# Patient Record
Sex: FEMALE | Employment: FULL TIME | URBAN - METROPOLITAN AREA
[De-identification: names, ages, dates, MRNs, and addresses within clinical notes are randomized per-mention and may not be internally consistent; named-entity substitution may affect disease eponyms.]

---

## 2022-10-20 ENCOUNTER — OFFICE VISIT (OUTPATIENT)
Dept: INTERNAL MEDICINE CLINIC | Age: 72
End: 2022-10-20
Payer: MEDICARE

## 2022-10-20 VITALS
DIASTOLIC BLOOD PRESSURE: 75 MMHG | BODY MASS INDEX: 37.39 KG/M2 | RESPIRATION RATE: 16 BRPM | OXYGEN SATURATION: 99 % | SYSTOLIC BLOOD PRESSURE: 121 MMHG | WEIGHT: 203.2 LBS | TEMPERATURE: 97.3 F | HEART RATE: 57 BPM | HEIGHT: 62 IN

## 2022-10-20 DIAGNOSIS — Z23 NEEDS FLU SHOT: ICD-10-CM

## 2022-10-20 DIAGNOSIS — R73.9 BORDERLINE HYPERGLYCEMIA: ICD-10-CM

## 2022-10-20 DIAGNOSIS — Z78.0 POSTMENOPAUSAL: ICD-10-CM

## 2022-10-20 DIAGNOSIS — Z11.59 ENCOUNTER FOR HEPATITIS C SCREENING TEST FOR LOW RISK PATIENT: ICD-10-CM

## 2022-10-20 DIAGNOSIS — R60.0 BILATERAL LOWER EXTREMITY EDEMA: ICD-10-CM

## 2022-10-20 DIAGNOSIS — Z12.31 ENCOUNTER FOR SCREENING MAMMOGRAM FOR MALIGNANT NEOPLASM OF BREAST: ICD-10-CM

## 2022-10-20 DIAGNOSIS — I48.91 ATRIAL FIBRILLATION, UNSPECIFIED TYPE (HCC): Primary | ICD-10-CM

## 2022-10-20 DIAGNOSIS — Z12.11 SCREEN FOR COLON CANCER: ICD-10-CM

## 2022-10-20 DIAGNOSIS — I87.2 STASIS DERMATITIS OF BOTH LEGS: ICD-10-CM

## 2022-10-20 PROCEDURE — 99204 OFFICE O/P NEW MOD 45 MIN: CPT | Performed by: INTERNAL MEDICINE

## 2022-10-20 PROCEDURE — 90694 VACC AIIV4 NO PRSRV 0.5ML IM: CPT | Performed by: INTERNAL MEDICINE

## 2022-10-20 PROCEDURE — G0008 ADMIN INFLUENZA VIRUS VAC: HCPCS | Performed by: INTERNAL MEDICINE

## 2022-10-20 RX ORDER — SOTALOL HYDROCHLORIDE 80 MG/1
80 TABLET ORAL 2 TIMES DAILY
COMMUNITY
Start: 2022-08-12

## 2022-10-20 RX ORDER — RIVAROXABAN 20 MG/1
TABLET, FILM COATED ORAL
COMMUNITY
Start: 2022-08-25

## 2022-10-20 RX ORDER — ATORVASTATIN CALCIUM 40 MG/1
40 TABLET, FILM COATED ORAL DAILY
COMMUNITY

## 2022-10-20 RX ORDER — BUMETANIDE 1 MG/1
1 TABLET ORAL DAILY
COMMUNITY
Start: 2022-08-24

## 2022-10-20 NOTE — PROGRESS NOTES
PROGRESS NOTE  Name: Samy Reed   : 1950       ASSESSMENT/ PLAN:     Diagnoses and all orders for this visit:    Atrial fibrillation, unspecified type ([facility] [address]  -     LIPID PANEL; Future  -     METABOLIC PANEL, COMPREHENSIVE; Future  -     CBC WITH AUTOMATED DIFF; Future  -     CBC WITH AUTOMATED DIFF  -     METABOLIC PANEL, COMPREHENSIVE  -     LIPID PANEL    Needs flu shot  -     INFLUENZA, FLUAD, (AGE 72 Y+), IM, PF, 0.5 ML    Bilateral lower extremity edema  -     REFERRAL TO LYMPHEDEMA CLINIC    Stasis dermatitis of both legs    Encounter for screening mammogram for malignant neoplasm of breast  -     MERLY MAMMO BI SCREENING INCL CAD; Future    Postmenopausal  -     DEXA BONE DENSITY STUDY AXIAL; Future    Screen for colon cancer  -     COLOGUARD TEST (FECAL DNA COLORECTAL CANCER SCREENING)    Borderline hyperglycemia  -     HEMOGLOBIN A1C WITH EAG; Future  -     HEMOGLOBIN A1C WITH EAG    Encounter for hepatitis C screening test for low risk patient  -     HEPATITIS C AB; Future  -     HEPATITIS C AB    Follow-up and Dispositions    Return in about 1 year (around 10/20/2023) for leg edema, etc.       I have reviewed the patient's medications and risks/side effects/benefits were discussed. Diagnosis(-es) explained to patient and questions answered. Literature provided where appropriate. SUBJECTIVE:   Ms. Samy Reed is a 71 y.o. female who is here for follow up of routine medical issues. Chief Complaint   Patient presents with    Establish Care       She has never had a PCP since childhood. She has atrial fibrillation, and is on sotalol and xarelto. She has a history of ablation in the past, Dr. Sebastián Mercado. She developed hives after spray for pesticide. At this time, she is otherwise doing well and has brought no other complaints to my attention today. For a list of the medical issues addressed today, see the assessment and plan below.     PMH:   Past Medical History:   Diagnosis Date    Arrhythmia     ATRIAL FIB    Hypertension        Past Surgical History:   Procedure Laterality Date    HX APPENDECTOMY      HX CATARACT REMOVAL Bilateral     HX GASTRIC BYPASS  01/01/1994    HX LUMBAR FUSION  2016    L4-5    HX OTHER SURGICAL Right 2018    Fell and hit coffee table; bleeding--vascular proceedure R thigh. HX TONSILLECTOMY         All: She has No Known Allergies. Current Outpatient Medications   Medication Sig    bumetanide (BUMEX) 1 mg tablet Take 1 mg by mouth daily. sotaloL (BETAPACE) 80 mg tablet Take 80 mg by mouth two (2) times a day. Xarelto 20 mg tab tablet TAKE 1 TABLET BY MOUTH ONCE DAILY WITH EVENING MEAL. atorvastatin (LIPITOR) 40 mg tablet Take 40 mg by mouth daily. lisinopril-hydrochlorothiazide (PRINZIDE, ZESTORETIC) 10-12.5 mg per tablet Take  by mouth daily. ferrous sulfate 325 mg (65 mg iron) tablet Take  by mouth Daily (before breakfast). thiamine HCL (B-1) 100 mg tablet Take  by mouth daily. No current facility-administered medications for this visit. FH: Her family history includes Heart Failure in her mother. 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 She reports that she has never smoked. She has never used smokeless tobacco. She reports that she does not drink alcohol and does not use drugs. ROS: See above; Complete ROS otherwise negative. OBJECTIVE:   Vitals: Visit Vitals  /75 (BP 1 Location: Left upper arm, BP Patient Position: Sitting, BP Cuff Size: Adult)   Pulse (!) 57   Temp 97.3 °F (36.3 °C) (Temporal)   Resp 16   Ht 5' 2\" (1.575 m)   Wt 203 lb 3.2 oz (92.2 kg)   SpO2 99%   BMI 37.17 kg/m²      Gen: Pleasant 71 y.o.  female in NAD. HEENT: PERRLA. EOMI. OP moist and pink. Neck: Supple. No LAD. HEART: RRR, No M/G/R.    LUNGS: CTAB No W/R. ABDOMEN: S, NT, ND, BS+. EXTREMITIES: Warm. 2-3+ edema with stasis dermatitis and weeping. MUSCULOSKELETAL: Normal ROM, muscle strength 5/5 all groups.   NEURO: Alert and oriented x 3. Cranial nerves grossly intact. No focal sensory or motor deficits noted. SKIN: Warm. Dry. Lab Results   Component Value Date/Time    Sodium 141 11/17/2011 04:00 AM    Potassium 3.2 (L) 11/17/2011 04:00 AM    Chloride 108 11/17/2011 04:00 AM    CO2 26 11/17/2011 04:00 AM    Anion gap 7 11/17/2011 04:00 AM    Glucose 100 11/17/2011 04:00 AM    BUN 5 (L) 11/17/2011 04:00 AM    Creatinine 0.4 (L) 11/17/2011 04:00 AM    BUN/Creatinine ratio 13 11/17/2011 04:00 AM    GFR est AA >60 11/17/2011 04:00 AM    GFR est non-AA >60 11/17/2011 04:00 AM    Calcium 8.3 (L) 11/17/2011 04:00 AM    Bilirubin, total 0.8 11/09/2011 02:16 PM    ALT (SGPT) 22 11/09/2011 02:16 PM    Alk.  phosphatase 73 11/09/2011 02:16 PM    Protein, total 6.5 11/09/2011 02:16 PM    Albumin 3.7 11/09/2011 02:16 PM    Globulin 2.8 11/09/2011 02:16 PM    A-G Ratio 1.3 11/09/2011 02:16 PM     Lab Results   Component Value Date/Time    WBC 8.6 11/09/2011 02:16 PM    HGB 10.3 (L) 11/17/2011 04:00 AM    HCT 30.9 (L) 11/17/2011 04:00 AM    PLATELET 893 40/47/4904 02:16 PM    MCV 91.0 11/09/2011 02:16 PM

## 2022-10-20 NOTE — PROGRESS NOTES
1. \"Have you been to the ER, urgent care clinic since your last visit? Hospitalized since your last visit? \" No    2. \"Have you seen or consulted any other health care providers outside of the [address] [place] [address] since your last visit? \" No     3. For patients aged 39-70: Has the patient had a colonoscopy / FIT/ Cologuard? No      If the patient is female:    4. For patients aged 41-77: Has the patient had a mammogram within the past 2 years? No      5. For patients aged 21-65: Has the patient had a pap smear?  No

## 2022-10-21 LAB
ALBUMIN SERPL-MCNC: 3.9 G/DL (ref 3.5–5)
ALBUMIN/GLOB SERPL: 1.3 {RATIO} (ref 1.1–2.2)
ALP SERPL-CCNC: 129 U/L (ref 45–117)
ALT SERPL-CCNC: 20 U/L (ref 12–78)
ANION GAP SERPL CALC-SCNC: 6 MMOL/L (ref 5–15)
AST SERPL-CCNC: 16 U/L (ref 15–37)
BASOPHILS # BLD: 0.1 K/UL (ref 0–0.1)
BASOPHILS NFR BLD: 1 % (ref 0–1)
BILIRUB SERPL-MCNC: 0.8 MG/DL (ref 0.2–1)
BUN SERPL-MCNC: 33 MG/DL (ref 6–20)
BUN/CREAT SERPL: 32 (ref 12–20)
CALCIUM SERPL-MCNC: 9.2 MG/DL (ref 8.5–10.1)
CHLORIDE SERPL-SCNC: 104 MMOL/L (ref 97–108)
CHOLEST SERPL-MCNC: 154 MG/DL
CO2 SERPL-SCNC: 29 MMOL/L (ref 21–32)
CREAT SERPL-MCNC: 1.03 MG/DL (ref 0.55–1.02)
DIFFERENTIAL METHOD BLD: ABNORMAL
EOSINOPHIL # BLD: 1.3 K/UL (ref 0–0.4)
EOSINOPHIL NFR BLD: 13 % (ref 0–7)
ERYTHROCYTE [DISTWIDTH] IN BLOOD BY AUTOMATED COUNT: 14.1 % (ref 11.5–14.5)
EST. AVERAGE GLUCOSE BLD GHB EST-MCNC: 105 MG/DL
GLOBULIN SER CALC-MCNC: 3.1 G/DL (ref 2–4)
GLUCOSE SERPL-MCNC: 99 MG/DL (ref 65–100)
HBA1C MFR BLD: 5.3 % (ref 4–5.6)
HCT VFR BLD AUTO: 39.2 % (ref 35–47)
HCV AB SERPL QL IA: NONREACTIVE
HDLC SERPL-MCNC: 58 MG/DL
HDLC SERPL: 2.7 {RATIO} (ref 0–5)
HGB BLD-MCNC: 12.4 G/DL (ref 11.5–16)
IMM GRANULOCYTES # BLD AUTO: 0 K/UL (ref 0–0.04)
IMM GRANULOCYTES NFR BLD AUTO: 0 % (ref 0–0.5)
LDLC SERPL CALC-MCNC: 78 MG/DL (ref 0–100)
LYMPHOCYTES # BLD: 1.5 K/UL (ref 0.8–3.5)
LYMPHOCYTES NFR BLD: 15 % (ref 12–49)
MCH RBC QN AUTO: 31.8 PG (ref 26–34)
MCHC RBC AUTO-ENTMCNC: 31.6 G/DL (ref 30–36.5)
MCV RBC AUTO: 100.5 FL (ref 80–99)
MONOCYTES # BLD: 0.7 K/UL (ref 0–1)
MONOCYTES NFR BLD: 7 % (ref 5–13)
NEUTS SEG # BLD: 6.1 K/UL (ref 1.8–8)
NEUTS SEG NFR BLD: 64 % (ref 32–75)
NRBC # BLD: 0 K/UL (ref 0–0.01)
NRBC BLD-RTO: 0 PER 100 WBC
PLATELET # BLD AUTO: 259 K/UL (ref 150–400)
PMV BLD AUTO: 11.9 FL (ref 8.9–12.9)
POTASSIUM SERPL-SCNC: 4.4 MMOL/L (ref 3.5–5.1)
PROT SERPL-MCNC: 7 G/DL (ref 6.4–8.2)
RBC # BLD AUTO: 3.9 M/UL (ref 3.8–5.2)
RBC MORPH BLD: ABNORMAL
SODIUM SERPL-SCNC: 139 MMOL/L (ref 136–145)
TRIGL SERPL-MCNC: 90 MG/DL (ref ?–150)
VLDLC SERPL CALC-MCNC: 18 MG/DL
WBC # BLD AUTO: 9.7 K/UL (ref 3.6–11)

## 2022-12-21 ENCOUNTER — HOSPITAL ENCOUNTER (OUTPATIENT)
Dept: PHYSICAL THERAPY | Age: 72
Discharge: HOME | End: 2022-12-21
Payer: MEDICARE

## 2022-12-21 VITALS — BODY MASS INDEX: 38.05 KG/M2 | WEIGHT: 206.8 LBS | HEIGHT: 62 IN

## 2022-12-21 PROCEDURE — 97110 THERAPEUTIC EXERCISES: CPT

## 2022-12-21 PROCEDURE — 97140 MANUAL THERAPY 1/> REGIONS: CPT

## 2022-12-21 PROCEDURE — 97161 PT EVAL LOW COMPLEX 20 MIN: CPT

## 2022-12-21 NOTE — THERAPY EVALUATION
PT/OT LYMPHEDEMA INITIAL EVALUATION NOTE - [facility] 2-15    Patient Name: Davie Smith  Date:2022  : 1950  [x]  Patient  Verified  Payor: Maribeth Harvey / Plan: [facility] MEDICARE COMPLETE / Product Type: Managed Care Medicare /    In time: 8:00 am  Out time: 9:10 am  Total Treatment Time (min): 70  Total Timed Codes (min): 45  1:1 Treatment Time ( only): 45   Visit #: 1     Treatment Area: Lymphedema, not elsewhere classified [I89.0] B LE     SUBJECTIVE  Pain Level (0-10 scale): Patient reports discomfort in the knees from arthritis. Any medication changes, allergies to medications, adverse drug reactions, diagnosis change, or new procedure performed?: [] No    [x] Yes (see summary sheet for update)  Subjective:    Patient reports that swelling in the legs began several years ago. She attempted to wear knee high stockings but they never fit well. Function: Patient is independent with self care and household chores; uses a narrow based quad cane in the community and no assistive device in the house. She has difficulty with stairs. Mechanism of Injury: Patient reports that swelling in the legs began 4 years ago soon after falling into the coffee table and suffering an injury to the R thigh. She required surgery which left a large scar. The swelling is worse in the R leg and seems to be progressing over time. No history of infection. Previous Treatment/Compliance: None  PMHx/Surgical Hx:   Past Surgical History:   Procedure Laterality Date    HX APPENDECTOMY      HX CATARACT REMOVAL Bilateral     HX GASTRIC BYPASS  1994    HX LUMBAR FUSION  2016    L4-5    HX OTHER SURGICAL Right 2018    Fell and hit coffee table; bleeding--vascular proceedure R thigh. HX TONSILLECTOMY       Past Medical History:   Diagnosis Date    Arrhythmia     ATRIAL FIB    Hypertension      Work Hx: Retired nurse. Living Situation: Lives alone in an apartment with 5 steps and rail to enter.    Pt Goals: reduce swelling in the legs  Barriers: no insurance coverage of compression products  Motivation: good/fair  Cognition: A & O x 3      Sleeping Arrangement:  in bed at night  Compression/Lymphedema Equipment: None    LLIS Score: 21 with 31% impairment    OBJECTIVE/EXAMINATION    Skin and Tissue Assessment:  Dermal Status: Intact R LE; L LE intact except for superficial wound and two to three fluid filled blisters on the L lower leg anteriorly. Texture/Consistency: Brawny and Fibrotic lower legs; boggy upper legs and feet/ankles    Pigmentation/Color Change: Hyperpigmented lower legs    Anomalies: Lymphorrhea (lymph drainage)/blisters L lower leg anteriorly and papillomas on the legs bilaterally. Circulatory:  No history of DVT    Nails: Fungus and in need of trimming. Stemmers Sign: Positive    Height:  Height: 5' 2\" (157.5 cm)  Weight:  Weight: 93.8 kg (206 lb 12.8 oz)   BMI:  BMI (calculated): 37.8  (36 or greater: adversely affecting lymphedema)  Volumetric Measurements:   Right:  15,461. 32 mL Left:  13,270.66 mL   % Difference: 16.51% R LE > L LE  Dominance: Right hand dominant     15 min Therapeutic Exercise:  [] Rose Robledo Exercises [x] Remedial Lymphedema Exercise Program - demonstrated the LE active ROM routine and deep abdominal breathing [x] Discussed participating in a daily walking program with cane up to 25 minutes per day as tolerated    [x] Encouraged patient to avoid sitting for prolonged periods at one time. Rationale: Activate muscle pump to improve lymphatic fluid movement and decrease swelling to improve the patients ability to perform ADL and IADL skills and prevent worsening of swelling over time. 35 min Manual Therapy: Patient instructed in skin care principles and anatomy of the lymphatic system. Discussed performing daily skin care with a low pH lotion using manual lymphatic drainage principles. Provided patient with a written list of recommended soaps and lotions. Performed the following skin care today: washed the superficial wound on the L lower leg and applied A and D ointment and covered with 2 X 2 and wrapped with kerlix. Discussed keeping wounds covered, the increased risk of infection with lymphedema, and signs and symptoms of infection. Discussed standard lymphedema precautions to include avoiding blood pressure readings, injections and IVs or other procedures/acts that could lead to broken skin on affected area, and avoiding excessive heat, resistive activity or altitude without compression garment. Discussed the role and benefit of multi-layer compression bandaging for management of swelling but patient is reluctant to pursue bandaging at this time due to the time commitment. Provided patient with samples of compression products, including flat-knit, velcro, and foam, products and discussed that patient's insurance may not cover the cost of products. Patient may be interested in pursing compression products in a future visit. Discussed the role and benefit of the vaso-pneumatic device as another tool to have at home for optimum management of swelling but patient is not interested in pursuing a pump at this time. Rationale: increase tissue extensibility, decrease edema , and improve skin integrity  to improve the patients ability to reduce the risk of infection and progression of swelling over time. With   [x] TE   [] TA   [x] MT   [] SC   [] other: Patient Education: [x] Review HEP    [x] MLD Patient Education Continued education in self MLD technique with bathing and skin care. [] Progressed/Changed HEP based on:   [x] positioning   [] Kinesiotape   [x] Skin care   [] wound care   [] other:   [x]  Role and benefit of MLB, compression product options  Patient / caregiver re-demonstrated bandaging. [] Yes  [] No  Compression bandaging/garment precautions reviewed: [x] Yes  [] No          Pain Level (0-10 scale) post treatment: No pain reported. ASSESSMENT/Changes in Function:     [x]  See Plan of [address] Pacheco, PT, CLT 12/21/2022

## 2022-12-21 NOTE — PROGRESS NOTES
Outpatient Lymphedema Clinic  a part of [facility]  [address] [place], [address] [facility], [place], [address] [place] [address]  Phone: [phone]  Fax: [phone]    Plan of Care/Statement of Necessity for Physical Therapy/Occupational Therapy Services  2-15    Patient name: Rashid Watts  : 1950  Provider#: [id number]  Referral source: Danita Dudley MD      Medical/Treatment Diagnosis: Lymphedema, not elsewhere classified [I89.0]     Prior Hospitalization: see medical history     Comorbidities: HTN, MI, atrial fib with history of ablation, B LE lymphedema  Prior Level of Function: Patient is independent with self care and household chores; uses a narrow based quad cane in the community and no assistive device in the house. She has difficulty with stairs. Medications: Verified on Patient Summary List  Start of Care: 2022      Onset Date:    The Plan of Care and following information is based on the information from the initial evaluation. Assessment/ medina information: Rashid Watts is a 71 y.o. female who presents with secondary B LE lymphedema complicated by skin integrity issues, history of afib/MI, and obesity. Full LE volumes taken today reveal a percentage difference of 16.52% R LE > L LE. Patient has attempted to wear compression stockings in the past but they never fit well or were comfortable. Patient is at increased risk of progression of swelling, infection, and wounds over time. Patient lives alone and prefers to remain independent. She does have a history of falls and uses a quad cane for support. Patient will benefit from complete decongestive therapy (CDT) including manual lymphatic drainage (MLD) technique, short-stretch textile bandages/compression system to decongest limb, and kinesiotaping as appropriate.   Patient will receive instruction in proper skin care to recognize signs/symptoms of and prevent infection, therapeutic exercise, and self-MLD for independent home program and restorative lymphatic performance. This care is medically necessary due to the infection risk with lymphedema and to improve functional activities. CDT is necessary to resolve swelling to allow patient to return to wearing normal clothes/footwear and prevent worsening of symptoms, such as venous stasis ulcerations, infections, or hospitalizations. Patient will be independent with home program strategies to allow improved ADL ability and mobility and to allow patient to return to greatest functional independence. Evaluation Complexity History MEDIUM  Complexity : 1-2 comorbidities / personal factors will impact the outcome/ POC ; Examination LOW Complexity : 1-2 Standardized tests and measures addressing body structure, function, activity limitation and / or participation in recreation  ;Presentation MEDIUM Complexity : Evolving with changing characteristics  ; Clinical Decision Making Other outcome measures LLIS  MEDIUM  Overall Complexity Rating: LOW     Problem List: edema and skin integrity issues. Treatment Plan may include any combination of the following: skin care, therapeutic exercise, manual lymphatic drainage, multi-layer compression bandaging, measuring and fitting of compression products, and patient education as appropriate. Patient / Family readiness to learn indicated by: asking questions, trying to perform skills, and interest  Persons(s) to be included in education: patient   Barriers/Limitations: no insurance coverage of compression products  Patient Goal (s): reduce swelling in the legs  Rehabilitation Potential: good/fair    Short Term Goals: To be met by 2/1/2023  1. Patient/Caregiver will demonstrate knowledge of signs/symptoms of infections/cellulitis and be independent in skin care to prevent cellulitis.   2.  Patient/Caregiver will demonstrate independence in lymphedema home program of therapeutic exercises to improve circulation and decongest limb to improve ADLs.  3.  Patient/Caregiver will tolerate multi-layer bandages (MLB) and show measureable decrease in limb volume and/or participate in the selection process to allow ordering of home compression system (daytime, nighttime garments and pump as needed). Long Term Goals: To be met by 3/17/2023  1. Patient/Caregiver will show improvement in the Lymphedema Life Impact Scale by decreasing the score to 18 and thus allow improvement in patient's quality of life. 2.  Patient/Caregiver will be independent with don/doff of compression system and use in order to prevent reaccumulation of fluid at discharge. 3. Patient/Caregiver will be independent in home lymphedema management and demonstrate stable or reduced limb volumes to indicate appropriateness for restorative phase of care. Frequency / Duration: Patient to be seen 3 to 7 visits over the next 12 weeks. Patient/ Caregiver education and instruction: exercises, compression product options, skin/wound care. [x]  Plan of care has been reviewed with PTA    Certification Period: 12/21/2022-3/17/2023    Jaqueline Castro PT, CLT 12/21/2022   _______________________________________________________________________    I certify that the above Therapy Services are being furnished while the patient is under my care. I agree with the treatment plan and certify that this therapy is necessary.     [facility] Signature:____________________  Date:____________Time: _________         Cyril Tinsley MD

## 2022-12-29 ENCOUNTER — TELEPHONE (OUTPATIENT)
Dept: INTERNAL MEDICINE CLINIC | Age: 72
End: 2022-12-29

## 2022-12-29 DIAGNOSIS — R19.5 POSITIVE COLORECTAL CANCER SCREENING USING COLOGUARD TEST: Primary | ICD-10-CM

## 2022-12-29 NOTE — TELEPHONE ENCOUNTER
Referral printed off and mailed to the patient.    [facility] GI referral.   Signed By: Chaim Solis LPN     December 29, 2022

## 2022-12-29 NOTE — TELEPHONE ENCOUNTER
Two pt identifiers confirmed. I spoke to the patient's daughter Chloe Ramirez, I let her know about the positive Cologuard results. A referral was ordered to see  . Pt's daughter verbalized understanding of information discussed w/ no further questions at this time.   Signed By: Cecilia Hare LPN     December 29, 2022

## 2023-01-03 ENCOUNTER — HOSPITAL ENCOUNTER (OUTPATIENT)
Dept: PHYSICAL THERAPY | Age: 73
Discharge: HOME | End: 2023-01-03
Payer: MEDICARE

## 2023-01-03 PROCEDURE — 97140 MANUAL THERAPY 1/> REGIONS: CPT

## 2023-01-03 NOTE — PROGRESS NOTES
LYMPHEDEMA PT DAILY TREATMENT NOTE - [facility] 2-15    Patient Name: Mahnaz Moreau  Date:1/3/2023  : 1950  [x]  Patient  Verified  Payor: Jaiden Ayala / Plan: [facility] MEDICARE COMPLETE / Product Type: Managed Care Medicare /    In time: 9:10 am  Out time: 10:30 am  Total Treatment Time (min): 80  Total Timed Codes (min): 80  1:1 Treatment Time ( [address] only): 80   Visit #:  2    Treatment Area: Lymphedema, not elsewhere classified [I89.0]    SUBJECTIVE  Pain Level (0-10 scale): No pain reported  Any medication changes, allergies to medications, adverse drug reactions, diagnosis change, or new procedure performed?: [] No    [x] Yes (see summary sheet for update) Patient reports having cortisone shots in her knees at [facility] yesterday. Subjective functional status/changes:   [x] No changes reported  Patient is making an effort to elevate her legs at home more often. She has ordered a recliner which should be delivered soon. OBJECTIVE    5 min Therapeutic Exercise:  [] Crissie Romberg Exercises [x] Remedial Lymphedema Exercise Program - ankle pumps, ankle circles, heel slides, toe crunches [] Axillary Web Exercise Program      [] Stick Routine    Rationale: Activate muscle pump to improve lymphatic fluid movement and decrease swelling to improve the patients ability to perform ADL and IADL skills and prevent worsening of swelling over time. 75 min Manual Therapy    Kinesiotaping: deferred     Manual Lymphatic Drainage (MLD):  Area to decongest: B LE's   Sequence used and effectiveness: Secondary sequence for lower extremities without trunk involvement. Modifications were made to manual lymph drainage sequence to exclude cervical techniques secondary to patient's age. Patient educated in self MLD packet during MLD portion of the visit. Skin/wound care/debridement: Reviewed skin care principles:   Patient's extremity bathed with soap and water.    Performed skin care with low pH lotion following manual lymph principles. Prevention of cellulitis  Wound care  Patient with two small superficial wounds (approximately 1 cm in diameter) on the L lower leg anteriorly. She is performing skin care at home and keeping covered with bandage. Applied multi-layer compression bandaging to: Patient prefers to defer bandaging at this time. Upper/Lower Extremity Compression: Measured for the following compression products: B LE       Style: Velcro    Brand: 1. Two Solaris Ready Wrap calf units in size XX large regular length and  2. One pair of Circaid compressive under socks 15-25 mmHg size large    Type: Non-Custom: Size: XX large in regular length    Vendor: [employer]    Education: Daily wear schedule. Daily laundering. Garment lifespan. Return and reordering process (by bringing prior garments into the clinic). Educated patient to monitor for redness or pressure points on the skin. If new pain occurs they should contact their therapist.  Eunice Miranda and liberty. Rationale: increase tissue extensibility and decrease edema  to improve the patients ability to reduce the risk of infection, wounds, and progression of swelling over time. 0   Vasopneumatic Device:   Discussed the role and benefit of a home vaso-pneumatic device for optimum management of swelling but patient is not interested in obtaining a pump at this time. May be interested in exploring this option in the future. Rationale: To improve lymphatic fluid movement and decrease swelling to improve the patients ability to perform ADL and IADL skills and prevent worsening of swelling over time. With   [] TE   [] TA   [x] MT   [] SC   [] other: Patient Education: [x] Review HEP    [] MLD Patient Education Reviewed with patient, as well as demonstration and instruction during MLD portion of the session. Continued education in self MLD technique with bathing and skin care.    Provided patient with the written instructions to follow. [x] Progressed/Changed HEP based on:   [x] positioning   [] Kinesiotape   [x] Skin care   [x] wound care   [x] other: compression product options  []   Patient / caregiver re-demonstrated bandaging. [] Yes  [] No  Compression bandaging/garment precautions reviewed: [x] Yes  [] No     Other Objective/Functional Measures:    Height:   deferred  Weight:   deferred   BMI:   deferred  (36 or greater: adversely affecting lymphedema)    Pain Level (0-10 scale) post treatment: No pain reported. ASSESSMENT/Changes in Function:   Patient is returning today for the first visit since evaluation. She presents with secondary B LE lymphedema with skin integrity issues. She has been educated in skin care and is keeping the superficial wounds covered. She is making an effort to perform exercises and elevate her legs as instructed on evaluation. She was educated in the self MLD packet and was measured for compression products today. She will return to the clinic in two to three weeks for fitting of products. Patient is making progress toward all short term goals and will continue to benefit from skilled PT services to  address swelling, analyze and address soft tissue restrictions, analyze compression product fit and use, instruct in home lymphedema management program, and modify and progress therapeutic interventions to attain remaining goals. []  See Plan of Care  []  See progress note/recertification  []  See Discharge Summary         Progress towards goals / Updated goals:  Short Term Goals: To be met by 2/1/2023  1. Patient/Caregiver will demonstrate knowledge of signs/symptoms of infections/cellulitis and be independent in skin care to prevent cellulitis. Goal in progress. 2.  Patient/Caregiver will demonstrate independence in lymphedema home program of therapeutic exercises to improve circulation and decongest limb to improve ADLs. Goal in progress.    3.  Patient/Caregiver will tolerate multi-layer bandages (MLB) and show measureable decrease in limb volume and/or participate in the selection process to allow ordering of home compression system (daytime, nighttime garments and pump as needed). Compression products ordered today. Will continue to assess for additional needs. Goal in progress. Long Term Goals: To be met by 3/17/2023  1. Patient/Caregiver will show improvement in the Lymphedema Life Impact Scale by decreasing the score to 18 and thus allow improvement in patient's quality of life. 2.  Patient/Caregiver will be independent with don/doff of compression system and use in order to prevent reaccumulation of fluid at discharge. 3. Patient/Caregiver will be independent in home lymphedema management and demonstrate stable or reduced limb volumes to indicate appropriateness for restorative phase of care.        PLAN  []  Upgrade activities as tolerated     [x]  Continue plan of care  []  Update interventions per flow sheet       []  Discharge due to:_  []  Other:_      Kriss Edgar, PT, CLT 1/3/2023

## 2023-01-20 ENCOUNTER — APPOINTMENT (OUTPATIENT)
Dept: PHYSICAL THERAPY | Age: 73
End: 2023-01-20
Payer: MEDICARE

## 2023-01-25 ENCOUNTER — APPOINTMENT (OUTPATIENT)
Dept: PHYSICAL THERAPY | Age: 73
End: 2023-01-25
Payer: MEDICARE

## 2023-01-27 ENCOUNTER — HOSPITAL ENCOUNTER (OUTPATIENT)
Dept: PHYSICAL THERAPY | Age: 73
End: 2023-01-27
Payer: MEDICARE

## 2023-01-27 NOTE — PROGRESS NOTES
[facility]  Lymphedema Clinic  [address] [facility] [address], [address], [address] [place] [address] Drive THERAPY      1/27/2023:  Rad Barrett was not seen on this date for physical therapy for the following reason(s):    Patient called to cancel the visit for the following reasons: Illness; Patient called to cancel the visit she had scheduled on 1/25/22 due to illness. Her daughter contacted the clinic yesterday and reported that patient was not ill but anxious regarding attending visits in our clinic. She requested patient be seen as soon as possible since she received the compression products that were ordered. Patient was scheduled for an appointment today which she again cancelled due to illness. Contacted the patient this morning and discussed the 30 day exchange policy of the compression products that were received and that it would be beneficial for her to attend an appointment for the fit of the products to be assessed and for education in use of the products. Patient prefers to work with the products herself and call the clinic if she wishes to return for treatment.      Gina Jensen, PT, CLT

## 2023-02-17 ENCOUNTER — HOSPITAL ENCOUNTER (OUTPATIENT)
Dept: PHYSICAL THERAPY | Age: 73
Discharge: HOME | End: 2023-02-17
Payer: MEDICARE

## 2023-02-17 VITALS — HEIGHT: 62 IN | BODY MASS INDEX: 37.73 KG/M2 | WEIGHT: 205 LBS

## 2023-02-17 PROCEDURE — 97140 MANUAL THERAPY 1/> REGIONS: CPT

## 2023-02-17 NOTE — PROGRESS NOTES
LYMPHEDEMA PT DAILY TREATMENT NOTE - [facility] 2-15    Patient Name: Umang Mittal  Date:2023  : 1950  [x]  Patient  Verified  Payor: Rafy Bowles / [facility] / Product Type: Managed Care Medicare /    In time: 12:30 pm  Out time: 1:45 pm  Total Treatment Time (min): 75  Total Timed Codes (min): 75  1:1 Treatment Time ([facility] only): 75  Visit #:  3    Treatment Area: Lymphedema, not elsewhere classified [I89.0] B LE     SUBJECTIVE  Pain Level (0-10 scale): Patient reports no pain at rest and B knee pain 2 to 3/10 with activity. Any medication changes, allergies to medications, adverse drug reactions, diagnosis change, or new procedure performed?: [] No    [x] Yes (see summary sheet for update) Patient reports being seen in the Emergency Department at [facility] for an episode of Atrial Fib soon after the last visit in our clinic. Patient reports having a Cardioversion and then a follow up with Dr. Ana George, her Cardiologist. She is scheduled to have CT of the heart in March. Dr. Ana George was contacted by our clinic and approval for patient to wear compression products was obtained 23. Patient reports taking an additional medication:Bumex. Subjective functional status/changes:   [x] No changes reported  Patient arrives to the visit accompanied by her daughter. Patient reports receiving the compression products ordered last visit and making an effort to wear the products most days. Patient's daughter reports that she has never seen the velcro products on her mother's legs. OBJECTIVE    0 min Therapeutic Exercise:  [] Hurtis Cordial Exercises [x] Remedial Lymphedema Exercise Program - provided patient with the written instructions to follow; will continue education in a future visit. [] Axillary Web Exercise Program      [] Stick Routine    Rationale:  Activate muscle pump to improve lymphatic fluid movement and decrease swelling to improve the patients ability to perform ADL and IADL skills and prevent worsening of swelling over time. 75 min Manual Therapy    Kinesiotaping: deferred     Manual Lymphatic Drainage (MLD):  Area to decongest: B LE's   Sequence used and effectiveness: Deferred. Patient was educated in self MLD packet during a previous visit. Skin/wound care/debridement: Reviewed skin care principles:   Patient's extremity bathed with soap and water. Performed skin care with low pH lotion following manual lymph principles. Prevention of cellulitis  Wound care  Patient with one small wound on the L anterior lower leg. She is keeping the wound covered and is aware of signs and symptoms of infection. Applied multi-layer compression bandaging to: Discussed the role and benefit of MLB with patient and daughter again today and that it remains an option in the future. Patient is not interested in participating in [facility] at this time. Upper/Lower Extremity Compression: Patient brought compression products to the clinic for fitting today. Patient reports making an effort to wear the velcro products/liner socks most days although the products do appear to have been worn. The initial fit of the garments is good and comfortable to the patient. Patient and daughter were educated in product use and patient encouraged to slowly integrate products into her daily routine: wear velcro product and liner socks for a few hours each day and then daily as tolerated or on the R LE and then the L LE, alternating days, until comfortably worn together. Patient able to demonstrate the ability to don/doff products with verbal cues today. B LE       Style: Velcro    Brand: 1. Two Solaris Ready Wrap calf units in size XX large regular length and  2. One pair of Circaid compressive under socks 25-35 mmHg size large - patient provided with information to purchase additional liner socks when ready.     Type: Non-Custom: Size: XX large in regular length    Vendor: [employer]    Education: Daily wear schedule. Daily laundering. Garment lifespan. Return and reordering process (by bringing prior garments into the clinic). Educated patient to monitor for redness or pressure points on the skin. If new pain occurs they should contact their therapist.  Tapan Harding and liberty. Remove with any shortness of breath or heart palpitations   Rationale: increase tissue extensibility and decrease edema  to improve the patients ability to reduce the risk of infection, wounds, and progression of swelling over time. 0   Vasopneumatic Device:   Patient is not interested in obtaining a pump at this time and would need to be cleared by Cardiology prior to obtaining a pump in the future. Rationale: To improve lymphatic fluid movement and decrease swelling to improve the patients ability to perform ADL and IADL skills and prevent worsening of swelling over time. With   [] TE   [] TA   [x] MT   [] SC   [] other: Patient Education: [x] Review HEP    [x] MLD Patient Education with skin care  [x] Progressed/Changed HEP based on:   [x] positioning   [] Kinesiotape   [x] Skin care   [x] wound care   [x] other: management of compression products, precautions, wear schedule  []   Patient / caregiver re-demonstrated bandaging. [] Yes  [] No  Compression bandaging/garment precautions reviewed: [x] Yes  [] No     Other Objective/Functional Measures:    Height:  Height: 5' 2\" (157.5 cm)  Weight:  Weight: 93 kg (205 lb)   BMI:  BMI (calculated): 37.5  (36 or greater: adversely affecting lymphedema)    Full LE volumes taken today:  R lower extremity 14,289.69 ml today compared to 15,461.32 ml on evaluation 12/21/23. L lower extremity 12,386.32 ml today compared to 13,270.66 ml on evaluation 12/21/23. Percentage difference 15.37% today compared to 16.51% R LE > L LE on evaluation    Pain Level (0-10 scale) post treatment: No pain reported.      ASSESSMENT/Changes in Function: Patient is returning to the clinic today for the third visit. She has not been seen since 1/3/23 due to three cancelled visits. She was treated in the Emergency Department for Atrial Fib and had a follow up with Cardiology since last seen in our clinic. Patient has been cleared to wear compression products which will be slowly integrated into her daily routine. She was fitted with the velcro products and liner socks today and educated in management of the products. Patient's daughter was present for education and will encourage patient to wear compression products, elevate legs throughout the day, and perform the remedial lymphedema exercises. Patient admits to being sedentary and sitting with legs in a dependent position. Patient will work on her home program to the best of her ability and return in mid March after cardiac testing is completed. She will continue to benefit from skilled PT services to  address swelling, analyze and address soft tissue restrictions, analyze compression product fit and use, instruct in home lymphedema management program, and modify and progress therapeutic interventions to attain remaining goals. []  See Plan of Care  [x]  See progress note/recertification  []  See Discharge Summary         Progress towards goals / Updated goals:  Short Term Goals: To be met by 2/1/2023. Will extend short term goal 1 and 2 to be met by 3/17/23. 1.  Patient/Caregiver will demonstrate knowledge of signs/symptoms of infections/cellulitis and be independent in skin care to prevent cellulitis. Goal in progress. 2.  Patient/Caregiver will demonstrate independence in lymphedema home program of therapeutic exercises to improve circulation and decongest limb to improve ADLs. Goal in progress.    3.  Patient/Caregiver will tolerate multi-layer bandages (MLB) and show measureable decrease in limb volume and/or participate in the selection process to allow ordering of home compression system (daytime, nighttime garments and pump as needed). Goal met 2/17/23. Long Term Goals: To be met by 3/17/2023  1. Patient/Caregiver will show improvement in the Lymphedema Life Impact Scale by decreasing the score to 18 and thus allow improvement in patient's quality of life. Patient scored a 3 on the LLIS today. Goal met 2/17/23. 2.  Patient/Caregiver will be independent with don/doff of compression system and use in order to prevent reaccumulation of fluid at discharge. In progress. 3. Patient/Caregiver will be independent in home lymphedema management and demonstrate stable or reduced limb volumes to indicate appropriateness for restorative phase of care. In progress.      PLAN  []  Upgrade activities as tolerated     [x]  Continue plan of care  []  Update interventions per flow sheet       []  Discharge due to:_  []  Other:_      Violette Mendiola PT, CLT 2/17/2023

## 2023-02-17 NOTE — PROGRESS NOTES
[facility]ymphedema [facility]  [address]  [address], [address] [facility], [address]  Phone: [phone]  Fax: [phone]        Progress Note    Name: Sheila Castañeda   : 1950   MD: Shilpa Ford MD       Treatment Diagnosis: Lymphedema, not elsewhere classified [I89.0], B LE   Start of Care: 22    Visits from Start of Care: 3  Missed Visits: 3    Summary of Care:  exercise, manual lymphatic drainage, measuring and fitting of compression products, patient and family education    ASSESSMENT/Changes in Function:   Patient is returning to the clinic today for the third visit. She has not been seen since 1/3/23 due to three cancelled visits. She was treated in the Emergency Department for Atrial Fib and had a follow up with Cardiology since last seen in our clinic. Patient has been cleared to wear compression products which will be slowly integrated into her daily routine. She was fitted with the velcro products and liner socks today and educated in management of the products. Patient's daughter was present for education and will encourage patient to wear compression products, elevate legs throughout the day, and perform the remedial lymphedema exercises. Patient admits to being sedentary and sitting with legs in a dependent position. Patient will work on her home program to the best of her ability and return in mid March after cardiac testing is completed. She will continue to benefit from skilled PT services to  address swelling, analyze and address soft tissue restrictions, analyze compression product fit and use, instruct in home lymphedema management program, and modify and progress therapeutic interventions to attain remaining goals. Progress towards goals / Updated goals:  Short Term Goals: To be met by 2023. Will extend short term goal 1 and 2 to be met by 3/17/23.     1.  Patient/Caregiver will demonstrate knowledge of signs/symptoms of infections/cellulitis and be independent in skin care to prevent cellulitis. Goal in progress. 2.  Patient/Caregiver will demonstrate independence in lymphedema home program of therapeutic exercises to improve circulation and decongest limb to improve ADLs. Goal in progress. 3.  Patient/Caregiver will tolerate multi-layer bandages (MLB) and show measureable decrease in limb volume and/or participate in the selection process to allow ordering of home compression system (daytime, nighttime garments and pump as needed). Goal met 2/17/23. Long Term Goals: To be met by 3/17/2023  1. Patient/Caregiver will show improvement in the Lymphedema Life Impact Scale by decreasing the score to 18 and thus allow improvement in patient's quality of life. Patient scored a 3 on the LLIS today. Goal met 2/17/23. 2.  Patient/Caregiver will be independent with don/doff of compression system and use in order to prevent reaccumulation of fluid at discharge. In progress. 3. Patient/Caregiver will be independent in home lymphedema management and demonstrate stable or reduced limb volumes to indicate appropriateness for restorative phase of care. In progress.      Other: continue per plan of care    Alexis Shipman, PT, CLT 2/17/2023

## 2023-03-16 ENCOUNTER — HOSPITAL ENCOUNTER (OUTPATIENT)
Age: 73
Setting detail: OUTPATIENT SURGERY
Discharge: HOME | End: 2023-03-16
Attending: INTERNAL MEDICINE | Admitting: INTERNAL MEDICINE
Payer: MEDICARE

## 2023-03-16 ENCOUNTER — ANESTHESIA (OUTPATIENT)
Dept: ENDOSCOPY | Age: 73
End: 2023-03-16
Payer: MEDICARE

## 2023-03-16 ENCOUNTER — ANESTHESIA EVENT (OUTPATIENT)
Dept: ENDOSCOPY | Age: 73
End: 2023-03-16
Payer: MEDICARE

## 2023-03-16 VITALS
WEIGHT: 202.9 LBS | TEMPERATURE: 97.6 F | HEART RATE: 61 BPM | RESPIRATION RATE: 16 BRPM | HEIGHT: 62 IN | DIASTOLIC BLOOD PRESSURE: 45 MMHG | SYSTOLIC BLOOD PRESSURE: 142 MMHG | BODY MASS INDEX: 37.34 KG/M2 | OXYGEN SATURATION: 97 %

## 2023-03-16 PROCEDURE — C1751 CATH, INF, PER/CENT/MIDLINE: HCPCS | Performed by: NURSE ANESTHETIST, CERTIFIED REGISTERED

## 2023-03-16 PROCEDURE — 74011000250 HC RX REV CODE- 250: Performed by: NURSE ANESTHETIST, CERTIFIED REGISTERED

## 2023-03-16 PROCEDURE — 77030013992 HC SNR POLYP ENDOSC BSC -B: Performed by: INTERNAL MEDICINE

## 2023-03-16 PROCEDURE — 76040000007: Performed by: INTERNAL MEDICINE

## 2023-03-16 PROCEDURE — 76060000032 HC ANESTHESIA 0.5 TO 1 HR: Performed by: INTERNAL MEDICINE

## 2023-03-16 PROCEDURE — 2709999900 HC NON-CHARGEABLE SUPPLY: Performed by: INTERNAL MEDICINE

## 2023-03-16 PROCEDURE — 88305 TISSUE EXAM BY PATHOLOGIST: CPT

## 2023-03-16 PROCEDURE — 74011250636 HC RX REV CODE- 250/636: Performed by: INTERNAL MEDICINE

## 2023-03-16 PROCEDURE — 74011250636 HC RX REV CODE- 250/636: Performed by: NURSE ANESTHETIST, CERTIFIED REGISTERED

## 2023-03-16 RX ORDER — FENTANYL CITRATE 50 UG/ML
25 INJECTION, SOLUTION INTRAMUSCULAR; INTRAVENOUS
Status: DISCONTINUED | OUTPATIENT
Start: 2023-03-16 | End: 2023-03-16 | Stop reason: HOSPADM

## 2023-03-16 RX ORDER — NALOXONE HYDROCHLORIDE 0.4 MG/ML
0.4 INJECTION, SOLUTION INTRAMUSCULAR; INTRAVENOUS; SUBCUTANEOUS
Status: DISCONTINUED | OUTPATIENT
Start: 2023-03-16 | End: 2023-03-16 | Stop reason: HOSPADM

## 2023-03-16 RX ORDER — DEXTROMETHORPHAN/PSEUDOEPHED 2.5-7.5/.8
1.2 DROPS ORAL
Status: DISCONTINUED | OUTPATIENT
Start: 2023-03-16 | End: 2023-03-16 | Stop reason: HOSPADM

## 2023-03-16 RX ORDER — SODIUM CHLORIDE 0.9 % (FLUSH) 0.9 %
5-40 SYRINGE (ML) INJECTION EVERY 8 HOURS
Status: DISCONTINUED | OUTPATIENT
Start: 2023-03-16 | End: 2023-03-16 | Stop reason: HOSPADM

## 2023-03-16 RX ORDER — ATROPINE SULFATE 0.1 MG/ML
0.5 INJECTION INTRAVENOUS
Status: DISCONTINUED | OUTPATIENT
Start: 2023-03-16 | End: 2023-03-16 | Stop reason: HOSPADM

## 2023-03-16 RX ORDER — SODIUM CHLORIDE 0.9 % (FLUSH) 0.9 %
5-40 SYRINGE (ML) INJECTION AS NEEDED
Status: DISCONTINUED | OUTPATIENT
Start: 2023-03-16 | End: 2023-03-16 | Stop reason: HOSPADM

## 2023-03-16 RX ORDER — FLUMAZENIL 0.1 MG/ML
0.2 INJECTION INTRAVENOUS
Status: DISCONTINUED | OUTPATIENT
Start: 2023-03-16 | End: 2023-03-16 | Stop reason: HOSPADM

## 2023-03-16 RX ORDER — SODIUM CHLORIDE 9 MG/ML
75 INJECTION, SOLUTION INTRAVENOUS CONTINUOUS
Status: DISCONTINUED | OUTPATIENT
Start: 2023-03-16 | End: 2023-03-16 | Stop reason: HOSPADM

## 2023-03-16 RX ORDER — EPINEPHRINE 0.1 MG/ML
1 INJECTION INTRACARDIAC; INTRAVENOUS
Status: DISCONTINUED | OUTPATIENT
Start: 2023-03-16 | End: 2023-03-16 | Stop reason: HOSPADM

## 2023-03-16 RX ORDER — MIDAZOLAM HYDROCHLORIDE 1 MG/ML
.25-5 INJECTION, SOLUTION INTRAMUSCULAR; INTRAVENOUS
Status: DISCONTINUED | OUTPATIENT
Start: 2023-03-16 | End: 2023-03-16 | Stop reason: HOSPADM

## 2023-03-16 RX ORDER — PROPOFOL 10 MG/ML
INJECTION, EMULSION INTRAVENOUS AS NEEDED
Status: DISCONTINUED | OUTPATIENT
Start: 2023-03-16 | End: 2023-03-16 | Stop reason: HOSPADM

## 2023-03-16 RX ORDER — LIDOCAINE HYDROCHLORIDE 20 MG/ML
INJECTION, SOLUTION EPIDURAL; INFILTRATION; INTRACAUDAL; PERINEURAL AS NEEDED
Status: DISCONTINUED | OUTPATIENT
Start: 2023-03-16 | End: 2023-03-16 | Stop reason: HOSPADM

## 2023-03-16 RX ADMIN — PROPOFOL 70 MG: 10 INJECTION, EMULSION INTRAVENOUS at 11:22

## 2023-03-16 RX ADMIN — LIDOCAINE HYDROCHLORIDE 20 MG: 20 INJECTION, SOLUTION EPIDURAL; INFILTRATION; INTRACAUDAL; PERINEURAL at 11:20

## 2023-03-16 RX ADMIN — LIDOCAINE HYDROCHLORIDE 60 MG: 20 INJECTION, SOLUTION EPIDURAL; INFILTRATION; INTRACAUDAL; PERINEURAL at 11:09

## 2023-03-16 RX ADMIN — SODIUM CHLORIDE: 0.9 INJECTION, SOLUTION INTRAVENOUS at 11:02

## 2023-03-16 RX ADMIN — LIDOCAINE HYDROCHLORIDE 20 MG: 20 INJECTION, SOLUTION EPIDURAL; INFILTRATION; INTRACAUDAL; PERINEURAL at 11:24

## 2023-03-16 RX ADMIN — PROPOFOL 60 MG: 10 INJECTION, EMULSION INTRAVENOUS at 11:14

## 2023-03-16 RX ADMIN — LIDOCAINE HYDROCHLORIDE 30 MG: 20 INJECTION, SOLUTION EPIDURAL; INFILTRATION; INTRACAUDAL; PERINEURAL at 11:16

## 2023-03-16 NOTE — PROGRESS NOTES
Endoscopy Case End Note:     Procedure scope was pre-cleaned, per protocol, at bedside by WALTER Martinez. Report received from anesthesia. See anesthesia flowsheet for intra-procedure vital signs and events. Belongings including cane remain under stretcher with patient.

## 2023-03-16 NOTE — ANESTHESIA POSTPROCEDURE EVALUATION
Procedure(s):  COLONOSCOPY  ENDOSCOPIC POLYPECTOMY. total IV anesthesia    Anesthesia Post Evaluation        Patient location during evaluation: PACU  Note status: Adequate. Level of consciousness: responsive to verbal stimuli and sleepy but conscious  Pain management: satisfactory to patient  Airway patency: patent  Anesthetic complications: no  Cardiovascular status: acceptable  Respiratory status: acceptable  Hydration status: acceptable  Comments: +Post-Anesthesia Evaluation and Assessment    Patient: Moira Wagner MRN: [id number]  SSN: [id number]   YOB: 1950  Age: 71 y.o. Sex: female      Cardiovascular Function/Vital Signs    BP (!) 153/43   Pulse (!) 58   Temp 36.3 °C (97.4 °F)   Resp 20   Ht 5' 2\" (1.575 m)   Wt 92 kg (202 lb 14.4 oz)   SpO2 100%   Breastfeeding No   BMI 37.11 kg/m²     Patient is status post Procedure(s):  COLONOSCOPY  ENDOSCOPIC POLYPECTOMY. Nausea/Vomiting: Controlled. Postoperative hydration reviewed and adequate. Pain:  Pain Scale 1: Numeric (0 - 10) (03/16/23 1101)  Pain Intensity 1: 0 (03/16/23 1101)   Managed. Neurological Status: At baseline. Mental Status and Level of Consciousness: Arousable. Pulmonary Status:   O2 Device: None (Room air) (03/16/23 1130)   Adequate oxygenation and airway patent. Complications related to anesthesia: None    Post-anesthesia assessment completed. No concerns.     Signed By: Stefan Ludwig MD    3/16/2023  Post anesthesia nausea and vomiting:  controlled      INITIAL Post-op Vital signs:   Vitals Value Taken Time   /43 03/16/23 1130   Temp     Pulse 58 03/16/23 1130   Resp 20 03/16/23 1130   SpO2 100 % 03/16/23 1130

## 2023-03-16 NOTE — PROCEDURES
NAME:  Franky Pressley   :   1950   MRN:   [id number]     Date/Time:  3/16/2023 11:33 AM    Colonoscopy Operative Report    Procedure Type:   Colonoscopy with polypectomy (cold snare)     Indications:    Positive Cologuard stool test R19.5  Pre-operative Diagnosis: see indication above  Post-operative Diagnosis:  See findings below  :  Russ Nur MD  Referring Provider: Ramiro Rosenberg MD    Exam:  Airway: clear, no airway problems anticipated  Heart: RRR, without gallops or rubs  Lungs: clear bilaterally without wheezes, crackles, or rhonchi  Abdomen: soft, nontender, nondistended, bowel sounds present  Mental Status: awake, alert and oriented to person, place and time    Sedation:  MAC anesthesia Propofol 130mg IV  Procedure Details:  After informed consent was obtained with all risks and benefits of procedure explained and preoperative exam completed, the patient was taken to the endoscopy suite and placed in the left lateral decubitus position. Upon sequential sedation as per above, a digital rectal exam was performed demonstrating no hemorrhoids. The Olympus videocolonoscope  was inserted in the rectum and carefully advanced to the cecum, which was identified by the ileocecal valve and appendiceal orifice. The distal terminal ileum was evaluated. The quality of preparation was adequate. The colonoscope was slowly withdrawn with careful evaluation between folds. Retroflexion in the rectum was completed demonstrating no hemorrhoids. Findings:     -Normal terminal ileum  -Single diminutive 3mm sessile polyp in the descending colon at 65cm; removed with cold snare    Specimen Removed:  #1 desc colon polyp  Complications: None. EBL:  None. Impression:    -Normal terminal ileum  -Single diminutive 3mm sessile polyp in the descending colon at 65cm; removed with cold snare    Recommendations: --Await pathology. , -If adenoma is present, repeat colonoscopy in 5 years; however, if polyp is only hyperplastic, repeat colonoscopy indicated in 10yrs. High fiber diet. Resume normal medication(s). Resume Xaralto tomorrow AM. Ashish Fernandez will receive a letter about the biopsy results in about 10 days. You may be asked to call your doctor's office for the results. Discharge Disposition:  Home in the company of a  when able to ambulate.     Vikki Yap MD

## 2023-03-16 NOTE — ROUTINE PROCESS
[facility]  1950  [id number]    Situation:  Verbal report received from: TAMMIE Mancuso RN  Procedure: Procedure(s):  COLONOSCOPY  ENDOSCOPIC POLYPECTOMY    Background:    Preoperative diagnosis: Positive colorectal cancer screening using Cologuard test [R19.5]  Anticoagulant long-term use [Z79.01]  Postoperative diagnosis: COLON: Polyp    :  Dr. Jalen Morrison  Assistant(s): Endoscopy Technician-1: Benson Banda  Endoscopy RN-1: Papo Hawkins RN    Specimens:   ID Type Source Tests Collected by Time Destination   1 : Descending Colon Polyps Preservative Colon, Descending  Christiano Escalante MD 3/16/2023 1121 Pathology     H. Pylori  no    Assessment:  Intra-procedure medications     Anesthesia gave intra-procedure sedation and medications, see anesthesia flow sheet yes    Intravenous fluids: NS@ KVO     Vital signs stable    yes    Abdominal assessment: round and soft   yes    Recommendation:  Discharge patient per MD order   yes.     Family or [place] [facility], daughter  Permission to share finding with family or friend yes

## 2023-03-16 NOTE — DISCHARGE INSTRUCTIONS
Mellissa Rousseau  [id number]  1950    COLON DISCHARGE INSTRUCTIONS  Discomfort:  Redness at IV site- apply warm compress to area; if redness or soreness persist- contact your physician  There may be a slight amount of blood passed from the rectum  Gaseous discomfort- walking, belching will help relieve any discomfort  You may not operate a vehicle for 12 hours  You may not engage in an occupation involving machinery or appliances for rest of today  You may not drink alcoholic beverages for at least 12 hours  Avoid making any critical decisions for at least 24 hour  DIET:   High fiber diet. - however -  remember your colon is empty and a heavy meal will produce gas. Avoid these foods:  vegetables, fried / greasy foods, carbonated drinks for today  MEDICATION:         ACTIVITY:  You may not resume your normal daily activities until tomorrow AM; it is recommended that you spend the remainder of the day resting -  avoid any strenuous activity. CALL M.D. ANY SIGN OF:   Increasing pain, nausea, vomiting  Abdominal distension (swelling)  New increased bleeding (oral or rectal)  Fever (chills)  Pain in chest area  Bloody discharge from nose or mouth  Shortness of breath    IMPRESSION:  -Normal terminal ileum  -Single diminutive 3mm sessile polyp in the descending colon at 65cm; removed with cold snare    Follow-up Instructions:  -Call Dr. Jose Bates for the results of procedure / biopsy in 7-10 days  -Telephone # [phone]  -Await pathology. ,   -If adenoma is present, repeat colonoscopy in 5 years; however, if polyp is only hyperplastic, repeat colonoscopy indicated in 10yrs.    -Resume Xarelto tomorrow AM    Verito Coon MD       Patient Education on Sedation / Analgesia Administered for Procedure      For 24 hours after general anesthesia or intravenous analgesia / sedation:  Have someone responsible help you with your care  Limit your activities  Do not drive and operate hazardous machinery  Do not make important personal, legal or business decisions  Do not drink alcoholic beverages  If you have not urinated within 8 hours after discharge, please contact your physician  Resume your medications unless otherwise instructed    For 24 hours after general anesthesia or intravenous analgesia / sedation  you may experience:  Drowsiness, dizziness, sleepiness, or confusion  Difficulty remembering or delayed reaction times  Difficulty with your balance, especially while walking, move slowly and carefully, do not make sudden position changes  Difficulty focusing or blurred vision    You may not be aware of slight changes in your behavior and/or your reaction time because of the medication used during and after your procedure.     Report the following to your physician:  Excessive pain, swelling, redness or odor of or around the surgical area  Temperature over 100.5  Nausea and vomiting lasting longer than 4 hours or if unable to take medications  Any signs of decreased circulation or nerve impairment to extremity: change in color, persistent numbness, tingling, coldness or increase pain  Any questions or concerns    IF YOU REPORT TO AN EMERGENCY ROOM, DOCTOR'S OFFICE OR HOSPITAL WITHIN 24 HOURS AFTER YOUR PROCEDURE, BRING THIS SHEET AND YOUR AFTER VISIT SUMMARY WITH YOU AND GIVE IT TO THE PHYSICIAN OR NURSE ATTENDING YOU.

## 2023-03-16 NOTE — ANESTHESIA PREPROCEDURE EVALUATION
Relevant Problems   No relevant active problems       Anesthetic History   No history of anesthetic complications            Review of Systems / Medical History  Patient summary reviewed, nursing notes reviewed and pertinent labs reviewed    Pulmonary  Within defined limits                 Neuro/Psych             Comments: Lumbar spinal stenosis Cardiovascular    Hypertension        Dysrhythmias : atrial fibrillation      Exercise tolerance: <4 METS  Comments: HX AFIB ABLATION      GI/Hepatic/Renal  Within defined limits              Endo/Other        Morbid obesity     Other Findings   Comments: HX GASTRIC BYPASS            Physical Exam    Airway  Mallampati: I    Neck ROM: normal range of motion   Mouth opening: Normal     Cardiovascular  Regular rate and rhythm,  S1 and S2 normal,  no murmur, click, rub, or gallop             Dental      Comments: Several missing teeth, denies any loose teeth   Pulmonary      Decreased breath sounds: bibasilar           Abdominal  GI exam deferred       Other Findings            Anesthetic Plan    ASA: 3  Anesthesia type: total IV anesthesia          Induction: Intravenous  Anesthetic plan and risks discussed with: Patient

## 2023-03-17 ENCOUNTER — HOSPITAL ENCOUNTER (OUTPATIENT)
Dept: PHYSICAL THERAPY | Age: 73
Discharge: HOME | End: 2023-03-17
Payer: MEDICARE

## 2023-03-17 VITALS — BODY MASS INDEX: 37.69 KG/M2 | WEIGHT: 204.8 LBS | HEIGHT: 62 IN

## 2023-03-17 PROCEDURE — 97140 MANUAL THERAPY 1/> REGIONS: CPT

## 2023-03-17 PROCEDURE — 97110 THERAPEUTIC EXERCISES: CPT

## 2023-03-17 NOTE — PROGRESS NOTES
LYMPHEDEMA PT DAILY TREATMENT NOTE - [employer] 2-15    Patient Name: Mayra Ritter  Date:3/17/2023  : 1950  [x]  Patient  Verified  Payor: Karla Pike / Plan: [employer] A[employer] MEDICARE COMPLETE / Product Type: Managed Care Medicare /    In time: 1:20 pm  Out time: 2:20 pm  Total Treatment Time (min): 60  Total Timed Codes (min): 60  1:1 Treatment Time ( only): 60  Visit #:  4    Treatment Area: Lymphedema, not elsewhere classified [I89.0] B JERE     SUBJECTIVE  Pain Level (0-10 scale): no reports of pain. Any medication changes, allergies to medications, adverse drug reactions, diagnosis change, or new procedure performed?: [] No    [x] Yes (see summary sheet for update) Patient reports having the following testing completed since last seen in our clinic: Lipid Scan which was negative, CT Angiogram which she reports was good, and a Colonoscopy with polypectomy yesterday. She reports being scheduled to have a ELIZABETH with Ablation next month. She reports that Dr. Jasvir Alford prescribed a new medication: Bumex but she has not taken it yet. Subjective functional status/changes:   [] No changes reported  Patient arrives to the visit without compression products in place. She reports having a few tests completed since last seen in our clinic. She reports making an effort to wear the lower leg velcro products on the legs several hours each day. OBJECTIVE    10 min Therapeutic Exercise:  [] Euceda Kehr Exercises [x] Remedial Lymphedema Exercise Program - continued education in routine today with patient able to complete 5 reps of each exercise [] Axillary Web Exercise Program      [] Stick Routine    Rationale: Activate muscle pump to improve lymphatic fluid movement and decrease swelling to improve the patients ability to perform ADL and IADL skills and prevent worsening of swelling over time.     50 min Manual Therapy    Kinesiotaping: deferred     Manual Lymphatic Drainage (MLD):  Area to decongest: B JERE's Sequence used and effectiveness: Deferred. Patient was educated in self MLD packet during a previous visit. Skin/wound care/debridement: Patient arrived to the clinic with wounds on the lower legs covered with large band aids. The wounds appear clean with drainage present. Patient's extremity bathed with soap and water. Performed skin care: applied A and D ointment to open areas and Remedy/Aquaphor lotion to surrounding areas. Covered with ABD pad and wrapped in Kerlix. Patient to inspect wound daily and keep covered. Prevention of cellulitis - reviewed signs and symptoms today and patient to call the physician with any concerns. Applied multi-layer compression bandaging to: Discussed the role and benefit of MLB/compression products with patient again today. Discussed that bandaging would assist with reduction of volume in the legs and promote wound healing. Patient is unsure of her willingness to pursue bandaging. Upper/Lower Extremity Compression: Patient has the following compression products and reports wearing them several hours each day. She arrived to the clinic without compression products in place. B LE       Style: Velcro    Brand: 1. Two [employer] Ready Wrap calf units in size XX large regular length and  2. One pair of Circaid compressive under socks 25-35 mmHg size large - patient provided with information to purchase additional liner socks when ready. Type: Non-Custom: Size: XX large in regular length    Vendor: [employer]    Education: Daily wear schedule. Daily laundering. Garment lifespan. Return and reordering process (by bringing prior garments into the clinic). Educated patient to monitor for redness or pressure points on the skin. If new pain occurs they should contact their therapist.  Tashia Milder and doffing.    Remove with any shortness of breath or heart palpitations   Rationale: increase tissue extensibility and decrease edema  to improve the patients ability to reduce the risk of infection, wounds, and progression of swelling over time. 0   Vasopneumatic Device:   Patient is not interested in obtaining a pump at this time and would need to be cleared by Cardiology prior to obtaining a pump in the future. Rationale: To improve lymphatic fluid movement and decrease swelling to improve the patients ability to perform ADL and IADL skills and prevent worsening of swelling over time. With   [x] TE   [] TA   [x] MT   [] SC   [] other: Patient Education: [x] Review HEP    [x] MLD Patient Education with skin care  [x] Progressed/Changed HEP based on:   [x] positioning   [] Kinesiotape   [x] Skin care   [x] wound care   [x] other: management of compression products, precautions, wear schedule  []   Patient / caregiver re-demonstrated bandaging. [] Yes  [] No  Compression bandaging/garment precautions reviewed: [x] Yes  [] No     Other Objective/Functional Measures:    Height:  Height: 5' 2\" (157.5 cm)  Weight:  Weight: 92.9 kg (204 lb 12.8 oz)   BMI:  BMI (calculated): 37.4  (36 or greater: adversely affecting lymphedema)    Full LE volumes taken today:  R lower extremity 16,884.61 ml today compared to 14,289.69 ml 2/17/23 compared to 15,461.32 ml on evaluation 12/21/23. L lower extremity 14,737.15 ml today compared to 12,386.32 ml 2/17/23 compared to 13,270.66 ml on evaluation 12/21/23. Percentage difference 14.57% today compared to 16.51% R LE > L LE on evaluation    Pain Level (0-10 scale) post treatment: No pain reported. ASSESSMENT/Changes in Function:   Patient was last seen in the clinic one month ago. She was to complete cardiac testing and work with her new compression products and return today for assessment of swelling. Full LE volumes taken today reveal an increase of more than 1400 ml in each leg since evaluation. Wounds on the legs are worsening and patient is at increased risk of infection.  Patient reports wearing compression products on a daily basis, however, she arrives to the visit today without compression products in place. She would benefit from daily use of velcro products or light multi-layer compression bandaging to reduce swelling, improve the shape of the lower legs, and promote wound healing. Discussed that swelling and wounds will most likely worsen over time but she has to be ready to fully participate in complete decongestive therapy. She will work on her home program and wear compression products daily as tolerated and return to the clinic in 10 days. Patient will continue to benefit from skilled PT services to  address swelling, analyze and address soft tissue restrictions, analyze compression product fit and use, instruct in home lymphedema management program, and modify and progress therapeutic interventions to attain remaining goals. []  See Plan of Care  [x]  See progress note/recertification  []  See Discharge Summary         Progress towards goals / Updated goals:  Short Term Goals: To be met by 2/1/2023. Will extend short term goal 1 and 2 to be met by 3/17/23. 1.  Patient/Caregiver will demonstrate knowledge of signs/symptoms of infections/cellulitis and be independent in skin care to prevent cellulitis. Educated in skin and wound care and signs and symptoms of infection. Goal met 3/17/23  2. Patient/Caregiver will demonstrate independence in lymphedema home program of therapeutic exercises to improve circulation and decongest limb to improve ADLs. Educated in the remedial lymphedema exercises and patient reports participating in a daily walking program. Goal met 3/17/23. 3.  Patient/Caregiver will tolerate multi-layer bandages (MLB) and show measureable decrease in limb volume and/or participate in the selection process to allow ordering of home compression system (daytime, nighttime garments and pump as needed). Goal met 2/17/23. Long Term Goals:  To be met by 3/17/2023, extend long term goals to be met by 6/12/23  1. Patient/Caregiver will show improvement in the Lymphedema Life Impact Scale by decreasing the score to 18 and thus allow improvement in patient's quality of life. Patient scored a 3 on the LLIS today. Goal met 2/17/23. 2.  Patient/Caregiver will be independent with don/doff of compression system and use in order to prevent reaccumulation of fluid at discharge. In progress. 3. Patient/Caregiver will be independent in home lymphedema management and demonstrate stable or reduced limb volumes to indicate appropriateness for restorative phase of care. In progress. New long term goal:  4. Wounds on the lower legs will be stable and/or healed prior to discharge to the restorative phase of care.      PLAN  []  Upgrade activities as tolerated     [x]  Extend plan of care  []  Update interventions per flow sheet       []  Discharge due to:_  []  Other:_      Verena Crane, PT, CLT 3/17/2023

## 2023-03-17 NOTE — THERAPY RECERTIFICATION
[address] [place] [address] [facility]  [address], [address], [place]  [address].    Continued Plan of Care/ Re-certification for Physical or Occupational Jose Guadalupe MD Quintin   Provider # [id number]                    Diagnosis: Lymphedema, not elsewhere classified [I89.0]  Onset Date: 2018  New procedures:  Patient reports having the following testing completed since last seen in our clinic: Lipid Scan which was negative, CT Angiogram which she reports was good, and a Colonoscopy with polypectomy yesterday. She reports being scheduled to have a ELIZABETH with Ablation next month. She reports that Dr. Katiana Tracy prescribed a new medication: Bumex but she has not taken it yet. Visits from Start of Care: 4     Missed Visits: 3  Start of Care: 12/21/22  Prior Level of Function: Patient is modified independent with self care and household chores; uses a narrow based quad cane in the community and no assistive device in the house. She has difficulty with stairs. The Plan of Care and following information is based on the patient's current status:  Progress towards goals / Updated goals:  Short Term Goals: To be met by 2/1/2023. Will extend short term goal 1 and 2 to be met by 3/17/23. 1.  Patient/Caregiver will demonstrate knowledge of signs/symptoms of infections/cellulitis and be independent in skin care to prevent cellulitis. Educated in skin and wound care and signs and symptoms of infection. Goal met 3/17/23  2. Patient/Caregiver will demonstrate independence in lymphedema home program of therapeutic exercises to improve circulation and decongest limb to improve ADLs. Educated in the remedial lymphedema exercises and patient reports participating in a daily walking program. Goal met 3/17/23.    3.  Patient/Caregiver will tolerate multi-layer bandages (MLB) and show measureable decrease in limb volume and/or participate in the selection process to allow ordering of home compression system (daytime, nighttime garments and pump as needed). Goal met 2/17/23. Long Term Goals: To be met by 3/17/2023, extend long term goals to be met by 6/12/23  1. Patient/Caregiver will show improvement in the Lymphedema Life Impact Scale by decreasing the score to 18 and thus allow improvement in patient's quality of life. Patient scored a 3 on the LLIS today. Goal met 2/17/23. 2.  Patient/Caregiver will be independent with don/doff of compression system and use in order to prevent reaccumulation of fluid at discharge. In progress. 3. Patient/Caregiver will be independent in home lymphedema management and demonstrate stable or reduced limb volumes to indicate appropriateness for restorative phase of care. In progress. New long term goal:  4. Wounds on the lower legs will be stable and/or healed prior to discharge to the restorative phase of care. Problems/ barriers to goal attainment: multiple co-morbidities, poor compliance with compression products/home program, skin integrity issues     Problem List: LE swelling and wounds, increased risk of infection. Treatment Plan: wound care, multi-layer compression bandaging, manual lymphatic drainage, skin care, patient education     Goals for this certification period to be accomplished in 12 weeks. Frequency / Duration: Patient to be seen 3 to 8 visits over the next 12 weeks. Assessment / Recommendations:Patient was last seen in the clinic one month ago. She was to complete cardiac testing and work with her new compression products and return today for assessment of swelling. Full LE volumes taken today reveal an increase of more than 1400 ml in each leg since evaluation. Wounds on the legs are worsening and patient is at increased risk of infection. Patient reports wearing compression products on a daily basis, however, she arrives to the visit today without compression products in place.  She would benefit from daily use of velcro products or light multi-layer compression bandaging to reduce swelling, improve the shape of the lower legs, and promote wound healing. Discussed that swelling and wounds will most likely worsen over time but she has to be ready to fully participate in complete decongestive therapy. She will work on her home program and wear compression products daily as tolerated and return to the clinic in 10 days. Patient will continue to benefit from skilled PT services to  address swelling, analyze and address soft tissue restrictions, analyze compression product fit and use, instruct in home lymphedema management program, and modify and progress therapeutic interventions to attain remaining goals    Certification Period: 3/17/2023-6/12/2023    Soledad Jacob, PT, CLT 3/17/2023    ________________________________________________________________________  I certify that the above Therapy Services are being furnished while the patient is under my care. I agree with the treatment plan and certify that this therapy is necessary. Y or N I have read the above and request that my patient continue as recommended.   Y or N I have read the above report and request that my patient continue therapy with the following changes/special instructions  Y or N I have read the above report and request that my patient be discharged from therapy    Physician's Signature:_________________ Date:___________Time:__________           Che Deleon MD

## 2023-03-29 ENCOUNTER — HOSPITAL ENCOUNTER (OUTPATIENT)
Dept: PHYSICAL THERAPY | Age: 73
Discharge: HOME | End: 2023-03-29
Payer: MEDICARE

## 2023-03-29 VITALS — BODY MASS INDEX: 37.94 KG/M2 | HEIGHT: 62 IN | WEIGHT: 206.2 LBS

## 2023-03-29 PROCEDURE — 97110 THERAPEUTIC EXERCISES: CPT

## 2023-03-29 PROCEDURE — 97140 MANUAL THERAPY 1/> REGIONS: CPT

## 2023-03-29 NOTE — PROGRESS NOTES
[facility]  a part of [facility]  [address] [facility], [address] [facility], [address]  Phone: [phone]  Fax: [phone]        Progress Note    Name: Murtaza Harry   : 1950   MD: Akash Mcmillan MD       Treatment Diagnosis: Lymphedema, not elsewhere classified [I89.0]  Start of Care: 2023    Visits from Start of Care: 5  Missed Visits: 3    Summary of Care:  measuring and fitting of compression products, exercise, manual lymphatic drainage, wound care, patient education    Assessment / Recommendations: Patient is returning to the clinic today with continual B LE swelling and skin integrity issues. Patient admits to being a noncompliant patient and is struggling with her home program and use of compression products. Swelling in the legs and the wounds on the L lower leg have worsened since evaluation in December. Continued education in tips (exercise, elevation, daily use of compression products) for better management of swelling. Patient would benefit from having a home vaso-pneumatic device if cleared by Cardiology as another tool to manage swelling during the restorative phase of care. She has a scheduled cardiac procedure next week and then will return to our clinic in 2 to 3 weeks for follow up. Patient will continue to benefit from skilled PT services to  address swelling, analyze and address soft tissue restrictions, analyze compression product fit and use, instruct in home lymphedema management program, and modify and progress therapeutic interventions to attain remaining goals. Progress towards goals / Updated goals:  Short Term Goals: To be met by 2023. Will extend short term goal 1 and 2 to be met by 3/17/23. 1.  Patient/Caregiver will demonstrate knowledge of signs/symptoms of infections/cellulitis and be independent in skin care to prevent cellulitis. Educated in skin and wound care and signs and symptoms of infection.  Goal met 3/17/23  2. Patient/Caregiver will demonstrate independence in lymphedema home program of therapeutic exercises to improve circulation and decongest limb to improve ADLs. Educated in the remedial lymphedema exercises and patient reports participating in a daily walking program. Goal met 3/17/23. 3.  Patient/Caregiver will tolerate multi-layer bandages (MLB) and show measureable decrease in limb volume and/or participate in the selection process to allow ordering of home compression system (daytime, nighttime garments and pump as needed). Goal met 2/17/23. Long Term Goals: To be met by 3/17/2023, extend long term goals to be met by 6/12/23  1. Patient/Caregiver will show improvement in the Lymphedema Life Impact Scale by decreasing the score to 18 and thus allow improvement in patient's quality of life. Patient scored a 3 on the LLIS today. Goal met 2/17/23. 2.  Patient/Caregiver will be independent with don/doff of compression system and use in order to prevent reaccumulation of fluid at discharge. In progress. 3. Patient/Caregiver will be independent in home lymphedema management and demonstrate stable or reduced limb volumes to indicate appropriateness for restorative phase of care. In progress. New long term goal:  4. Wounds on the lower legs will be stable and/or healed prior to discharge to the restorative phase of care. In progress. Other: continue therapy after cardiac procedure is completed.       Catrina Wharton, PT, CLT  3/29/2023

## 2023-03-29 NOTE — PROGRESS NOTES
LYMPHEDEMA PT DAILY TREATMENT NOTE - [facility] 2-15    Patient Name: Misti Kearney  Date:3/29/2023  : 1950  [x]  Patient  Verified  Payor: Jose Rafael Araujo / Plan: [facility] MEDICARE COMPLETE / Product Type: Managed Care Medicare /    In time: 2:30 pm  Out time: 3:30 pm  Total Treatment Time (min): 60  Total Timed Codes (min): 60  1:1 Treatment Time ( only): 60  Visit #:  5    Treatment Area: Lymphedema, not elsewhere classified [I89.0] B LE     SUBJECTIVE  Pain Level (0-10 scale): No pain reported. Any medication changes, allergies to medications, adverse drug reactions, diagnosis change, or new procedure performed?: [] No    [x] Yes (see summary sheet for update) Patient reports being scheduled to have a ELIZABETH with Ablation . She reports that Dr. India Bello prescribed a new medication: Bumex but she has not taken it yet. Subjective functional status/changes:   [] No changes reported  Patient reports making an effort to wear compression products on the R lower leg but not the L lower leg due to draining wounds. The wounds on the L leg seem worse. OBJECTIVE    10 min Therapeutic Exercise:  [x] Alin Zamora Exercises - sitting routine today [x] Remedial Lymphedema Exercise Program [] Axillary Web Exercise Program      [] Stick Routine    Rationale: Activate muscle pump to improve lymphatic fluid movement and decrease swelling to improve the patients ability to perform ADL and IADL skills and prevent worsening of swelling over time. 50 min Manual Therapy    Manual Lymphatic Drainage (MLD):  Area to decongest: B JERE's   Sequence used and effectiveness: Deferred. Patient was educated in self MLD packet during a previous visit. Skin/wound care/debridement: Patient arrived to the clinic with wounds on the L lower leg covered with band aids. The wounds appear clean with clear drainage present. 3/17/23    3/17/23      3/29/23      Patient's extremities bathed with soap and water.  Applied Eucerin lotion to the R LE using manual lymphatic drainage principles. L LE skin care included:  applied A and D ointment to open areas, Remedy Calazine lotion to tenuous skin, and Eucerin lotion to intact skin. Covered open wounds with ABD pad and applied the Solaris Ready Wrap/liner sock to the L LE. Recommended patient avoid band aids on tenuous skin. 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 Discussed the benefit of wearing compression products to assist with wound healing. Prevention of cellulitis - reviewed signs and symptoms today and patient to call the physician with any concerns. Applied multi-layer compression bandaging to: Discussed the role and benefit of MLB with patient again today. Patient is not willing to pursue bandaging due to the time commitment and care giving responsibilities. Upper/Lower Extremity Compression: Patient arrived to the clinic wearing a Solaris Ready Wrap calf unit on the R LE and no compression product on the L LE. Continued education in importance of wearing both compression products on a daily basis for management of swelling and to promote wound healing. B LE       Style: Velcro    Brand: 1. Two Solaris Ready Wrap calf units in size XX large regular length and  2. One pair of Circaid compressive under socks 25-35 mmHg size large - patient provided with information to purchase additional liner socks when ready. Type: Non-Custom: Size: XX large in regular length    Vendor: [employer]    Education: Daily wear schedule. Daily laundering. Garment lifespan. Return and reordering process (by bringing prior garments into the clinic). Educated patient to monitor for redness or pressure points on the skin. If new pain occurs they should contact their therapist.  Aleah Heck and liberty.    Remove with any shortness of breath or heart palpitations   Rationale: increase tissue extensibility and decrease edema  to improve the patients ability to reduce the risk of infection, wounds, and progression of swelling over time. 0   Vasopneumatic Device:   Patient may be interested in having a pump trial in a future visit. Patient will need to be cleared by Cardiology for pump use. Provided patient with a written brochure to take to her next appointment. Rationale: To improve lymphatic fluid movement and decrease swelling to improve the patients ability to perform ADL and IADL skills and prevent worsening of swelling over time. With   [x] TE   [] TA   [x] MT   [] SC   [] other: Patient Education: [x] Review HEP    [x] MLD Patient Education with skin care  [x] Progressed/Changed HEP based on:   [x] positioning   [] Kinesiotape   [x] Skin care   [x] wound care   [x] other: management of compression products, precautions, wear schedule  []   Patient / caregiver re-demonstrated bandaging. [] Yes  [] No  Compression bandaging/garment precautions reviewed: [x] Yes  [] No     Other Objective/Functional Measures:    Height:  Height: 5' 2\" (157.5 cm)  Weight:  Weight: 93.5 kg (206 lb 3.2 oz)   BMI:  BMI (calculated): 37.7  (36 or greater: adversely affecting lymphedema)    Full LE volumes taken today:  R lower extremity 16,747.27 ml compared to 15,461.32 ml on evaluation 12/21/23. L lower extremity 14,578.36 ml today compared to 13,270.66 ml on evaluation 12/21/23. Percentage difference 14.88% today compared to 16.51% R LE > L LE on evaluation    Pain Level (0-10 scale) post treatment: No pain reported. ASSESSMENT/Changes in Function:   Patient is returning to the clinic today with continual B LE swelling and skin integrity issues. Patient admits to being a noncompliant patient and is struggling with her home program and use of compression products. Swelling in the legs and the wounds on the L lower leg have worsened since evaluation in December.  Continued education in tips (exercise, elevation, daily use of compression products) for better management of swelling. Patient would benefit from having a home vaso-pneumatic device if cleared by Cardiology as another tool to manage swelling during the restorative phase of care. She has a scheduled cardiac procedure next week and then will return to our clinic in 2 to 3 weeks for follow up. Patient will continue to benefit from skilled PT services to  address swelling, analyze and address soft tissue restrictions, analyze compression product fit and use, instruct in home lymphedema management program, and modify and progress therapeutic interventions to attain remaining goals. []  See Plan of Care  [x]  See progress note/recertification  []  See Discharge Summary         Progress towards goals / Updated goals:  Short Term Goals: To be met by 2/1/2023. Will extend short term goal 1 and 2 to be met by 3/17/23. 1.  Patient/Caregiver will demonstrate knowledge of signs/symptoms of infections/cellulitis and be independent in skin care to prevent cellulitis. Educated in skin and wound care and signs and symptoms of infection. Goal met 3/17/23  2. Patient/Caregiver will demonstrate independence in lymphedema home program of therapeutic exercises to improve circulation and decongest limb to improve ADLs. Educated in the remedial lymphedema exercises and patient reports participating in a daily walking program. Goal met 3/17/23. 3.  Patient/Caregiver will tolerate multi-layer bandages (MLB) and show measureable decrease in limb volume and/or participate in the selection process to allow ordering of home compression system (daytime, nighttime garments and pump as needed). Goal met 2/17/23. Long Term Goals: To be met by 3/17/2023, extend long term goals to be met by 6/12/23  1. Patient/Caregiver will show improvement in the Lymphedema Life Impact Scale by decreasing the score to 18 and thus allow improvement in patient's quality of life.  Patient scored a 3 on the LLIS today. Goal met 2/17/23. 2.  Patient/Caregiver will be independent with don/doff of compression system and use in order to prevent reaccumulation of fluid at discharge. In progress. 3. Patient/Caregiver will be independent in home lymphedema management and demonstrate stable or reduced limb volumes to indicate appropriateness for restorative phase of care. In progress. New long term goal:  4. Wounds on the lower legs will be stable and/or healed prior to discharge to the restorative phase of care. In progress.      PLAN  []  Upgrade activities as tolerated     [x]  Continue plan of care  []  Update interventions per flow sheet       []  Discharge due to:_  []  Other:_      Peter Suarez PT, CLT 3/29/2023

## 2023-04-14 ENCOUNTER — HOSPITAL ENCOUNTER (OUTPATIENT)
Dept: MAMMOGRAPHY | Age: 73
Discharge: HOME | End: 2023-04-14
Attending: INTERNAL MEDICINE
Payer: MEDICARE

## 2023-04-14 DIAGNOSIS — Z12.31 ENCOUNTER FOR SCREENING MAMMOGRAM FOR MALIGNANT NEOPLASM OF BREAST: ICD-10-CM

## 2023-04-14 PROCEDURE — 77067 SCR MAMMO BI INCL CAD: CPT

## 2023-05-16 RX ORDER — LANOLIN ALCOHOL/MO/W.PET/CERES
CREAM (GRAM) TOPICAL DAILY
Status: ON HOLD | COMMUNITY
End: 2023-06-22 | Stop reason: HOSPADM

## 2023-05-16 RX ORDER — LISINOPRIL AND HYDROCHLOROTHIAZIDE 12.5; 1 MG/1; MG/1
TABLET ORAL DAILY
Status: ON HOLD | COMMUNITY
End: 2023-06-22 | Stop reason: HOSPADM

## 2023-05-16 RX ORDER — FERROUS SULFATE 325(65) MG
TABLET ORAL
Status: ON HOLD | COMMUNITY
End: 2023-06-22 | Stop reason: HOSPADM

## 2023-05-16 RX ORDER — SOTALOL HYDROCHLORIDE 80 MG/1
80 TABLET ORAL 2 TIMES DAILY
Status: ON HOLD | COMMUNITY
Start: 2022-08-12 | End: 2023-06-22 | Stop reason: HOSPADM

## 2023-05-16 RX ORDER — ATORVASTATIN CALCIUM 40 MG/1
40 TABLET, FILM COATED ORAL DAILY
Status: ON HOLD | COMMUNITY

## 2023-05-16 RX ORDER — BUMETANIDE 1 MG/1
1 TABLET ORAL DAILY
Status: ON HOLD | COMMUNITY
Start: 2022-08-24 | End: 2023-06-22 | Stop reason: HOSPADM

## 2023-06-14 ENCOUNTER — APPOINTMENT (OUTPATIENT)
Facility: HOSPITAL | Age: 73
DRG: 871 | End: 2023-06-14
Payer: MEDICARE

## 2023-06-14 ENCOUNTER — HOSPITAL ENCOUNTER (INPATIENT)
Facility: HOSPITAL | Age: 73
LOS: 8 days | Discharge: NURSING, REHAB OR LONG-TERM CARE | DRG: 871 | End: 2023-06-22
Attending: EMERGENCY MEDICINE | Admitting: INTERNAL MEDICINE
Payer: MEDICARE

## 2023-06-14 DIAGNOSIS — E86.0 DEHYDRATION: ICD-10-CM

## 2023-06-14 DIAGNOSIS — N17.9 ACUTE RENAL FAILURE, UNSPECIFIED ACUTE RENAL FAILURE TYPE (HCC): Primary | ICD-10-CM

## 2023-06-14 DIAGNOSIS — L03.116 CELLULITIS OF LEFT LOWER EXTREMITY: ICD-10-CM

## 2023-06-14 DIAGNOSIS — G93.41 ACUTE METABOLIC ENCEPHALOPATHY: ICD-10-CM

## 2023-06-14 DIAGNOSIS — I89.0 LYMPHEDEMA: ICD-10-CM

## 2023-06-14 DIAGNOSIS — E87.20 METABOLIC ACIDOSIS: ICD-10-CM

## 2023-06-14 PROBLEM — A41.9 SEPSIS (HCC): Status: ACTIVE | Noted: 2023-06-14

## 2023-06-14 LAB
ALBUMIN SERPL-MCNC: 3.3 G/DL (ref 3.5–5)
ALBUMIN/GLOB SERPL: 0.9 (ref 1.1–2.2)
ALP SERPL-CCNC: 100 U/L (ref 45–117)
ALT SERPL-CCNC: 15 U/L (ref 12–78)
ANION GAP BLD CALC-SCNC: 9 (ref 10–20)
ANION GAP SERPL CALC-SCNC: 27 MMOL/L (ref 5–15)
APPEARANCE UR: ABNORMAL
AST SERPL-CCNC: 20 U/L (ref 15–37)
BACTERIA URNS QL MICRO: ABNORMAL /HPF
BASE DEFICIT BLD-SCNC: 12.1 MMOL/L
BASOPHILS # BLD: 0 K/UL (ref 0–0.1)
BASOPHILS NFR BLD: 0 % (ref 0–1)
BILIRUB SERPL-MCNC: 0.6 MG/DL (ref 0.2–1)
BILIRUB UR QL CFM: NEGATIVE
BUN SERPL-MCNC: 129 MG/DL (ref 6–20)
BUN/CREAT SERPL: 24 (ref 12–20)
CA-I BLD-MCNC: 1 MMOL/L (ref 1.12–1.32)
CALCIUM SERPL-MCNC: 8.2 MG/DL (ref 8.5–10.1)
CHLORIDE BLD-SCNC: 98 MMOL/L (ref 100–108)
CHLORIDE SERPL-SCNC: 87 MMOL/L (ref 97–108)
CO2 BLD-SCNC: 14 MMOL/L (ref 19–24)
CO2 SERPL-SCNC: 12 MMOL/L (ref 21–32)
COLOR UR: ABNORMAL
COMMENT:: NORMAL
COMMENT:: NORMAL
CREAT SERPL-MCNC: 5.46 MG/DL (ref 0.55–1.02)
CREAT UR-MCNC: 5.9 MG/DL (ref 0.6–1.3)
CRP SERPL-MCNC: 2.24 MG/DL (ref 0–0.6)
DIFFERENTIAL METHOD BLD: ABNORMAL
EOSINOPHIL # BLD: 0 K/UL (ref 0–0.4)
EOSINOPHIL NFR BLD: 0 % (ref 0–7)
EPITH CASTS URNS QL MICRO: ABNORMAL /LPF
ERYTHROCYTE [DISTWIDTH] IN BLOOD BY AUTOMATED COUNT: 13.4 % (ref 11.5–14.5)
ERYTHROCYTE [SEDIMENTATION RATE] IN BLOOD: 52 MM/HR (ref 0–30)
GLOBULIN SER CALC-MCNC: 3.5 G/DL (ref 2–4)
GLUCOSE BLD STRIP.AUTO-MCNC: 94 MG/DL (ref 74–106)
GLUCOSE SERPL-MCNC: 93 MG/DL (ref 65–100)
GLUCOSE UR STRIP.AUTO-MCNC: NEGATIVE MG/DL
HCO3 BLDA-SCNC: 14 MMOL/L
HCT VFR BLD AUTO: 34.1 % (ref 35–47)
HGB BLD-MCNC: 11.7 G/DL (ref 11.5–16)
HGB UR QL STRIP: ABNORMAL
IMM GRANULOCYTES # BLD AUTO: 0 K/UL (ref 0–0.04)
IMM GRANULOCYTES NFR BLD AUTO: 0 % (ref 0–0.5)
KETONES UR QL STRIP.AUTO: ABNORMAL MG/DL
LACTATE BLD-SCNC: 1.22 MMOL/L (ref 0.4–2)
LEUKOCYTE ESTERASE UR QL STRIP.AUTO: ABNORMAL
LYMPHOCYTES # BLD: 2.1 K/UL (ref 0.8–3.5)
LYMPHOCYTES NFR BLD: 8 % (ref 12–49)
MAGNESIUM SERPL-MCNC: 2.8 MG/DL (ref 1.6–2.4)
MCH RBC QN AUTO: 31.4 PG (ref 26–34)
MCHC RBC AUTO-ENTMCNC: 34.3 G/DL (ref 30–36.5)
MCV RBC AUTO: 91.4 FL (ref 80–99)
MONOCYTES # BLD: 0.5 K/UL (ref 0–1)
MONOCYTES NFR BLD: 2 % (ref 5–13)
NEUTS BAND NFR BLD MANUAL: 1 %
NEUTS SEG # BLD: 24.2 K/UL (ref 1.8–8)
NEUTS SEG NFR BLD: 89 % (ref 32–75)
NITRITE UR QL STRIP.AUTO: POSITIVE
NRBC # BLD: 0 K/UL (ref 0–0.01)
NRBC BLD-RTO: 0 PER 100 WBC
NT PRO BNP: 2413 PG/ML
PCO2 BLDV: 31.1 MMHG (ref 41–51)
PH BLDV: 7.25 (ref 7.32–7.42)
PH UR STRIP: 5 (ref 5–8)
PHOSPHATE SERPL-MCNC: 9.4 MG/DL (ref 2.6–4.7)
PLATELET # BLD AUTO: 431 K/UL (ref 150–400)
PMV BLD AUTO: 10.1 FL (ref 8.9–12.9)
PO2 BLDV: 27 MMHG (ref 25–40)
POTASSIUM BLD-SCNC: 3.5 MMOL/L (ref 3.5–5.5)
POTASSIUM SERPL-SCNC: 3.5 MMOL/L (ref 3.5–5.1)
PROT SERPL-MCNC: 6.8 G/DL (ref 6.4–8.2)
PROT UR STRIP-MCNC: 300 MG/DL
RBC # BLD AUTO: 3.73 M/UL (ref 3.8–5.2)
RBC #/AREA URNS HPF: ABNORMAL /HPF (ref 0–5)
RBC MORPH BLD: ABNORMAL
SAO2 % BLD: 41 %
SERVICE CMNT-IMP: ABNORMAL
SODIUM BLD-SCNC: 121 MMOL/L (ref 136–145)
SODIUM SERPL-SCNC: 126 MMOL/L (ref 136–145)
SP GR UR REFRACTOMETRY: 1.02
SPECIMEN HOLD: NORMAL
SPECIMEN HOLD: NORMAL
SPECIMEN SITE: ABNORMAL
TROPONIN I SERPL HS-MCNC: 11 NG/L (ref 0–51)
URINE CULTURE IF INDICATED: ABNORMAL
UROBILINOGEN UR QL STRIP.AUTO: 1 EU/DL (ref 0.2–1)
WBC # BLD AUTO: 26.8 K/UL (ref 3.6–11)
WBC URNS QL MICRO: >100 /HPF (ref 0–4)

## 2023-06-14 PROCEDURE — 99285 EMERGENCY DEPT VISIT HI MDM: CPT

## 2023-06-14 PROCEDURE — 71045 X-RAY EXAM CHEST 1 VIEW: CPT

## 2023-06-14 PROCEDURE — P9045 ALBUMIN (HUMAN), 5%, 250 ML: HCPCS | Performed by: NURSE PRACTITIONER

## 2023-06-14 PROCEDURE — 85652 RBC SED RATE AUTOMATED: CPT

## 2023-06-14 PROCEDURE — 96361 HYDRATE IV INFUSION ADD-ON: CPT

## 2023-06-14 PROCEDURE — 36415 COLL VENOUS BLD VENIPUNCTURE: CPT

## 2023-06-14 PROCEDURE — 96367 TX/PROPH/DG ADDL SEQ IV INF: CPT

## 2023-06-14 PROCEDURE — 87040 BLOOD CULTURE FOR BACTERIA: CPT

## 2023-06-14 PROCEDURE — 6360000002 HC RX W HCPCS: Performed by: EMERGENCY MEDICINE

## 2023-06-14 PROCEDURE — 84295 ASSAY OF SERUM SODIUM: CPT

## 2023-06-14 PROCEDURE — 82947 ASSAY GLUCOSE BLOOD QUANT: CPT

## 2023-06-14 PROCEDURE — C9113 INJ PANTOPRAZOLE SODIUM, VIA: HCPCS | Performed by: NURSE PRACTITIONER

## 2023-06-14 PROCEDURE — 81001 URINALYSIS AUTO W/SCOPE: CPT

## 2023-06-14 PROCEDURE — 87077 CULTURE AEROBIC IDENTIFY: CPT

## 2023-06-14 PROCEDURE — 87086 URINE CULTURE/COLONY COUNT: CPT

## 2023-06-14 PROCEDURE — 85025 COMPLETE CBC W/AUTO DIFF WBC: CPT

## 2023-06-14 PROCEDURE — 2580000003 HC RX 258: Performed by: NURSE PRACTITIONER

## 2023-06-14 PROCEDURE — 6360000002 HC RX W HCPCS: Performed by: NURSE PRACTITIONER

## 2023-06-14 PROCEDURE — 84132 ASSAY OF SERUM POTASSIUM: CPT

## 2023-06-14 PROCEDURE — P9047 ALBUMIN (HUMAN), 25%, 50ML: HCPCS | Performed by: NURSE PRACTITIONER

## 2023-06-14 PROCEDURE — 02HV33Z INSERTION OF INFUSION DEVICE INTO SUPERIOR VENA CAVA, PERCUTANEOUS APPROACH: ICD-10-PCS | Performed by: INTERNAL MEDICINE

## 2023-06-14 PROCEDURE — 6370000000 HC RX 637 (ALT 250 FOR IP): Performed by: INTERNAL MEDICINE

## 2023-06-14 PROCEDURE — 2580000003 HC RX 258: Performed by: EMERGENCY MEDICINE

## 2023-06-14 PROCEDURE — 84484 ASSAY OF TROPONIN QUANT: CPT

## 2023-06-14 PROCEDURE — 84100 ASSAY OF PHOSPHORUS: CPT

## 2023-06-14 PROCEDURE — 83735 ASSAY OF MAGNESIUM: CPT

## 2023-06-14 PROCEDURE — 82803 BLOOD GASES ANY COMBINATION: CPT

## 2023-06-14 PROCEDURE — 2500000003 HC RX 250 WO HCPCS: Performed by: NURSE PRACTITIONER

## 2023-06-14 PROCEDURE — 80053 COMPREHEN METABOLIC PANEL: CPT

## 2023-06-14 PROCEDURE — 96365 THER/PROPH/DIAG IV INF INIT: CPT

## 2023-06-14 PROCEDURE — 93005 ELECTROCARDIOGRAM TRACING: CPT | Performed by: INTERNAL MEDICINE

## 2023-06-14 PROCEDURE — 83880 ASSAY OF NATRIURETIC PEPTIDE: CPT

## 2023-06-14 PROCEDURE — 86140 C-REACTIVE PROTEIN: CPT

## 2023-06-14 PROCEDURE — 87186 SC STD MICRODIL/AGAR DIL: CPT

## 2023-06-14 PROCEDURE — 82330 ASSAY OF CALCIUM: CPT

## 2023-06-14 PROCEDURE — 2000000000 HC ICU R&B

## 2023-06-14 PROCEDURE — A4216 STERILE WATER/SALINE, 10 ML: HCPCS | Performed by: NURSE PRACTITIONER

## 2023-06-14 RX ORDER — NOREPINEPHRINE BITARTRATE 0.06 MG/ML
1-100 INJECTION, SOLUTION INTRAVENOUS CONTINUOUS
Status: DISCONTINUED | OUTPATIENT
Start: 2023-06-14 | End: 2023-06-18

## 2023-06-14 RX ORDER — ONDANSETRON 2 MG/ML
4 INJECTION INTRAMUSCULAR; INTRAVENOUS EVERY 6 HOURS PRN
Status: DISCONTINUED | OUTPATIENT
Start: 2023-06-14 | End: 2023-06-22 | Stop reason: HOSPADM

## 2023-06-14 RX ORDER — SODIUM CHLORIDE 0.9 % (FLUSH) 0.9 %
5-40 SYRINGE (ML) INJECTION EVERY 12 HOURS SCHEDULED
Status: DISCONTINUED | OUTPATIENT
Start: 2023-06-14 | End: 2023-06-22 | Stop reason: HOSPADM

## 2023-06-14 RX ORDER — ALBUMIN (HUMAN) 12.5 G/50ML
25 SOLUTION INTRAVENOUS EVERY 6 HOURS
Status: DISCONTINUED | OUTPATIENT
Start: 2023-06-15 | End: 2023-06-15

## 2023-06-14 RX ORDER — MAGNESIUM SULFATE IN WATER 40 MG/ML
2000 INJECTION, SOLUTION INTRAVENOUS PRN
Status: DISCONTINUED | OUTPATIENT
Start: 2023-06-14 | End: 2023-06-15

## 2023-06-14 RX ORDER — ONDANSETRON 4 MG/1
4 TABLET, ORALLY DISINTEGRATING ORAL EVERY 8 HOURS PRN
Status: DISCONTINUED | OUTPATIENT
Start: 2023-06-14 | End: 2023-06-22 | Stop reason: HOSPADM

## 2023-06-14 RX ORDER — SODIUM CHLORIDE 0.9 % (FLUSH) 0.9 %
5-40 SYRINGE (ML) INJECTION PRN
Status: DISCONTINUED | OUTPATIENT
Start: 2023-06-14 | End: 2023-06-22 | Stop reason: HOSPADM

## 2023-06-14 RX ORDER — SODIUM CHLORIDE 9 MG/ML
INJECTION, SOLUTION INTRAVENOUS PRN
Status: DISCONTINUED | OUTPATIENT
Start: 2023-06-14 | End: 2023-06-22 | Stop reason: HOSPADM

## 2023-06-14 RX ORDER — POTASSIUM CHLORIDE 7.45 MG/ML
10 INJECTION INTRAVENOUS PRN
Status: DISCONTINUED | OUTPATIENT
Start: 2023-06-14 | End: 2023-06-15

## 2023-06-14 RX ORDER — ALBUMIN, HUMAN INJ 5% 5 %
12.5 SOLUTION INTRAVENOUS ONCE
Status: COMPLETED | OUTPATIENT
Start: 2023-06-14 | End: 2023-06-14

## 2023-06-14 RX ORDER — ALBUMIN, HUMAN INJ 5% 5 %
25 SOLUTION INTRAVENOUS ONCE
Status: COMPLETED | OUTPATIENT
Start: 2023-06-14 | End: 2023-06-14

## 2023-06-14 RX ORDER — 0.9 % SODIUM CHLORIDE 0.9 %
1000 INTRAVENOUS SOLUTION INTRAVENOUS ONCE
Status: COMPLETED | OUTPATIENT
Start: 2023-06-14 | End: 2023-06-14

## 2023-06-14 RX ORDER — IPRATROPIUM BROMIDE AND ALBUTEROL SULFATE 2.5; .5 MG/3ML; MG/3ML
1 SOLUTION RESPIRATORY (INHALATION) EVERY 4 HOURS PRN
Status: DISCONTINUED | OUTPATIENT
Start: 2023-06-14 | End: 2023-06-22 | Stop reason: HOSPADM

## 2023-06-14 RX ORDER — POTASSIUM CHLORIDE 29.8 MG/ML
20 INJECTION INTRAVENOUS PRN
Status: DISCONTINUED | OUTPATIENT
Start: 2023-06-14 | End: 2023-06-15

## 2023-06-14 RX ORDER — 0.9 % SODIUM CHLORIDE 0.9 %
500 INTRAVENOUS SOLUTION INTRAVENOUS ONCE
Status: COMPLETED | OUTPATIENT
Start: 2023-06-14 | End: 2023-06-14

## 2023-06-14 RX ORDER — HEPARIN SODIUM 5000 [USP'U]/ML
5000 INJECTION, SOLUTION INTRAVENOUS; SUBCUTANEOUS EVERY 8 HOURS SCHEDULED
Status: DISCONTINUED | OUTPATIENT
Start: 2023-06-14 | End: 2023-06-15 | Stop reason: ALTCHOICE

## 2023-06-14 RX ORDER — POLYETHYLENE GLYCOL 3350 17 G/17G
17 POWDER, FOR SOLUTION ORAL DAILY PRN
Status: DISCONTINUED | OUTPATIENT
Start: 2023-06-14 | End: 2023-06-22 | Stop reason: HOSPADM

## 2023-06-14 RX ORDER — ACETAMINOPHEN 325 MG/1
650 TABLET ORAL EVERY 6 HOURS PRN
Status: DISCONTINUED | OUTPATIENT
Start: 2023-06-14 | End: 2023-06-22 | Stop reason: HOSPADM

## 2023-06-14 RX ORDER — ACETAMINOPHEN 650 MG/1
650 SUPPOSITORY RECTAL EVERY 6 HOURS PRN
Status: DISCONTINUED | OUTPATIENT
Start: 2023-06-14 | End: 2023-06-22 | Stop reason: HOSPADM

## 2023-06-14 RX ORDER — 0.9 % SODIUM CHLORIDE 0.9 %
1000 INTRAVENOUS SOLUTION INTRAVENOUS ONCE
Status: DISCONTINUED | OUTPATIENT
Start: 2023-06-14 | End: 2023-06-15

## 2023-06-14 RX ORDER — CASTOR OIL AND BALSAM, PERU 788; 87 MG/G; MG/G
OINTMENT TOPICAL 2 TIMES DAILY
Status: DISCONTINUED | OUTPATIENT
Start: 2023-06-14 | End: 2023-06-22 | Stop reason: HOSPADM

## 2023-06-14 RX ADMIN — CEFEPIME 2000 MG: 2 INJECTION, POWDER, FOR SOLUTION INTRAVENOUS at 16:41

## 2023-06-14 RX ADMIN — SODIUM BICARBONATE 50 MEQ: 84 INJECTION, SOLUTION INTRAVENOUS at 20:55

## 2023-06-14 RX ADMIN — ALBUMIN (HUMAN) 12.5 G: 12.5 INJECTION, SOLUTION INTRAVENOUS at 22:36

## 2023-06-14 RX ADMIN — ALBUMIN (HUMAN) 25 G: 12.5 INJECTION, SOLUTION INTRAVENOUS at 20:55

## 2023-06-14 RX ADMIN — SODIUM CHLORIDE 500 ML: 9 INJECTION, SOLUTION INTRAVENOUS at 18:13

## 2023-06-14 RX ADMIN — ALBUMIN (HUMAN) 25 G: 0.25 INJECTION, SOLUTION INTRAVENOUS at 23:30

## 2023-06-14 RX ADMIN — Medication: at 22:49

## 2023-06-14 RX ADMIN — VANCOMYCIN HYDROCHLORIDE 1500 MG: 10 INJECTION, POWDER, LYOPHILIZED, FOR SOLUTION INTRAVENOUS at 18:13

## 2023-06-14 RX ADMIN — SODIUM CHLORIDE, PRESERVATIVE FREE 10 ML: 5 INJECTION INTRAVENOUS at 21:46

## 2023-06-14 RX ADMIN — SODIUM CHLORIDE 500 ML: 9 INJECTION, SOLUTION INTRAVENOUS at 16:40

## 2023-06-14 RX ADMIN — SODIUM CHLORIDE, PRESERVATIVE FREE 40 MG: 5 INJECTION INTRAVENOUS at 20:55

## 2023-06-14 RX ADMIN — SODIUM CHLORIDE 1000 ML: 9 INJECTION, SOLUTION INTRAVENOUS at 18:38

## 2023-06-14 RX ADMIN — SODIUM BICARBONATE: 84 INJECTION, SOLUTION INTRAVENOUS at 21:45

## 2023-06-14 RX ADMIN — Medication 5 MCG/MIN: at 22:51

## 2023-06-14 RX ADMIN — HEPARIN SODIUM 5000 UNITS: 5000 INJECTION INTRAVENOUS; SUBCUTANEOUS at 22:43

## 2023-06-14 RX ADMIN — PHENYLEPHRINE HYDROCHLORIDE 30 MCG/MIN: 10 INJECTION INTRAVENOUS at 19:37

## 2023-06-14 ASSESSMENT — LIFESTYLE VARIABLES
HOW MANY STANDARD DRINKS CONTAINING ALCOHOL DO YOU HAVE ON A TYPICAL DAY: PATIENT DOES NOT DRINK
HOW OFTEN DO YOU HAVE A DRINK CONTAINING ALCOHOL: NEVER

## 2023-06-14 ASSESSMENT — ENCOUNTER SYMPTOMS
ABDOMINAL PAIN: 0
NAUSEA: 1
DIARRHEA: 0
SHORTNESS OF BREATH: 0
VOMITING: 1

## 2023-06-15 ENCOUNTER — APPOINTMENT (OUTPATIENT)
Facility: HOSPITAL | Age: 73
DRG: 871 | End: 2023-06-15
Payer: MEDICARE

## 2023-06-15 PROBLEM — R79.89 INCREASE IN CREATININE: Status: ACTIVE | Noted: 2023-06-15

## 2023-06-15 PROBLEM — Z51.5 PALLIATIVE CARE ENCOUNTER: Status: ACTIVE | Noted: 2023-06-15

## 2023-06-15 PROBLEM — I95.9 ARTERIAL HYPOTENSION: Status: ACTIVE | Noted: 2023-06-15

## 2023-06-15 LAB
ANION GAP SERPL CALC-SCNC: 21 MMOL/L (ref 5–15)
ANION GAP SERPL CALC-SCNC: 22 MMOL/L (ref 5–15)
ANION GAP SERPL CALC-SCNC: 24 MMOL/L (ref 5–15)
ANION GAP SERPL CALC-SCNC: 25 MMOL/L (ref 5–15)
ANION GAP SERPL CALC-SCNC: 25 MMOL/L (ref 5–15)
APTT PPP: 122 SEC (ref 22.1–31)
APTT PPP: 33.5 SEC (ref 22.1–31)
APTT PPP: 34.3 SEC (ref 22.1–31)
BASOPHILS # BLD: 0 K/UL (ref 0–0.1)
BASOPHILS NFR BLD: 0 % (ref 0–1)
BUN SERPL-MCNC: 115 MG/DL (ref 6–20)
BUN SERPL-MCNC: 116 MG/DL (ref 6–20)
BUN SERPL-MCNC: 118 MG/DL (ref 6–20)
BUN SERPL-MCNC: 118 MG/DL (ref 6–20)
BUN SERPL-MCNC: 121 MG/DL (ref 6–20)
BUN/CREAT SERPL: 29 (ref 12–20)
BUN/CREAT SERPL: 32 (ref 12–20)
BUN/CREAT SERPL: 32 (ref 12–20)
BUN/CREAT SERPL: 34 (ref 12–20)
BUN/CREAT SERPL: 35 (ref 12–20)
CALCIUM SERPL-MCNC: 7.5 MG/DL (ref 8.5–10.1)
CALCIUM SERPL-MCNC: 7.5 MG/DL (ref 8.5–10.1)
CALCIUM SERPL-MCNC: 7.7 MG/DL (ref 8.5–10.1)
CALCIUM SERPL-MCNC: 8 MG/DL (ref 8.5–10.1)
CALCIUM SERPL-MCNC: 8.2 MG/DL (ref 8.5–10.1)
CHLORIDE SERPL-SCNC: 96 MMOL/L (ref 97–108)
CHLORIDE SERPL-SCNC: 97 MMOL/L (ref 97–108)
CHLORIDE SERPL-SCNC: 98 MMOL/L (ref 97–108)
CO2 SERPL-SCNC: 12 MMOL/L (ref 21–32)
CO2 SERPL-SCNC: 14 MMOL/L (ref 21–32)
CO2 SERPL-SCNC: 15 MMOL/L (ref 21–32)
CO2 SERPL-SCNC: 16 MMOL/L (ref 21–32)
CO2 SERPL-SCNC: 18 MMOL/L (ref 21–32)
COMMENT:: NORMAL
CREAT SERPL-MCNC: 3.32 MG/DL (ref 0.55–1.02)
CREAT SERPL-MCNC: 3.39 MG/DL (ref 0.55–1.02)
CREAT SERPL-MCNC: 3.64 MG/DL (ref 0.55–1.02)
CREAT SERPL-MCNC: 3.73 MG/DL (ref 0.55–1.02)
CREAT SERPL-MCNC: 4.15 MG/DL (ref 0.55–1.02)
DIFFERENTIAL METHOD BLD: ABNORMAL
EKG ATRIAL RATE: 62 BPM
EKG DIAGNOSIS: NORMAL
EKG Q-T INTERVAL: 414 MS
EKG QRS DURATION: 84 MS
EKG QTC CALCULATION (BAZETT): 406 MS
EKG R AXIS: -8 DEGREES
EKG T AXIS: -66 DEGREES
EKG VENTRICULAR RATE: 58 BPM
EOSINOPHIL # BLD: 0 K/UL (ref 0–0.4)
EOSINOPHIL NFR BLD: 0 % (ref 0–7)
ERYTHROCYTE [DISTWIDTH] IN BLOOD BY AUTOMATED COUNT: 13.5 % (ref 11.5–14.5)
GLUCOSE SERPL-MCNC: 106 MG/DL (ref 65–100)
GLUCOSE SERPL-MCNC: 113 MG/DL (ref 65–100)
GLUCOSE SERPL-MCNC: 115 MG/DL (ref 65–100)
GLUCOSE SERPL-MCNC: 95 MG/DL (ref 65–100)
GLUCOSE SERPL-MCNC: 99 MG/DL (ref 65–100)
HCT VFR BLD AUTO: 28.5 % (ref 35–47)
HGB BLD-MCNC: 9.9 G/DL (ref 11.5–16)
IMM GRANULOCYTES # BLD AUTO: 0 K/UL (ref 0–0.04)
IMM GRANULOCYTES NFR BLD AUTO: 0 % (ref 0–0.5)
INR PPP: 1 (ref 0.9–1.1)
INR PPP: 1.1 (ref 0.9–1.1)
LACTATE SERPL-SCNC: 0.8 MMOL/L (ref 0.4–2)
LYMPHOCYTES # BLD: 1.6 K/UL (ref 0.8–3.5)
LYMPHOCYTES NFR BLD: 6 % (ref 12–49)
MAGNESIUM SERPL-MCNC: 2.5 MG/DL (ref 1.6–2.4)
MAGNESIUM SERPL-MCNC: 2.6 MG/DL (ref 1.6–2.4)
MCH RBC QN AUTO: 31.6 PG (ref 26–34)
MCHC RBC AUTO-ENTMCNC: 34.7 G/DL (ref 30–36.5)
MCV RBC AUTO: 91.1 FL (ref 80–99)
MONOCYTES # BLD: 1.1 K/UL (ref 0–1)
MONOCYTES NFR BLD: 4 % (ref 5–13)
NEUTS BAND NFR BLD MANUAL: 1 %
NEUTS SEG # BLD: 24.4 K/UL (ref 1.8–8)
NEUTS SEG NFR BLD: 89 % (ref 32–75)
NRBC # BLD: 0 K/UL (ref 0–0.01)
NRBC BLD-RTO: 0 PER 100 WBC
PHOSPHATE SERPL-MCNC: 7.9 MG/DL (ref 2.6–4.7)
PHOSPHATE SERPL-MCNC: 8.6 MG/DL (ref 2.6–4.7)
PLATELET # BLD AUTO: 364 K/UL (ref 150–400)
PMV BLD AUTO: 10 FL (ref 8.9–12.9)
POTASSIUM SERPL-SCNC: 2.8 MMOL/L (ref 3.5–5.1)
POTASSIUM SERPL-SCNC: 3.2 MMOL/L (ref 3.5–5.1)
POTASSIUM SERPL-SCNC: 3.4 MMOL/L (ref 3.5–5.1)
POTASSIUM SERPL-SCNC: 3.9 MMOL/L (ref 3.5–5.1)
POTASSIUM SERPL-SCNC: 4.1 MMOL/L (ref 3.5–5.1)
PROCALCITONIN SERPL-MCNC: 0.05 NG/ML
PROTHROMBIN TIME: 10.9 SEC (ref 9–11.1)
PROTHROMBIN TIME: 11.1 SEC (ref 9–11.1)
RBC # BLD AUTO: 3.13 M/UL (ref 3.8–5.2)
RBC MORPH BLD: ABNORMAL
SODIUM SERPL-SCNC: 132 MMOL/L (ref 136–145)
SODIUM SERPL-SCNC: 135 MMOL/L (ref 136–145)
SODIUM SERPL-SCNC: 135 MMOL/L (ref 136–145)
SODIUM SERPL-SCNC: 136 MMOL/L (ref 136–145)
SODIUM SERPL-SCNC: 137 MMOL/L (ref 136–145)
SPECIMEN HOLD: NORMAL
THERAPEUTIC RANGE: ABNORMAL SECS (ref 58–77)
UFH PPP CHRO-ACNC: 0.2 IU/ML
UFH PPP CHRO-ACNC: 0.87 IU/ML
WBC # BLD AUTO: 27.1 K/UL (ref 3.6–11)

## 2023-06-15 PROCEDURE — 84100 ASSAY OF PHOSPHORUS: CPT

## 2023-06-15 PROCEDURE — 99223 1ST HOSP IP/OBS HIGH 75: CPT | Performed by: INTERNAL MEDICINE

## 2023-06-15 PROCEDURE — 85730 THROMBOPLASTIN TIME PARTIAL: CPT

## 2023-06-15 PROCEDURE — 85610 PROTHROMBIN TIME: CPT

## 2023-06-15 PROCEDURE — 6370000000 HC RX 637 (ALT 250 FOR IP): Performed by: HOSPITALIST

## 2023-06-15 PROCEDURE — 6360000002 HC RX W HCPCS: Performed by: INTERNAL MEDICINE

## 2023-06-15 PROCEDURE — 6370000000 HC RX 637 (ALT 250 FOR IP): Performed by: INTERNAL MEDICINE

## 2023-06-15 PROCEDURE — 36415 COLL VENOUS BLD VENIPUNCTURE: CPT

## 2023-06-15 PROCEDURE — 83605 ASSAY OF LACTIC ACID: CPT

## 2023-06-15 PROCEDURE — 85025 COMPLETE CBC W/AUTO DIFF WBC: CPT

## 2023-06-15 PROCEDURE — 2580000003 HC RX 258: Performed by: INTERNAL MEDICINE

## 2023-06-15 PROCEDURE — P9047 ALBUMIN (HUMAN), 25%, 50ML: HCPCS | Performed by: NURSE PRACTITIONER

## 2023-06-15 PROCEDURE — C9113 INJ PANTOPRAZOLE SODIUM, VIA: HCPCS | Performed by: NURSE PRACTITIONER

## 2023-06-15 PROCEDURE — 80048 BASIC METABOLIC PNL TOTAL CA: CPT

## 2023-06-15 PROCEDURE — 2500000003 HC RX 250 WO HCPCS: Performed by: NURSE PRACTITIONER

## 2023-06-15 PROCEDURE — 6360000002 HC RX W HCPCS: Performed by: NURSE PRACTITIONER

## 2023-06-15 PROCEDURE — 2580000003 HC RX 258: Performed by: NURSE PRACTITIONER

## 2023-06-15 PROCEDURE — 2000000000 HC ICU R&B

## 2023-06-15 PROCEDURE — 83735 ASSAY OF MAGNESIUM: CPT

## 2023-06-15 PROCEDURE — 85520 HEPARIN ASSAY: CPT

## 2023-06-15 PROCEDURE — 84145 PROCALCITONIN (PCT): CPT

## 2023-06-15 RX ORDER — HEPARIN SODIUM 10000 [USP'U]/100ML
5-30 INJECTION, SOLUTION INTRAVENOUS CONTINUOUS
Status: DISCONTINUED | OUTPATIENT
Start: 2023-06-15 | End: 2023-06-18

## 2023-06-15 RX ORDER — SOTALOL HYDROCHLORIDE 80 MG/1
80 TABLET ORAL 2 TIMES DAILY
Status: CANCELLED | OUTPATIENT
Start: 2023-06-15

## 2023-06-15 RX ORDER — HEPARIN SODIUM 1000 [USP'U]/ML
4000 INJECTION, SOLUTION INTRAVENOUS; SUBCUTANEOUS PRN
Status: DISCONTINUED | OUTPATIENT
Start: 2023-06-15 | End: 2023-06-18

## 2023-06-15 RX ORDER — POTASSIUM CHLORIDE 29.8 MG/ML
20 INJECTION INTRAVENOUS
Status: COMPLETED | OUTPATIENT
Start: 2023-06-15 | End: 2023-06-15

## 2023-06-15 RX ORDER — HEPARIN SODIUM 1000 [USP'U]/ML
2000 INJECTION, SOLUTION INTRAVENOUS; SUBCUTANEOUS PRN
Status: DISCONTINUED | OUTPATIENT
Start: 2023-06-15 | End: 2023-06-18

## 2023-06-15 RX ORDER — POTASSIUM CHLORIDE 29.8 MG/ML
20 INJECTION INTRAVENOUS
Status: DISCONTINUED | OUTPATIENT
Start: 2023-06-15 | End: 2023-06-15

## 2023-06-15 RX ADMIN — POTASSIUM BICARBONATE 40 MEQ: 782 TABLET, EFFERVESCENT ORAL at 15:57

## 2023-06-15 RX ADMIN — Medication: at 15:56

## 2023-06-15 RX ADMIN — Medication 1 AMPULE: at 11:18

## 2023-06-15 RX ADMIN — Medication: at 20:44

## 2023-06-15 RX ADMIN — Medication: at 11:18

## 2023-06-15 RX ADMIN — SODIUM BICARBONATE: 84 INJECTION, SOLUTION INTRAVENOUS at 04:52

## 2023-06-15 RX ADMIN — AMIODARONE HYDROCHLORIDE 0.5 MG/MIN: 50 INJECTION, SOLUTION INTRAVENOUS at 16:24

## 2023-06-15 RX ADMIN — POTASSIUM CHLORIDE 20 MEQ: 29.8 INJECTION, SOLUTION INTRAVENOUS at 07:56

## 2023-06-15 RX ADMIN — POTASSIUM CHLORIDE 20 MEQ: 29.8 INJECTION, SOLUTION INTRAVENOUS at 05:22

## 2023-06-15 RX ADMIN — Medication: at 20:43

## 2023-06-15 RX ADMIN — POTASSIUM CHLORIDE 20 MEQ: 29.8 INJECTION, SOLUTION INTRAVENOUS at 06:34

## 2023-06-15 RX ADMIN — SODIUM CHLORIDE, PRESERVATIVE FREE 20 ML: 5 INJECTION INTRAVENOUS at 20:44

## 2023-06-15 RX ADMIN — SODIUM CHLORIDE, PRESERVATIVE FREE 40 MG: 5 INJECTION INTRAVENOUS at 07:49

## 2023-06-15 RX ADMIN — POTASSIUM BICARBONATE 40 MEQ: 782 TABLET, EFFERVESCENT ORAL at 19:37

## 2023-06-15 RX ADMIN — Medication 1 AMPULE: at 20:42

## 2023-06-15 RX ADMIN — HEPARIN SODIUM 5000 UNITS: 5000 INJECTION INTRAVENOUS; SUBCUTANEOUS at 05:27

## 2023-06-15 RX ADMIN — SODIUM BICARBONATE 50 MEQ: 84 INJECTION, SOLUTION INTRAVENOUS at 05:18

## 2023-06-15 RX ADMIN — POTASSIUM BICARBONATE 40 MEQ: 782 TABLET, EFFERVESCENT ORAL at 13:12

## 2023-06-15 RX ADMIN — CEFEPIME 1000 MG: 1 INJECTION, POWDER, FOR SOLUTION INTRAMUSCULAR; INTRAVENOUS at 15:48

## 2023-06-15 RX ADMIN — SODIUM CHLORIDE, PRESERVATIVE FREE 10 ML: 5 INJECTION INTRAVENOUS at 07:50

## 2023-06-15 RX ADMIN — HEPARIN SODIUM 11 UNITS/KG/HR: 10000 INJECTION, SOLUTION INTRAVENOUS at 11:10

## 2023-06-15 RX ADMIN — ALBUMIN (HUMAN) 25 G: 0.25 INJECTION, SOLUTION INTRAVENOUS at 05:26

## 2023-06-15 RX ADMIN — SODIUM BICARBONATE: 84 INJECTION, SOLUTION INTRAVENOUS at 18:36

## 2023-06-15 RX ADMIN — ALBUMIN (HUMAN) 25 G: 0.25 INJECTION, SOLUTION INTRAVENOUS at 11:19

## 2023-06-16 LAB
ANION GAP SERPL CALC-SCNC: 16 MMOL/L (ref 5–15)
ANION GAP SERPL CALC-SCNC: 20 MMOL/L (ref 5–15)
ANION GAP SERPL CALC-SCNC: 22 MMOL/L (ref 5–15)
APTT PPP: 84.9 SEC (ref 22.1–31)
BACTERIA SPEC CULT: NORMAL
BACTERIA SPEC CULT: NORMAL
BASOPHILS # BLD: 0.1 K/UL (ref 0–0.1)
BASOPHILS NFR BLD: 0 % (ref 0–1)
BUN SERPL-MCNC: 106 MG/DL (ref 6–20)
BUN SERPL-MCNC: 112 MG/DL (ref 6–20)
BUN SERPL-MCNC: 114 MG/DL (ref 6–20)
BUN/CREAT SERPL: 36 (ref 12–20)
BUN/CREAT SERPL: 36 (ref 12–20)
BUN/CREAT SERPL: 40 (ref 12–20)
CALCIUM SERPL-MCNC: 10.1 MG/DL (ref 8.5–10.1)
CALCIUM SERPL-MCNC: 8.4 MG/DL (ref 8.5–10.1)
CALCIUM SERPL-MCNC: 9 MG/DL (ref 8.5–10.1)
CHLORIDE SERPL-SCNC: 97 MMOL/L (ref 97–108)
CHLORIDE SERPL-SCNC: 98 MMOL/L (ref 97–108)
CHLORIDE SERPL-SCNC: 99 MMOL/L (ref 97–108)
CO2 SERPL-SCNC: 20 MMOL/L (ref 21–32)
CO2 SERPL-SCNC: 22 MMOL/L (ref 21–32)
CO2 SERPL-SCNC: 24 MMOL/L (ref 21–32)
CREAT SERPL-MCNC: 2.65 MG/DL (ref 0.55–1.02)
CREAT SERPL-MCNC: 3.14 MG/DL (ref 0.55–1.02)
CREAT SERPL-MCNC: 3.19 MG/DL (ref 0.55–1.02)
DIFFERENTIAL METHOD BLD: ABNORMAL
EOSINOPHIL # BLD: 0.2 K/UL (ref 0–0.4)
EOSINOPHIL NFR BLD: 1 % (ref 0–7)
ERYTHROCYTE [DISTWIDTH] IN BLOOD BY AUTOMATED COUNT: 13.7 % (ref 11.5–14.5)
ERYTHROCYTE [DISTWIDTH] IN BLOOD BY AUTOMATED COUNT: 14.2 % (ref 11.5–14.5)
GLUCOSE SERPL-MCNC: 119 MG/DL (ref 65–100)
GLUCOSE SERPL-MCNC: 129 MG/DL (ref 65–100)
GLUCOSE SERPL-MCNC: 132 MG/DL (ref 65–100)
HCT VFR BLD AUTO: 25.2 % (ref 35–47)
HCT VFR BLD AUTO: 25.9 % (ref 35–47)
HGB BLD-MCNC: 8.5 G/DL (ref 11.5–16)
HGB BLD-MCNC: 8.9 G/DL (ref 11.5–16)
IMM GRANULOCYTES # BLD AUTO: 0.3 K/UL (ref 0–0.04)
IMM GRANULOCYTES NFR BLD AUTO: 2 % (ref 0–0.5)
LACTATE SERPL-SCNC: 0.9 MMOL/L (ref 0.4–2)
LYMPHOCYTES # BLD: 1.8 K/UL (ref 0.8–3.5)
LYMPHOCYTES NFR BLD: 10 % (ref 12–49)
MAGNESIUM SERPL-MCNC: 2.4 MG/DL (ref 1.6–2.4)
MCH RBC QN AUTO: 30.7 PG (ref 26–34)
MCH RBC QN AUTO: 31.7 PG (ref 26–34)
MCHC RBC AUTO-ENTMCNC: 33.7 G/DL (ref 30–36.5)
MCHC RBC AUTO-ENTMCNC: 34.4 G/DL (ref 30–36.5)
MCV RBC AUTO: 91 FL (ref 80–99)
MCV RBC AUTO: 92.2 FL (ref 80–99)
MONOCYTES # BLD: 1.5 K/UL (ref 0–1)
MONOCYTES NFR BLD: 8 % (ref 5–13)
NEUTS SEG # BLD: 14.8 K/UL (ref 1.8–8)
NEUTS SEG NFR BLD: 79 % (ref 32–75)
NRBC # BLD: 0 K/UL (ref 0–0.01)
NRBC # BLD: 0 K/UL (ref 0–0.01)
NRBC BLD-RTO: 0 PER 100 WBC
NRBC BLD-RTO: 0 PER 100 WBC
PHOSPHATE SERPL-MCNC: 7.5 MG/DL (ref 2.6–4.7)
PLATELET # BLD AUTO: 255 K/UL (ref 150–400)
PLATELET # BLD AUTO: 283 K/UL (ref 150–400)
PMV BLD AUTO: 10.2 FL (ref 8.9–12.9)
PMV BLD AUTO: 9.8 FL (ref 8.9–12.9)
POTASSIUM SERPL-SCNC: 3.5 MMOL/L (ref 3.5–5.1)
POTASSIUM SERPL-SCNC: 3.9 MMOL/L (ref 3.5–5.1)
POTASSIUM SERPL-SCNC: 3.9 MMOL/L (ref 3.5–5.1)
RBC # BLD AUTO: 2.77 M/UL (ref 3.8–5.2)
RBC # BLD AUTO: 2.81 M/UL (ref 3.8–5.2)
SERVICE CMNT-IMP: NORMAL
SODIUM SERPL-SCNC: 138 MMOL/L (ref 136–145)
SODIUM SERPL-SCNC: 139 MMOL/L (ref 136–145)
SODIUM SERPL-SCNC: 141 MMOL/L (ref 136–145)
THERAPEUTIC RANGE: ABNORMAL SECS (ref 58–77)
VANCOMYCIN SERPL-MCNC: 15.1 UG/ML
WBC # BLD AUTO: 17.8 K/UL (ref 3.6–11)
WBC # BLD AUTO: 18.7 K/UL (ref 3.6–11)

## 2023-06-16 PROCEDURE — 6360000002 HC RX W HCPCS: Performed by: NURSE PRACTITIONER

## 2023-06-16 PROCEDURE — 83735 ASSAY OF MAGNESIUM: CPT

## 2023-06-16 PROCEDURE — 2500000003 HC RX 250 WO HCPCS: Performed by: NURSE PRACTITIONER

## 2023-06-16 PROCEDURE — 84100 ASSAY OF PHOSPHORUS: CPT

## 2023-06-16 PROCEDURE — 2580000003 HC RX 258: Performed by: NURSE PRACTITIONER

## 2023-06-16 PROCEDURE — 80202 ASSAY OF VANCOMYCIN: CPT

## 2023-06-16 PROCEDURE — 2000000000 HC ICU R&B

## 2023-06-16 PROCEDURE — 80048 BASIC METABOLIC PNL TOTAL CA: CPT

## 2023-06-16 PROCEDURE — 85027 COMPLETE CBC AUTOMATED: CPT

## 2023-06-16 PROCEDURE — 6370000000 HC RX 637 (ALT 250 FOR IP): Performed by: INTERNAL MEDICINE

## 2023-06-16 PROCEDURE — 6360000002 HC RX W HCPCS: Performed by: INTERNAL MEDICINE

## 2023-06-16 PROCEDURE — 85025 COMPLETE CBC W/AUTO DIFF WBC: CPT

## 2023-06-16 PROCEDURE — 85730 THROMBOPLASTIN TIME PARTIAL: CPT

## 2023-06-16 PROCEDURE — 2580000003 HC RX 258: Performed by: INTERNAL MEDICINE

## 2023-06-16 PROCEDURE — 83605 ASSAY OF LACTIC ACID: CPT

## 2023-06-16 PROCEDURE — 36415 COLL VENOUS BLD VENIPUNCTURE: CPT

## 2023-06-16 RX ADMIN — SODIUM CHLORIDE, PRESERVATIVE FREE 10 ML: 5 INJECTION INTRAVENOUS at 21:00

## 2023-06-16 RX ADMIN — Medication 2 MCG/MIN: at 08:09

## 2023-06-16 RX ADMIN — CEFEPIME 1000 MG: 1 INJECTION, POWDER, FOR SOLUTION INTRAMUSCULAR; INTRAVENOUS at 18:15

## 2023-06-16 RX ADMIN — Medication: at 18:15

## 2023-06-16 RX ADMIN — Medication 1 AMPULE: at 21:00

## 2023-06-16 RX ADMIN — Medication 1 AMPULE: at 08:16

## 2023-06-16 RX ADMIN — Medication: at 21:00

## 2023-06-16 RX ADMIN — AMIODARONE HYDROCHLORIDE 0.5 MG/MIN: 50 INJECTION, SOLUTION INTRAVENOUS at 08:01

## 2023-06-16 RX ADMIN — SODIUM CHLORIDE, PRESERVATIVE FREE 10 ML: 5 INJECTION INTRAVENOUS at 10:26

## 2023-06-16 RX ADMIN — Medication: at 08:16

## 2023-06-16 RX ADMIN — Medication: at 10:26

## 2023-06-16 RX ADMIN — VANCOMYCIN HYDROCHLORIDE 750 MG: 750 INJECTION, POWDER, LYOPHILIZED, FOR SOLUTION INTRAVENOUS at 10:18

## 2023-06-16 RX ADMIN — SODIUM BICARBONATE: 84 INJECTION, SOLUTION INTRAVENOUS at 04:58

## 2023-06-17 ENCOUNTER — APPOINTMENT (OUTPATIENT)
Facility: HOSPITAL | Age: 73
DRG: 871 | End: 2023-06-17
Payer: MEDICARE

## 2023-06-17 LAB
ANION GAP SERPL CALC-SCNC: 11 MMOL/L (ref 5–15)
ANION GAP SERPL CALC-SCNC: 15 MMOL/L (ref 5–15)
BACTERIA SPEC CULT: ABNORMAL
BASOPHILS # BLD: 0.1 K/UL (ref 0–0.1)
BASOPHILS NFR BLD: 0 % (ref 0–1)
BUN SERPL-MCNC: 83 MG/DL (ref 6–20)
BUN SERPL-MCNC: 95 MG/DL (ref 6–20)
BUN/CREAT SERPL: 45 (ref 12–20)
BUN/CREAT SERPL: 57 (ref 12–20)
CALCIUM SERPL-MCNC: 10.5 MG/DL (ref 8.5–10.1)
CALCIUM SERPL-MCNC: 10.5 MG/DL (ref 8.5–10.1)
CC UR VC: ABNORMAL
CHLORIDE SERPL-SCNC: 102 MMOL/L (ref 97–108)
CHLORIDE SERPL-SCNC: 107 MMOL/L (ref 97–108)
CO2 SERPL-SCNC: 28 MMOL/L (ref 21–32)
CO2 SERPL-SCNC: 29 MMOL/L (ref 21–32)
CREAT SERPL-MCNC: 1.45 MG/DL (ref 0.55–1.02)
CREAT SERPL-MCNC: 2.12 MG/DL (ref 0.55–1.02)
DIFFERENTIAL METHOD BLD: ABNORMAL
EOSINOPHIL # BLD: 0.1 K/UL (ref 0–0.4)
EOSINOPHIL NFR BLD: 1 % (ref 0–7)
ERYTHROCYTE [DISTWIDTH] IN BLOOD BY AUTOMATED COUNT: 14.4 % (ref 11.5–14.5)
GLUCOSE SERPL-MCNC: 128 MG/DL (ref 65–100)
GLUCOSE SERPL-MCNC: 143 MG/DL (ref 65–100)
HCT VFR BLD AUTO: 25.8 % (ref 35–47)
HGB BLD-MCNC: 8.6 G/DL (ref 11.5–16)
IMM GRANULOCYTES # BLD AUTO: 0.2 K/UL (ref 0–0.04)
IMM GRANULOCYTES NFR BLD AUTO: 1 % (ref 0–0.5)
LACTATE SERPL-SCNC: 1 MMOL/L (ref 0.4–2)
LYMPHOCYTES # BLD: 1.5 K/UL (ref 0.8–3.5)
LYMPHOCYTES NFR BLD: 9 % (ref 12–49)
MAGNESIUM SERPL-MCNC: 2.2 MG/DL (ref 1.6–2.4)
MCH RBC QN AUTO: 30.9 PG (ref 26–34)
MCHC RBC AUTO-ENTMCNC: 33.3 G/DL (ref 30–36.5)
MCV RBC AUTO: 92.8 FL (ref 80–99)
MONOCYTES # BLD: 1.6 K/UL (ref 0–1)
MONOCYTES NFR BLD: 9 % (ref 5–13)
NEUTS SEG # BLD: 13.4 K/UL (ref 1.8–8)
NEUTS SEG NFR BLD: 80 % (ref 32–75)
NRBC # BLD: 0 K/UL (ref 0–0.01)
NRBC BLD-RTO: 0 PER 100 WBC
PHOSPHATE SERPL-MCNC: 6.4 MG/DL (ref 2.6–4.7)
PLATELET # BLD AUTO: 234 K/UL (ref 150–400)
PMV BLD AUTO: 10 FL (ref 8.9–12.9)
POTASSIUM SERPL-SCNC: 2.8 MMOL/L (ref 3.5–5.1)
POTASSIUM SERPL-SCNC: 3 MMOL/L (ref 3.5–5.1)
RBC # BLD AUTO: 2.78 M/UL (ref 3.8–5.2)
SERVICE CMNT-IMP: ABNORMAL
SODIUM SERPL-SCNC: 145 MMOL/L (ref 136–145)
SODIUM SERPL-SCNC: 147 MMOL/L (ref 136–145)
UFH PPP CHRO-ACNC: <0.1 IU/ML
WBC # BLD AUTO: 16.8 K/UL (ref 3.6–11)

## 2023-06-17 PROCEDURE — 85520 HEPARIN ASSAY: CPT

## 2023-06-17 PROCEDURE — 2000000000 HC ICU R&B

## 2023-06-17 PROCEDURE — 6370000000 HC RX 637 (ALT 250 FOR IP): Performed by: INTERNAL MEDICINE

## 2023-06-17 PROCEDURE — 84100 ASSAY OF PHOSPHORUS: CPT

## 2023-06-17 PROCEDURE — 97530 THERAPEUTIC ACTIVITIES: CPT | Performed by: OCCUPATIONAL THERAPIST

## 2023-06-17 PROCEDURE — 93970 EXTREMITY STUDY: CPT

## 2023-06-17 PROCEDURE — 97163 PT EVAL HIGH COMPLEX 45 MIN: CPT

## 2023-06-17 PROCEDURE — 97166 OT EVAL MOD COMPLEX 45 MIN: CPT | Performed by: OCCUPATIONAL THERAPIST

## 2023-06-17 PROCEDURE — 6360000002 HC RX W HCPCS: Performed by: INTERNAL MEDICINE

## 2023-06-17 PROCEDURE — 83735 ASSAY OF MAGNESIUM: CPT

## 2023-06-17 PROCEDURE — 80048 BASIC METABOLIC PNL TOTAL CA: CPT

## 2023-06-17 PROCEDURE — 2580000003 HC RX 258: Performed by: NURSE PRACTITIONER

## 2023-06-17 PROCEDURE — 83605 ASSAY OF LACTIC ACID: CPT

## 2023-06-17 PROCEDURE — 36415 COLL VENOUS BLD VENIPUNCTURE: CPT

## 2023-06-17 PROCEDURE — 85025 COMPLETE CBC W/AUTO DIFF WBC: CPT

## 2023-06-17 PROCEDURE — 97530 THERAPEUTIC ACTIVITIES: CPT

## 2023-06-17 PROCEDURE — 2580000003 HC RX 258: Performed by: INTERNAL MEDICINE

## 2023-06-17 RX ORDER — POTASSIUM CHLORIDE 29.8 MG/ML
20 INJECTION INTRAVENOUS
Status: COMPLETED | OUTPATIENT
Start: 2023-06-17 | End: 2023-06-17

## 2023-06-17 RX ORDER — MIDODRINE HYDROCHLORIDE 5 MG/1
5 TABLET ORAL
Status: DISCONTINUED | OUTPATIENT
Start: 2023-06-17 | End: 2023-06-18

## 2023-06-17 RX ADMIN — MIDODRINE HYDROCHLORIDE 5 MG: 5 TABLET ORAL at 12:02

## 2023-06-17 RX ADMIN — SODIUM CHLORIDE, PRESERVATIVE FREE 10 ML: 5 INJECTION INTRAVENOUS at 09:07

## 2023-06-17 RX ADMIN — CEFEPIME 1000 MG: 1 INJECTION, POWDER, FOR SOLUTION INTRAMUSCULAR; INTRAVENOUS at 23:33

## 2023-06-17 RX ADMIN — POTASSIUM CHLORIDE 20 MEQ: 29.8 INJECTION, SOLUTION INTRAVENOUS at 19:31

## 2023-06-17 RX ADMIN — Medication 1 AMPULE: at 09:07

## 2023-06-17 RX ADMIN — Medication: at 09:07

## 2023-06-17 RX ADMIN — Medication: at 12:47

## 2023-06-17 RX ADMIN — POTASSIUM CHLORIDE 20 MEQ: 29.8 INJECTION, SOLUTION INTRAVENOUS at 10:17

## 2023-06-17 RX ADMIN — Medication: at 21:00

## 2023-06-17 RX ADMIN — POTASSIUM CHLORIDE 20 MEQ: 29.8 INJECTION, SOLUTION INTRAVENOUS at 18:20

## 2023-06-17 RX ADMIN — SODIUM CHLORIDE, PRESERVATIVE FREE 10 ML: 5 INJECTION INTRAVENOUS at 21:25

## 2023-06-17 RX ADMIN — POTASSIUM CHLORIDE 20 MEQ: 29.8 INJECTION, SOLUTION INTRAVENOUS at 11:28

## 2023-06-17 RX ADMIN — MIDODRINE HYDROCHLORIDE 5 MG: 5 TABLET ORAL at 17:42

## 2023-06-17 RX ADMIN — Medication 1 AMPULE: at 21:05

## 2023-06-17 RX ADMIN — CEFEPIME 1000 MG: 1 INJECTION, POWDER, FOR SOLUTION INTRAMUSCULAR; INTRAVENOUS at 12:40

## 2023-06-17 RX ADMIN — POTASSIUM CHLORIDE 20 MEQ: 29.8 INJECTION, SOLUTION INTRAVENOUS at 20:33

## 2023-06-17 RX ADMIN — POTASSIUM CHLORIDE 20 MEQ: 29.8 INJECTION, SOLUTION INTRAVENOUS at 09:08

## 2023-06-17 RX ADMIN — Medication: at 09:08

## 2023-06-17 ASSESSMENT — PAIN SCALES - GENERAL: PAINLEVEL_OUTOF10: 0

## 2023-06-18 LAB
ANION GAP SERPL CALC-SCNC: 8 MMOL/L (ref 5–15)
ANION GAP SERPL CALC-SCNC: 8 MMOL/L (ref 5–15)
BUN SERPL-MCNC: 61 MG/DL (ref 6–20)
BUN SERPL-MCNC: 70 MG/DL (ref 6–20)
BUN/CREAT SERPL: 45 (ref 12–20)
BUN/CREAT SERPL: 55 (ref 12–20)
CALCIUM SERPL-MCNC: 11.1 MG/DL (ref 8.5–10.1)
CALCIUM SERPL-MCNC: 11.2 MG/DL (ref 8.5–10.1)
CHLORIDE SERPL-SCNC: 109 MMOL/L (ref 97–108)
CHLORIDE SERPL-SCNC: 110 MMOL/L (ref 97–108)
CO2 SERPL-SCNC: 31 MMOL/L (ref 21–32)
CO2 SERPL-SCNC: 31 MMOL/L (ref 21–32)
CREAT SERPL-MCNC: 1.27 MG/DL (ref 0.55–1.02)
CREAT SERPL-MCNC: 1.37 MG/DL (ref 0.55–1.02)
GLUCOSE SERPL-MCNC: 137 MG/DL (ref 65–100)
GLUCOSE SERPL-MCNC: 191 MG/DL (ref 65–100)
POTASSIUM SERPL-SCNC: 3.3 MMOL/L (ref 3.5–5.1)
POTASSIUM SERPL-SCNC: 3.8 MMOL/L (ref 3.5–5.1)
SODIUM SERPL-SCNC: 148 MMOL/L (ref 136–145)
SODIUM SERPL-SCNC: 149 MMOL/L (ref 136–145)
UFH PPP CHRO-ACNC: <0.1 IU/ML

## 2023-06-18 PROCEDURE — 80048 BASIC METABOLIC PNL TOTAL CA: CPT

## 2023-06-18 PROCEDURE — 2580000003 HC RX 258: Performed by: STUDENT IN AN ORGANIZED HEALTH CARE EDUCATION/TRAINING PROGRAM

## 2023-06-18 PROCEDURE — 36415 COLL VENOUS BLD VENIPUNCTURE: CPT

## 2023-06-18 PROCEDURE — 6370000000 HC RX 637 (ALT 250 FOR IP): Performed by: INTERNAL MEDICINE

## 2023-06-18 PROCEDURE — 2580000003 HC RX 258: Performed by: NURSE PRACTITIONER

## 2023-06-18 PROCEDURE — 2060000000 HC ICU INTERMEDIATE R&B

## 2023-06-18 PROCEDURE — 6360000002 HC RX W HCPCS: Performed by: STUDENT IN AN ORGANIZED HEALTH CARE EDUCATION/TRAINING PROGRAM

## 2023-06-18 PROCEDURE — 85520 HEPARIN ASSAY: CPT

## 2023-06-18 PROCEDURE — 6370000000 HC RX 637 (ALT 250 FOR IP): Performed by: NURSE PRACTITIONER

## 2023-06-18 RX ORDER — MIDODRINE HYDROCHLORIDE 5 MG/1
5 TABLET ORAL 2 TIMES DAILY WITH MEALS
Status: DISCONTINUED | OUTPATIENT
Start: 2023-06-18 | End: 2023-06-22 | Stop reason: HOSPADM

## 2023-06-18 RX ORDER — DEXTROSE MONOHYDRATE 50 MG/ML
INJECTION, SOLUTION INTRAVENOUS CONTINUOUS
Status: DISCONTINUED | OUTPATIENT
Start: 2023-06-18 | End: 2023-06-19

## 2023-06-18 RX ADMIN — Medication: at 16:19

## 2023-06-18 RX ADMIN — DEXTROSE MONOHYDRATE: 50 INJECTION, SOLUTION INTRAVENOUS at 04:45

## 2023-06-18 RX ADMIN — Medication: at 09:45

## 2023-06-18 RX ADMIN — DEXTROSE MONOHYDRATE: 50 INJECTION, SOLUTION INTRAVENOUS at 11:38

## 2023-06-18 RX ADMIN — POTASSIUM BICARBONATE 40 MEQ: 782 TABLET, EFFERVESCENT ORAL at 04:43

## 2023-06-18 RX ADMIN — Medication: at 21:00

## 2023-06-18 RX ADMIN — Medication 1 AMPULE: at 09:45

## 2023-06-18 RX ADMIN — CEFEPIME 2000 MG: 2 INJECTION, POWDER, FOR SOLUTION INTRAVENOUS at 12:31

## 2023-06-18 RX ADMIN — CEFEPIME 2000 MG: 2 INJECTION, POWDER, FOR SOLUTION INTRAVENOUS at 23:53

## 2023-06-18 RX ADMIN — Medication 1 AMPULE: at 21:00

## 2023-06-18 RX ADMIN — SODIUM CHLORIDE, PRESERVATIVE FREE 10 ML: 5 INJECTION INTRAVENOUS at 09:46

## 2023-06-18 RX ADMIN — SODIUM CHLORIDE, PRESERVATIVE FREE 10 ML: 5 INJECTION INTRAVENOUS at 21:00

## 2023-06-18 RX ADMIN — RIVAROXABAN 20 MG: 20 TABLET, FILM COATED ORAL at 09:45

## 2023-06-18 ASSESSMENT — PAIN SCALES - GENERAL: PAINLEVEL_OUTOF10: 0

## 2023-06-19 LAB
ANION GAP SERPL CALC-SCNC: 5 MMOL/L (ref 5–15)
BASOPHILS # BLD: 0.1 K/UL (ref 0–0.1)
BASOPHILS NFR BLD: 1 % (ref 0–1)
BUN SERPL-MCNC: 46 MG/DL (ref 6–20)
BUN/CREAT SERPL: 52 (ref 12–20)
CALCIUM SERPL-MCNC: 10.4 MG/DL (ref 8.5–10.1)
CHLORIDE SERPL-SCNC: 104 MMOL/L (ref 97–108)
CO2 SERPL-SCNC: 36 MMOL/L (ref 21–32)
CREAT SERPL-MCNC: 0.89 MG/DL (ref 0.55–1.02)
DIFFERENTIAL METHOD BLD: ABNORMAL
EOSINOPHIL # BLD: 0.4 K/UL (ref 0–0.4)
EOSINOPHIL NFR BLD: 3 % (ref 0–7)
ERYTHROCYTE [DISTWIDTH] IN BLOOD BY AUTOMATED COUNT: 14.6 % (ref 11.5–14.5)
FERRITIN SERPL-MCNC: 412 NG/ML (ref 26–388)
FOLATE SERPL-MCNC: 11.9 NG/ML (ref 5–21)
GLUCOSE SERPL-MCNC: 116 MG/DL (ref 65–100)
HCT VFR BLD AUTO: 26.8 % (ref 35–47)
HGB BLD-MCNC: 8.5 G/DL (ref 11.5–16)
IMM GRANULOCYTES # BLD AUTO: 0.3 K/UL (ref 0–0.04)
IMM GRANULOCYTES NFR BLD AUTO: 2 % (ref 0–0.5)
IRON SATN MFR SERPL: 23 % (ref 20–50)
IRON SERPL-MCNC: 40 UG/DL (ref 35–150)
LYMPHOCYTES # BLD: 2 K/UL (ref 0.8–3.5)
LYMPHOCYTES NFR BLD: 15 % (ref 12–49)
MAGNESIUM SERPL-MCNC: 1.5 MG/DL (ref 1.6–2.4)
MCH RBC QN AUTO: 31.3 PG (ref 26–34)
MCHC RBC AUTO-ENTMCNC: 31.7 G/DL (ref 30–36.5)
MCV RBC AUTO: 98.5 FL (ref 80–99)
MONOCYTES # BLD: 1.4 K/UL (ref 0–1)
MONOCYTES NFR BLD: 11 % (ref 5–13)
NEUTS SEG # BLD: 9.2 K/UL (ref 1.8–8)
NEUTS SEG NFR BLD: 69 % (ref 32–75)
NRBC # BLD: 0.03 K/UL (ref 0–0.01)
NRBC BLD-RTO: 0.2 PER 100 WBC
PHOSPHATE SERPL-MCNC: 1.6 MG/DL (ref 2.6–4.7)
PLATELET # BLD AUTO: 187 K/UL (ref 150–400)
PMV BLD AUTO: 10.6 FL (ref 8.9–12.9)
POTASSIUM SERPL-SCNC: 2.7 MMOL/L (ref 3.5–5.1)
RBC # BLD AUTO: 2.72 M/UL (ref 3.8–5.2)
SODIUM SERPL-SCNC: 145 MMOL/L (ref 136–145)
TIBC SERPL-MCNC: 172 UG/DL (ref 250–450)
VIT B12 SERPL-MCNC: >2000 PG/ML (ref 193–986)
WBC # BLD AUTO: 13.3 K/UL (ref 3.6–11)

## 2023-06-19 PROCEDURE — 2580000003 HC RX 258: Performed by: NURSE PRACTITIONER

## 2023-06-19 PROCEDURE — 36415 COLL VENOUS BLD VENIPUNCTURE: CPT

## 2023-06-19 PROCEDURE — 6370000000 HC RX 637 (ALT 250 FOR IP): Performed by: INTERNAL MEDICINE

## 2023-06-19 PROCEDURE — 2060000000 HC ICU INTERMEDIATE R&B

## 2023-06-19 PROCEDURE — 6360000002 HC RX W HCPCS: Performed by: STUDENT IN AN ORGANIZED HEALTH CARE EDUCATION/TRAINING PROGRAM

## 2023-06-19 PROCEDURE — 82728 ASSAY OF FERRITIN: CPT

## 2023-06-19 PROCEDURE — 6370000000 HC RX 637 (ALT 250 FOR IP): Performed by: STUDENT IN AN ORGANIZED HEALTH CARE EDUCATION/TRAINING PROGRAM

## 2023-06-19 PROCEDURE — 84100 ASSAY OF PHOSPHORUS: CPT

## 2023-06-19 PROCEDURE — 82607 VITAMIN B-12: CPT

## 2023-06-19 PROCEDURE — 2580000003 HC RX 258: Performed by: STUDENT IN AN ORGANIZED HEALTH CARE EDUCATION/TRAINING PROGRAM

## 2023-06-19 PROCEDURE — 80048 BASIC METABOLIC PNL TOTAL CA: CPT

## 2023-06-19 PROCEDURE — 85025 COMPLETE CBC W/AUTO DIFF WBC: CPT

## 2023-06-19 PROCEDURE — 2500000003 HC RX 250 WO HCPCS: Performed by: STUDENT IN AN ORGANIZED HEALTH CARE EDUCATION/TRAINING PROGRAM

## 2023-06-19 PROCEDURE — 83540 ASSAY OF IRON: CPT

## 2023-06-19 PROCEDURE — 82746 ASSAY OF FOLIC ACID SERUM: CPT

## 2023-06-19 PROCEDURE — 6360000002 HC RX W HCPCS: Performed by: HOSPITALIST

## 2023-06-19 PROCEDURE — 83550 IRON BINDING TEST: CPT

## 2023-06-19 PROCEDURE — 83735 ASSAY OF MAGNESIUM: CPT

## 2023-06-19 RX ORDER — POTASSIUM CHLORIDE 7.45 MG/ML
10 INJECTION INTRAVENOUS
Status: COMPLETED | OUTPATIENT
Start: 2023-06-19 | End: 2023-06-19

## 2023-06-19 RX ORDER — LANOLIN ALCOHOL/MO/W.PET/CERES
400 CREAM (GRAM) TOPICAL DAILY
Status: DISCONTINUED | OUTPATIENT
Start: 2023-06-19 | End: 2023-06-22 | Stop reason: HOSPADM

## 2023-06-19 RX ORDER — POTASSIUM CHLORIDE 20 MEQ/1
20 TABLET, EXTENDED RELEASE ORAL
Status: DISCONTINUED | OUTPATIENT
Start: 2023-06-20 | End: 2023-06-22 | Stop reason: HOSPADM

## 2023-06-19 RX ORDER — POTASSIUM CHLORIDE 20 MEQ/1
40 TABLET, EXTENDED RELEASE ORAL ONCE
Status: COMPLETED | OUTPATIENT
Start: 2023-06-19 | End: 2023-06-19

## 2023-06-19 RX ORDER — POTASSIUM CHLORIDE 7.45 MG/ML
10 INJECTION INTRAVENOUS
Status: DISCONTINUED | OUTPATIENT
Start: 2023-06-19 | End: 2023-06-19 | Stop reason: SDUPTHER

## 2023-06-19 RX ADMIN — MIDODRINE HYDROCHLORIDE 5 MG: 5 TABLET ORAL at 08:53

## 2023-06-19 RX ADMIN — SODIUM CHLORIDE, PRESERVATIVE FREE 10 ML: 5 INJECTION INTRAVENOUS at 08:55

## 2023-06-19 RX ADMIN — POTASSIUM CHLORIDE 10 MEQ: 10 INJECTION, SOLUTION INTRAVENOUS at 08:54

## 2023-06-19 RX ADMIN — Medication 1 AMPULE: at 22:22

## 2023-06-19 RX ADMIN — AMIODARONE HYDROCHLORIDE 150 MG: 1.5 INJECTION, SOLUTION INTRAVENOUS at 16:19

## 2023-06-19 RX ADMIN — Medication: at 22:23

## 2023-06-19 RX ADMIN — DEXTROSE MONOHYDRATE: 50 INJECTION, SOLUTION INTRAVENOUS at 02:56

## 2023-06-19 RX ADMIN — POTASSIUM CHLORIDE 10 MEQ: 10 INJECTION, SOLUTION INTRAVENOUS at 09:30

## 2023-06-19 RX ADMIN — Medication: at 08:54

## 2023-06-19 RX ADMIN — SODIUM CHLORIDE, PRESERVATIVE FREE 10 ML: 5 INJECTION INTRAVENOUS at 22:23

## 2023-06-19 RX ADMIN — SODIUM CHLORIDE 1000 MG: 900 INJECTION INTRAVENOUS at 10:58

## 2023-06-19 RX ADMIN — Medication: at 22:22

## 2023-06-19 RX ADMIN — AMIODARONE HYDROCHLORIDE 1 MG/MIN: 50 INJECTION, SOLUTION INTRAVENOUS at 17:45

## 2023-06-19 RX ADMIN — POTASSIUM CHLORIDE 40 MEQ: 20 TABLET, EXTENDED RELEASE ORAL at 18:03

## 2023-06-19 RX ADMIN — RIVAROXABAN 15 MG: 15 TABLET, FILM COATED ORAL at 08:54

## 2023-06-19 RX ADMIN — POTASSIUM CHLORIDE 10 MEQ: 7.46 INJECTION, SOLUTION INTRAVENOUS at 06:53

## 2023-06-19 RX ADMIN — POTASSIUM CHLORIDE 10 MEQ: 10 INJECTION, SOLUTION INTRAVENOUS at 07:52

## 2023-06-19 RX ADMIN — MIDODRINE HYDROCHLORIDE 5 MG: 5 TABLET ORAL at 16:37

## 2023-06-19 RX ADMIN — Medication 400 MG: at 18:03

## 2023-06-19 RX ADMIN — Medication: at 08:55

## 2023-06-19 RX ADMIN — Medication: at 15:38

## 2023-06-19 ASSESSMENT — PAIN SCALES - GENERAL
PAINLEVEL_OUTOF10: 0

## 2023-06-19 NOTE — WOUND CARE
Wound Care nurse saw this patient last week. She has wound care orders already written for her wound care from 6-15-23. See the Wound Care orders.    Carloz Pitts RN, BSN, [employer]

## 2023-06-19 NOTE — CARE COORDINATION
Transition of Care Plan:    RUR: 14% (low RUR)  Prior Level of Functioning: Independent  Disposition: SNF - choices pending  If SNF or IPR: Date FOC offered: 6/19/2023  Date FOC received: TBD  Accepting facility: [facility]  Date authorization started with reference number: TBD  Date authorization received and expires: TBD  Follow up appointments: defer to SNF  DME needed: defer to SNF  Trans[facility] at discharge: [facility] anticipated  IM/IMM Medicare/ letter given: 2nd IM needed prior to discharge. Is patient a [place] and connected with VA? No.   If yes, was [employer] transfer form completed and VA notified? N/A  Caregiver Contact: Agusto Dc - [phone]  Discharge Caregiver contacted prior to discharge? Care Conference needed? Barriers to discharge:  voiding trial, palliative clearance, cardiology clearance, insurance auth, SNF choices.  met with Nell Bruner DTR, and provided her with SNF list and CM callback number. Nell Bruner will review list and get back to  in order to have an accepting facility begin insurance authorization.       Adonis Rea, BSN, RN    Care Management  [phone]

## 2023-06-20 LAB
ANION GAP SERPL CALC-SCNC: 4 MMOL/L (ref 5–15)
BACTERIA SPEC CULT: NORMAL
BACTERIA SPEC CULT: NORMAL
BASOPHILS # BLD: 0.1 K/UL (ref 0–0.1)
BASOPHILS NFR BLD: 1 % (ref 0–1)
BUN SERPL-MCNC: 33 MG/DL (ref 6–20)
BUN/CREAT SERPL: 58 (ref 12–20)
CALCIUM SERPL-MCNC: 8.7 MG/DL (ref 8.5–10.1)
CHLORIDE SERPL-SCNC: 105 MMOL/L (ref 97–108)
CO2 SERPL-SCNC: 32 MMOL/L (ref 21–32)
CREAT SERPL-MCNC: 0.57 MG/DL (ref 0.55–1.02)
DIFFERENTIAL METHOD BLD: ABNORMAL
EOSINOPHIL # BLD: 0.9 K/UL (ref 0–0.4)
EOSINOPHIL NFR BLD: 7 % (ref 0–7)
ERYTHROCYTE [DISTWIDTH] IN BLOOD BY AUTOMATED COUNT: 14.1 % (ref 11.5–14.5)
GLUCOSE SERPL-MCNC: 99 MG/DL (ref 65–100)
HCT VFR BLD AUTO: 26.2 % (ref 35–47)
HGB BLD-MCNC: 8.6 G/DL (ref 11.5–16)
IMM GRANULOCYTES # BLD AUTO: 0.2 K/UL (ref 0–0.04)
IMM GRANULOCYTES NFR BLD AUTO: 2 % (ref 0–0.5)
LYMPHOCYTES # BLD: 1.8 K/UL (ref 0.8–3.5)
LYMPHOCYTES NFR BLD: 15 % (ref 12–49)
MAGNESIUM SERPL-MCNC: 1.2 MG/DL (ref 1.6–2.4)
MCH RBC QN AUTO: 32.3 PG (ref 26–34)
MCHC RBC AUTO-ENTMCNC: 32.8 G/DL (ref 30–36.5)
MCV RBC AUTO: 98.5 FL (ref 80–99)
MONOCYTES # BLD: 1.2 K/UL (ref 0–1)
MONOCYTES NFR BLD: 10 % (ref 5–13)
NEUTS SEG # BLD: 7.7 K/UL (ref 1.8–8)
NEUTS SEG NFR BLD: 65 % (ref 32–75)
NRBC # BLD: 0 K/UL (ref 0–0.01)
NRBC BLD-RTO: 0 PER 100 WBC
PHOSPHATE SERPL-MCNC: 1.8 MG/DL (ref 2.6–4.7)
PLATELET # BLD AUTO: 157 K/UL (ref 150–400)
PMV BLD AUTO: 10.7 FL (ref 8.9–12.9)
POTASSIUM SERPL-SCNC: 3.2 MMOL/L (ref 3.5–5.1)
RBC # BLD AUTO: 2.66 M/UL (ref 3.8–5.2)
SERVICE CMNT-IMP: NORMAL
SERVICE CMNT-IMP: NORMAL
SODIUM SERPL-SCNC: 141 MMOL/L (ref 136–145)
WBC # BLD AUTO: 11.8 K/UL (ref 3.6–11)

## 2023-06-20 PROCEDURE — 97116 GAIT TRAINING THERAPY: CPT

## 2023-06-20 PROCEDURE — 84100 ASSAY OF PHOSPHORUS: CPT

## 2023-06-20 PROCEDURE — 97535 SELF CARE MNGMENT TRAINING: CPT | Performed by: OCCUPATIONAL THERAPIST

## 2023-06-20 PROCEDURE — 83735 ASSAY OF MAGNESIUM: CPT

## 2023-06-20 PROCEDURE — 6370000000 HC RX 637 (ALT 250 FOR IP): Performed by: STUDENT IN AN ORGANIZED HEALTH CARE EDUCATION/TRAINING PROGRAM

## 2023-06-20 PROCEDURE — 6370000000 HC RX 637 (ALT 250 FOR IP): Performed by: INTERNAL MEDICINE

## 2023-06-20 PROCEDURE — 2060000000 HC ICU INTERMEDIATE R&B

## 2023-06-20 PROCEDURE — 97530 THERAPEUTIC ACTIVITIES: CPT | Performed by: OCCUPATIONAL THERAPIST

## 2023-06-20 PROCEDURE — 80048 BASIC METABOLIC PNL TOTAL CA: CPT

## 2023-06-20 PROCEDURE — 99233 SBSQ HOSP IP/OBS HIGH 50: CPT | Performed by: INTERNAL MEDICINE

## 2023-06-20 PROCEDURE — 36415 COLL VENOUS BLD VENIPUNCTURE: CPT

## 2023-06-20 PROCEDURE — 2580000003 HC RX 258: Performed by: NURSE PRACTITIONER

## 2023-06-20 PROCEDURE — 6360000002 HC RX W HCPCS: Performed by: STUDENT IN AN ORGANIZED HEALTH CARE EDUCATION/TRAINING PROGRAM

## 2023-06-20 PROCEDURE — 85025 COMPLETE CBC W/AUTO DIFF WBC: CPT

## 2023-06-20 PROCEDURE — 97530 THERAPEUTIC ACTIVITIES: CPT

## 2023-06-20 PROCEDURE — 2580000003 HC RX 258: Performed by: STUDENT IN AN ORGANIZED HEALTH CARE EDUCATION/TRAINING PROGRAM

## 2023-06-20 RX ADMIN — RIVAROXABAN 20 MG: 20 TABLET, FILM COATED ORAL at 08:52

## 2023-06-20 RX ADMIN — MIDODRINE HYDROCHLORIDE 5 MG: 5 TABLET ORAL at 08:52

## 2023-06-20 RX ADMIN — AMIODARONE HYDROCHLORIDE 0.5 MG/MIN: 50 INJECTION, SOLUTION INTRAVENOUS at 04:33

## 2023-06-20 RX ADMIN — Medication: at 21:11

## 2023-06-20 RX ADMIN — Medication: at 08:54

## 2023-06-20 RX ADMIN — POTASSIUM CHLORIDE 20 MEQ: 20 TABLET, EXTENDED RELEASE ORAL at 08:52

## 2023-06-20 RX ADMIN — Medication: at 16:37

## 2023-06-20 RX ADMIN — Medication 400 MG: at 08:52

## 2023-06-20 RX ADMIN — Medication: at 08:53

## 2023-06-20 RX ADMIN — SODIUM CHLORIDE, PRESERVATIVE FREE 10 ML: 5 INJECTION INTRAVENOUS at 21:14

## 2023-06-20 RX ADMIN — Medication 1 AMPULE: at 21:11

## 2023-06-20 RX ADMIN — SODIUM CHLORIDE, PRESERVATIVE FREE 10 ML: 5 INJECTION INTRAVENOUS at 08:52

## 2023-06-20 RX ADMIN — Medication 1 AMPULE: at 12:15

## 2023-06-20 RX ADMIN — MIDODRINE HYDROCHLORIDE 5 MG: 5 TABLET ORAL at 16:33

## 2023-06-20 RX ADMIN — AMIODARONE HYDROCHLORIDE 0.5 MG/MIN: 50 INJECTION, SOLUTION INTRAVENOUS at 21:20

## 2023-06-20 RX ADMIN — Medication: at 21:13

## 2023-06-20 RX ADMIN — SODIUM CHLORIDE 1000 MG: 900 INJECTION INTRAVENOUS at 12:16

## 2023-06-20 ASSESSMENT — PAIN SCALES - GENERAL
PAINLEVEL_OUTOF10: 0

## 2023-06-20 NOTE — CARE COORDINATION
46 - CM spoke with patient's DTR, Jeny Werner, who provided the following options for SNF: [facility], [facility], [facility]. CM encouraged Jenifer to continue reviewing more choices in the event that these SNFs are unable to accept patient. CM sent referrals to [facility] and [address] [place] [address],[address] Floor via [employer] and [address] via [place]. 1156 - CM spoke with Jeny Werner who is agreeable to moving forward with [address] [place] [address],[address] Floor at this time. [address] [place] [address],[facility] Floor notified to begin auth and therapy notes to be requested during IDRs. 1439 - CM spoke with [address] [place] [address],[address] Floor who confirmed Odella Jase was started and they do not have a reference number at this time for the patient. CM notified them that updated PT/OT notes are in the for the patient.       TYESHA MaierN, RN    Care Management  [phone]

## 2023-06-20 NOTE — WOUND CARE
Wound care follow up for the left lower leg wound that was present on admission. Pt. Was initially seen by wound care nurse while in the CCU and orders were written for the wound care on the left leg and for the gluteal cleft fissure. Assessment; patient is much more alert today and talkative. The left lower leg wound is pink and healing well. No more white residue is on the leg wound. On admission: white   Residue thought to be fungal  At first until it peeled off. After the initial debridement / cleaning  In the CCU a few days ago. Today - healing well. Continue  The moist wound care. Plat of care: Continue with the same wound care for both areas and minimize the time on the back / buttocks with regard to continence care. Off load the buttocks every two hours when in the chair. Turn her every two hours when in the bed. Wound care as written.    Francois Brand RN, BSN, [employer]

## 2023-06-21 PROBLEM — M79.674 PAIN IN TOES OF BOTH FEET: Status: ACTIVE | Noted: 2023-06-21

## 2023-06-21 PROBLEM — M79.675 PAIN IN TOES OF BOTH FEET: Status: ACTIVE | Noted: 2023-06-21

## 2023-06-21 PROBLEM — B35.1 DERMATOPHYTOSIS OF NAIL: Status: ACTIVE | Noted: 2023-06-21

## 2023-06-21 LAB
ANION GAP SERPL CALC-SCNC: 5 MMOL/L (ref 5–15)
BASOPHILS # BLD: 0.1 K/UL (ref 0–0.1)
BASOPHILS NFR BLD: 1 % (ref 0–1)
BUN SERPL-MCNC: 29 MG/DL (ref 6–20)
BUN/CREAT SERPL: 52 (ref 12–20)
CALCIUM SERPL-MCNC: 8.7 MG/DL (ref 8.5–10.1)
CHLORIDE SERPL-SCNC: 103 MMOL/L (ref 97–108)
CO2 SERPL-SCNC: 31 MMOL/L (ref 21–32)
CREAT SERPL-MCNC: 0.56 MG/DL (ref 0.55–1.02)
DIFFERENTIAL METHOD BLD: ABNORMAL
EOSINOPHIL # BLD: 0.8 K/UL (ref 0–0.4)
EOSINOPHIL NFR BLD: 7 % (ref 0–7)
ERYTHROCYTE [DISTWIDTH] IN BLOOD BY AUTOMATED COUNT: 14.8 % (ref 11.5–14.5)
GLUCOSE SERPL-MCNC: 100 MG/DL (ref 65–100)
HCT VFR BLD AUTO: 27 % (ref 35–47)
HGB BLD-MCNC: 8.5 G/DL (ref 11.5–16)
IMM GRANULOCYTES # BLD AUTO: 0.2 K/UL (ref 0–0.04)
IMM GRANULOCYTES NFR BLD AUTO: 1 % (ref 0–0.5)
LYMPHOCYTES # BLD: 1.8 K/UL (ref 0.8–3.5)
LYMPHOCYTES NFR BLD: 16 % (ref 12–49)
MAGNESIUM SERPL-MCNC: 1.4 MG/DL (ref 1.6–2.4)
MCH RBC QN AUTO: 31.1 PG (ref 26–34)
MCHC RBC AUTO-ENTMCNC: 31.5 G/DL (ref 30–36.5)
MCV RBC AUTO: 98.9 FL (ref 80–99)
MONOCYTES # BLD: 1.3 K/UL (ref 0–1)
MONOCYTES NFR BLD: 11 % (ref 5–13)
NEUTS SEG # BLD: 7.2 K/UL (ref 1.8–8)
NEUTS SEG NFR BLD: 64 % (ref 32–75)
NRBC # BLD: 0 K/UL (ref 0–0.01)
NRBC BLD-RTO: 0 PER 100 WBC
PHOSPHATE SERPL-MCNC: 2 MG/DL (ref 2.6–4.7)
PLATELET # BLD AUTO: 164 K/UL (ref 150–400)
PMV BLD AUTO: 10.4 FL (ref 8.9–12.9)
POTASSIUM SERPL-SCNC: 3.3 MMOL/L (ref 3.5–5.1)
RBC # BLD AUTO: 2.73 M/UL (ref 3.8–5.2)
SODIUM SERPL-SCNC: 139 MMOL/L (ref 136–145)
WBC # BLD AUTO: 11.3 K/UL (ref 3.6–11)

## 2023-06-21 PROCEDURE — 2580000003 HC RX 258: Performed by: NURSE PRACTITIONER

## 2023-06-21 PROCEDURE — 6360000002 HC RX W HCPCS: Performed by: STUDENT IN AN ORGANIZED HEALTH CARE EDUCATION/TRAINING PROGRAM

## 2023-06-21 PROCEDURE — 6370000000 HC RX 637 (ALT 250 FOR IP): Performed by: INTERNAL MEDICINE

## 2023-06-21 PROCEDURE — 6360000002 HC RX W HCPCS: Performed by: NURSE PRACTITIONER

## 2023-06-21 PROCEDURE — 6370000000 HC RX 637 (ALT 250 FOR IP): Performed by: NURSE PRACTITIONER

## 2023-06-21 PROCEDURE — 97535 SELF CARE MNGMENT TRAINING: CPT | Performed by: OCCUPATIONAL THERAPIST

## 2023-06-21 PROCEDURE — 80048 BASIC METABOLIC PNL TOTAL CA: CPT

## 2023-06-21 PROCEDURE — 99233 SBSQ HOSP IP/OBS HIGH 50: CPT | Performed by: INTERNAL MEDICINE

## 2023-06-21 PROCEDURE — 36415 COLL VENOUS BLD VENIPUNCTURE: CPT

## 2023-06-21 PROCEDURE — 84100 ASSAY OF PHOSPHORUS: CPT

## 2023-06-21 PROCEDURE — 2580000003 HC RX 258: Performed by: STUDENT IN AN ORGANIZED HEALTH CARE EDUCATION/TRAINING PROGRAM

## 2023-06-21 PROCEDURE — 99222 1ST HOSP IP/OBS MODERATE 55: CPT | Performed by: PODIATRIST

## 2023-06-21 PROCEDURE — 83735 ASSAY OF MAGNESIUM: CPT

## 2023-06-21 PROCEDURE — 2060000000 HC ICU INTERMEDIATE R&B

## 2023-06-21 PROCEDURE — 11721 DEBRIDE NAIL 6 OR MORE: CPT | Performed by: PODIATRIST

## 2023-06-21 PROCEDURE — 6370000000 HC RX 637 (ALT 250 FOR IP): Performed by: STUDENT IN AN ORGANIZED HEALTH CARE EDUCATION/TRAINING PROGRAM

## 2023-06-21 PROCEDURE — 85025 COMPLETE CBC W/AUTO DIFF WBC: CPT

## 2023-06-21 RX ORDER — AMIODARONE HYDROCHLORIDE 200 MG/1
400 TABLET ORAL 2 TIMES DAILY
Status: DISCONTINUED | OUTPATIENT
Start: 2023-06-21 | End: 2023-06-22 | Stop reason: HOSPADM

## 2023-06-21 RX ADMIN — MIDODRINE HYDROCHLORIDE 5 MG: 5 TABLET ORAL at 17:03

## 2023-06-21 RX ADMIN — Medication: at 13:25

## 2023-06-21 RX ADMIN — SODIUM CHLORIDE, PRESERVATIVE FREE 10 ML: 5 INJECTION INTRAVENOUS at 21:02

## 2023-06-21 RX ADMIN — POTASSIUM CHLORIDE 20 MEQ: 20 TABLET, EXTENDED RELEASE ORAL at 08:37

## 2023-06-21 RX ADMIN — ACETAMINOPHEN 650 MG: 325 TABLET ORAL at 11:19

## 2023-06-21 RX ADMIN — Medication 1 AMPULE: at 08:37

## 2023-06-21 RX ADMIN — SODIUM CHLORIDE 1000 MG: 900 INJECTION INTRAVENOUS at 11:15

## 2023-06-21 RX ADMIN — AMIODARONE HYDROCHLORIDE 400 MG: 200 TABLET ORAL at 21:01

## 2023-06-21 RX ADMIN — SODIUM CHLORIDE, PRESERVATIVE FREE 10 ML: 5 INJECTION INTRAVENOUS at 08:38

## 2023-06-21 RX ADMIN — Medication 1 AMPULE: at 21:03

## 2023-06-21 RX ADMIN — Medication: at 21:02

## 2023-06-21 RX ADMIN — MIDODRINE HYDROCHLORIDE 5 MG: 5 TABLET ORAL at 08:37

## 2023-06-21 RX ADMIN — Medication 400 MG: at 08:37

## 2023-06-21 RX ADMIN — RIVAROXABAN 20 MG: 20 TABLET, FILM COATED ORAL at 08:37

## 2023-06-21 RX ADMIN — AMIODARONE HYDROCHLORIDE 0.5 MG/MIN: 50 INJECTION, SOLUTION INTRAVENOUS at 11:34

## 2023-06-21 RX ADMIN — Medication: at 11:08

## 2023-06-21 RX ADMIN — ONDANSETRON 4 MG: 2 INJECTION INTRAMUSCULAR; INTRAVENOUS at 19:03

## 2023-06-21 RX ADMIN — Medication: at 21:01

## 2023-06-21 RX ADMIN — Medication: at 08:41

## 2023-06-21 ASSESSMENT — PAIN SCALES - GENERAL
PAINLEVEL_OUTOF10: 2
PAINLEVEL_OUTOF10: 0
PAINLEVEL_OUTOF10: 7
PAINLEVEL_OUTOF10: 0
PAINLEVEL_OUTOF10: 0

## 2023-06-21 ASSESSMENT — PAIN DESCRIPTION - LOCATION
LOCATION: KNEE
LOCATION: KNEE

## 2023-06-21 ASSESSMENT — ENCOUNTER SYMPTOMS
VOMITING: 0
DIARRHEA: 0
ABDOMINAL PAIN: 0
SHORTNESS OF BREATH: 0

## 2023-06-21 ASSESSMENT — PAIN DESCRIPTION - ORIENTATION
ORIENTATION: ANTERIOR
ORIENTATION: ANTERIOR

## 2023-06-21 NOTE — CONSULTS
Palliative Medicine  [phone]    My team has been following along peripherally with Ms Kari Olvera, but her goals have remained clear and she is making small progress    At this time we will sign off- please feel free to re consult if goals need to be readdressed    DYLON Anderson - CANELO
Current labs personally reviewed and findings dicussed with patient  - Nail debridement involving the reduction in the thickness and length of the nails to all 10 digits of the feet using nail nipper performed with no incident.   -Patient to follow-up in office for continuance of pedal care.     Jossy العلي DPM, CW, [address] [place], [place] [address]zb[place] , [address] [facility]: [phone]  F: [phone]

## 2023-06-21 NOTE — BH NOTE
Attempted to consult with patient but per the nurse, patient is currently working with PT/OT. Will follow up tomorrow.

## 2023-06-21 NOTE — BSMART NOTE
[facility]     Section I - Integrated Summary    Chief Complaint is altered mental status. LOS:  7 days    Presenting problem/Summary:  Liaison met f/f with pt in [facility]. Pt presents as laying in bed watching tv comfortably. She appears alert, calm, cooperative, pleasant, and in no current distress. Pt reports improved mood that has corresponded to improved health. She says that at home she is generally content and happy. She explains that she had a fever at home, and she fell because she should have paid closer attention to what she was doing. She called her daughter with her cell phone that she says she keeps with her at all times, and she brought pt to the ER. Pt denies any symptoms of depression or anxiety. She denies any SI/HI/AH/VH now or in the last 2 weeks. She denies any perceptual disturbances. Sleep and appetite are typically good. Pt does report eating less while in the hospital b/c food she feels is less palatable. She says that her biggest stressor is being in the hospital.    Pt does seem to have some memory issues at present, although she doesn't acknowledge these issues directly. Pt able to engage in conversation well. She describes her daughter's job in fairly detailed fashion. Similarly, she is able to induct humor into the conversation well. Pt does answer basic orientation questions such as person, place, time, and most of her situation. She does concede that she doesn't keep up with the date, but she looks at her information board to provide it correctly after initially saying 23. She is able to provide her . Pt isn't able to provide her current address, but she says that she just moved into her apartment 2 months ago. She qualifies that she lives in an apartment complex next to [facility]. \" She says that she \"doesn't have a clue\" who the current President is of the [address] [place] [address] Rd[address].  She says that she doesn't follow politics, and she

## 2023-06-21 NOTE — CARE COORDINATION
Patient needs a discharge order once medically ready. Patient is clear from a CM standpoint. Transition of Care Plan: [facility],5Th Floor SNF. RUR: 14% (low RUR)  Prior Level of Functioning: Independent  Disposition: [address] [place], [place] [address] SNF. If SNF or IPR: Date FOC offered: 6/19/2023  Date FOC received: 6/20/2023. Accepting facility: [address] [place] [address] Floor SNF. Date authorization started with reference number: 6/20/2023  Date authorization received and expires: 6/21/2023-6/23/2023 (NRD), [facility] Case #: [id number]. Follow up appointments: defer to SNF  DME needed: defer to SNF  Trans[facility] at discharge: [facility] at [facility]pm, to [facility], bed pending, nursing call report to . [facility] paperwork placed on bedside chart. IM/IMM Medicare/ letter given: 2nd IM received 6/21/2023. Is patient a  and connected with VA? No.              If yes, was [employer] transfer form completed and VA notified? N/A  Caregiver Contact: James Castle - [phone]  Discharge Caregiver contacted prior to discharge? Care Conference needed? Barriers to discharge: cardiology clearance. 18 - CM contacted [address] [place] [address],5Th Floor admissions at  to check on authorization status. They have not receive confirmation regarding authorization approval or denial so they are contacting the company now to determine the status. 1432 -  contacted Dr. Lacey Homans office at [phone] and had their office page Dr. Gogo Aggarwal to contact  regarding discharge for this patient. CM updated patient's DTR, Kanchan Maite, who is agreeable to discharge plan to [address] [place] [address],5[address] Floor tomorrow at [address] confirmed authorization was approved and patient can be transported at 1pm tomorrow. [facility] requested for 1pm and approved. Rachelle7 - KASI Allred'elliott the attending.       Ken Armstrong, BSN, RN    Care Management  [phone]

## 2023-06-22 VITALS
DIASTOLIC BLOOD PRESSURE: 53 MMHG | BODY MASS INDEX: 35.99 KG/M2 | RESPIRATION RATE: 19 BRPM | HEART RATE: 93 BPM | HEIGHT: 62 IN | WEIGHT: 195.6 LBS | OXYGEN SATURATION: 95 % | SYSTOLIC BLOOD PRESSURE: 97 MMHG | TEMPERATURE: 98.6 F

## 2023-06-22 PROCEDURE — 2580000003 HC RX 258: Performed by: NURSE PRACTITIONER

## 2023-06-22 PROCEDURE — 99239 HOSP IP/OBS DSCHRG MGMT >30: CPT | Performed by: INTERNAL MEDICINE

## 2023-06-22 PROCEDURE — 6370000000 HC RX 637 (ALT 250 FOR IP): Performed by: INTERNAL MEDICINE

## 2023-06-22 PROCEDURE — 6370000000 HC RX 637 (ALT 250 FOR IP): Performed by: NURSE PRACTITIONER

## 2023-06-22 PROCEDURE — 6370000000 HC RX 637 (ALT 250 FOR IP): Performed by: STUDENT IN AN ORGANIZED HEALTH CARE EDUCATION/TRAINING PROGRAM

## 2023-06-22 RX ORDER — MIDODRINE HYDROCHLORIDE 5 MG/1
5 TABLET ORAL 2 TIMES DAILY WITH MEALS
Qty: 90 TABLET | Refills: 3 | Status: SHIPPED
Start: 2023-06-22

## 2023-06-22 RX ORDER — AMIODARONE HYDROCHLORIDE 400 MG/1
TABLET ORAL
Qty: 1 TABLET | Refills: 0 | Status: SHIPPED
Start: 2023-06-22

## 2023-06-22 RX ADMIN — Medication: at 17:35

## 2023-06-22 RX ADMIN — RIVAROXABAN 20 MG: 20 TABLET, FILM COATED ORAL at 09:30

## 2023-06-22 RX ADMIN — AMIODARONE HYDROCHLORIDE 400 MG: 200 TABLET ORAL at 09:29

## 2023-06-22 RX ADMIN — ACETAMINOPHEN 650 MG: 325 TABLET ORAL at 00:34

## 2023-06-22 RX ADMIN — Medication: at 09:30

## 2023-06-22 RX ADMIN — MIDODRINE HYDROCHLORIDE 5 MG: 5 TABLET ORAL at 17:35

## 2023-06-22 RX ADMIN — Medication 400 MG: at 09:30

## 2023-06-22 RX ADMIN — MIDODRINE HYDROCHLORIDE 5 MG: 5 TABLET ORAL at 09:29

## 2023-06-22 RX ADMIN — SODIUM CHLORIDE, PRESERVATIVE FREE 10 ML: 5 INJECTION INTRAVENOUS at 11:18

## 2023-06-22 RX ADMIN — POTASSIUM CHLORIDE 20 MEQ: 20 TABLET, EXTENDED RELEASE ORAL at 09:29

## 2023-06-22 ASSESSMENT — PAIN SCALES - GENERAL
PAINLEVEL_OUTOF10: 0
PAINLEVEL_OUTOF10: 9

## 2023-06-22 ASSESSMENT — PAIN DESCRIPTION - LOCATION: LOCATION: KNEE;SHOULDER

## 2023-06-22 ASSESSMENT — PAIN DESCRIPTION - ORIENTATION: ORIENTATION: RIGHT;LEFT;POSTERIOR

## 2023-06-22 ASSESSMENT — PAIN - FUNCTIONAL ASSESSMENT: PAIN_FUNCTIONAL_ASSESSMENT: ACTIVITIES ARE NOT PREVENTED

## 2023-06-22 ASSESSMENT — PAIN DESCRIPTION - DESCRIPTORS: DESCRIPTORS: ACHING

## 2023-06-22 NOTE — PLAN OF CARE
Problem: Discharge Planning  Goal: Discharge to home or other facility with appropriate resources  6/19/2023 1408 by Maria Del Rosario Ozuna RN  Outcome: Progressing  6/19/2023 0921 by Shakira Bradley RN  Outcome: Progressing  Flowsheets (Taken 6/18/2023 2000 by Senait Vital RN)  Discharge to home or other facility with appropriate resources:   Identify barriers to discharge with patient and caregiver   Identify discharge learning needs (meds, wound care, etc)     Problem: ABCDS Injury Assessment  Goal: Absence of physical injury  6/19/2023 1408 by Maria Del Rosario Ozuna RN  Outcome: Progressing  6/19/2023 0921 by Shakira Bradley RN  Outcome: Progressing     Problem: Skin/Tissue Integrity  Goal: Absence of new skin breakdown  Description: 1. Monitor for areas of redness and/or skin breakdown  2. Assess vascular access sites hourly  3. Every 4-6 hours minimum:  Change oxygen saturation probe site  4. Every 4-6 hours:  If on nasal continuous positive airway pressure, respiratory therapy assess nares and determine need for appliance change or resting period.   6/19/2023 0921 by Shakira Bradley RN  Outcome: Progressing     Problem: Safety - Adult  Goal: Free from fall injury  6/19/2023 1408 by Maria Del Rosario Ozuna RN  Outcome: Progressing  6/19/2023 0921 by Shakira Bradley RN  Outcome: Progressing
Problem: Discharge Planning  Goal: Discharge to home or other facility with appropriate resources  6/20/2023 0033 by Mary Tucker RN  Outcome: Progressing  Flowsheets (Taken 6/19/2023 1230 by Katie Mitchell, RN)  Discharge to home or other facility with appropriate resources: Identify barriers to discharge with patient and caregiver  6/20/2023 0031 by Mary Tucker RN  Outcome: Bailey Guajardo (Taken 6/19/2023 1230 by Katie Mitchell, RN)  Discharge to home or other facility with appropriate resources: Identify barriers to discharge with patient and caregiver  6/19/2023 1408 by Katie Mitchell RN  Outcome: Progressing  Flowsheets (Taken 6/19/2023 1230)  Discharge to home or other facility with appropriate resources: Identify barriers to discharge with patient and caregiver     Problem: ABCDS Injury Assessment  Goal: Absence of physical injury  6/20/2023 0033 by Mary Tucker RN  Outcome: Progressing  6/20/2023 0031 by Mary Tucker RN  Outcome: Progressing  6/19/2023 1408 by Katie Mitchell RN  Outcome: Progressing     Problem: Safety - Adult  Goal: Free from fall injury  6/20/2023 0033 by Mary Tucker RN  Outcome: Progressing  6/20/2023 0031 by Mary Tucker RN  Outcome: Progressing  6/19/2023 1408 by Katie Mitchell RN  Outcome: Progressing
Problem: Discharge Planning  Goal: Discharge to home or other facility with appropriate resources  6/20/2023 2350 by Kari Dahl RN  Outcome: Progressing  6/20/2023 1812 by Gigi Vee RN  Outcome: Progressing     Problem: ABCDS Injury Assessment  Goal: Absence of physical injury  6/20/2023 2350 by Kari Dahl RN  Outcome: Progressing  6/20/2023 1812 by Gigi Vee RN  Outcome: Progressing     Problem: Skin/Tissue Integrity  Goal: Absence of new skin breakdown  Description: 1. Monitor for areas of redness and/or skin breakdown  2. Assess vascular access sites hourly  3. Every 4-6 hours minimum:  Change oxygen saturation probe site  4. Every 4-6 hours:  If on nasal continuous positive airway pressure, respiratory therapy assess nares and determine need for appliance change or resting period.   6/20/2023 2350 by Kari Dahl RN  Outcome: Progressing  6/20/2023 1812 by Gigi Vee RN  Outcome: Progressing     Problem: Safety - Adult  Goal: Free from fall injury  6/20/2023 2350 by Kari Dahl RN  Outcome: Progressing  6/20/2023 1812 by Gigi Vee RN  Outcome: Progressing
Problem: Discharge Planning  Goal: Discharge to home or other facility with appropriate resources  6/21/2023 2318 by Enedina Barragan RN  Outcome: Progressing  6/21/2023 2043 by Mir Tovar RN  Outcome: Progressing     Problem: ABCDS Injury Assessment  Goal: Absence of physical injury  6/21/2023 2318 by Enedina Barragan RN  Outcome: Progressing  6/21/2023 2043 by Mir Tovar RN  Outcome: Progressing     Problem: Skin/Tissue Integrity  Goal: Absence of new skin breakdown  Description: 1. Monitor for areas of redness and/or skin breakdown  2. Assess vascular access sites hourly  3. Every 4-6 hours minimum:  Change oxygen saturation probe site  4. Every 4-6 hours:  If on nasal continuous positive airway pressure, respiratory therapy assess nares and determine need for appliance change or resting period.   6/21/2023 2318 by Enedina Barragan RN  Outcome: Progressing  6/21/2023 2043 by Mir Tovar RN  Outcome: Progressing     Problem: Safety - Adult  Goal: Free from fall injury  6/21/2023 2318 by Enedina Barragan RN  Outcome: Progressing  6/21/2023 2043 by Mir Tovar RN  Outcome: Progressing     Problem: Pain  Goal: Verbalizes/displays adequate comfort level or baseline comfort level  6/21/2023 2318 by Enedina Barragan RN  Outcome: Progressing  6/21/2023 2043 by Mir Tovar RN  Outcome: Progressing
Problem: Discharge Planning  Goal: Discharge to home or other facility with appropriate resources  Outcome: Progressing     Problem: ABCDS Injury Assessment  Goal: Absence of physical injury  Outcome: Progressing     Problem: Skin/Tissue Integrity  Goal: Absence of new skin breakdown  Description: 1. Monitor for areas of redness and/or skin breakdown  2. Assess vascular access sites hourly  3. Every 4-6 hours minimum:  Change oxygen saturation probe site  4. Every 4-6 hours:  If on nasal continuous positive airway pressure, respiratory therapy assess nares and determine need for appliance change or resting period. Outcome: Progressing     Problem: Safety - Adult  Goal: Free from fall injury  Outcome: Progressing     Problem: Occupational Therapy - Adult  Goal: By Discharge: Performs self-care activities at highest level of function for planned discharge setting. See evaluation for individualized goals. Description: FUNCTIONAL STATUS PRIOR TO ADMISSION:  Patient lives alone in an apartment. Her daughter lives nearby. Patient is sedentary and has increased difficulty with ADL recently. Receives Help From: Family (daughter only), ADL Assistance: Needs assistance, Bath: Unable to assess(comment) (daughter reports patient did not bath),  ,  ,  ,  ,  , Ambulation Assistance: Independent (homebound most likely), Transfer Assistance: Independent, Active : Yes     HOME SUPPORT: Lives alone with daughter nearby. Occupational Therapy Goals:  Initiated 6/17/2023  1. Patient will participate in basic grooming while in supported sitting with Minimal Assist within 7 day(s). 2.  Patient will sit edge of bed for participation in basic ADL task with Minimal Assist for balance within 7 day(s). 3.  Patient will perform upper body dressing with Minimal Assist within 7 day(s). 4.  Patient will feed herself with Moderate Assist within 7 day(s).   5.  Patient will participate in upper extremity therapeutic
Problem: Discharge Planning  Goal: Discharge to home or other facility with appropriate resources  Outcome: Progressing     Problem: ABCDS Injury Assessment  Goal: Absence of physical injury  Outcome: Progressing     Problem: Skin/Tissue Integrity  Goal: Absence of new skin breakdown  Description: 1. Monitor for areas of redness and/or skin breakdown  2. Assess vascular access sites hourly  3. Every 4-6 hours minimum:  Change oxygen saturation probe site  4. Every 4-6 hours:  If on nasal continuous positive airway pressure, respiratory therapy assess nares and determine need for appliance change or resting period. Outcome: Progressing     Problem: Safety - Adult  Goal: Free from fall injury  Outcome: Progressing     Problem: Physical Therapy - Adult  Goal: By Discharge: Performs mobility at highest level of function for planned discharge setting. See evaluation for individualized goals. Description: FUNCTIONAL STATUS PRIOR TO ADMISSION: Patient was modified independent at home using a rolling walker for functional mobility. The patient  required minimal assistance for basic and instrumental ADLs. , and her daughter states she doubts the patient was able to get into/out of her tub shower. She also reports the patient has been extremely sedentary and she suspects having depression. HOME SUPPORT PRIOR TO ADMISSION: The patient lived alone with her daughter nearby to provide assistance. Physical Therapy Goals  Initiated 6/17/2023  1. Patient will move from supine to sit and sit to supine, scoot up and down, and roll side to side in bed with moderate assistance within 7 day(s). 2.  Patient will perform sit to stand with moderate assistance within 7 day(s). 3.  Patient will transfer from bed to chair and chair to bed with moderate assistance using the least restrictive device within 7 day(s).   4.  Patient will ambulate with moderate assistance for 10 feet with the least restrictive device within 7
Problem: Discharge Planning  Goal: Discharge to home or other facility with appropriate resources  Outcome: Progressing  Flowsheets (Taken 6/18/2023 2000 by Jinny Epley, RN)  Discharge to home or other facility with appropriate resources:   Identify barriers to discharge with patient and caregiver   Identify discharge learning needs (meds, wound care, etc)     Problem: ABCDS Injury Assessment  Goal: Absence of physical injury  Outcome: Progressing     Problem: Skin/Tissue Integrity  Goal: Absence of new skin breakdown  Description: 1. Monitor for areas of redness and/or skin breakdown  2. Assess vascular access sites hourly  3. Every 4-6 hours minimum:  Change oxygen saturation probe site  4. Every 4-6 hours:  If on nasal continuous positive airway pressure, respiratory therapy assess nares and determine need for appliance change or resting period.   Outcome: Progressing     Problem: Safety - Adult  Goal: Free from fall injury  Outcome: Progressing
Problem: Occupational Therapy - Adult  Goal: By Discharge: Performs self-care activities at highest level of function for planned discharge setting. See evaluation for individualized goals. Description: FUNCTIONAL STATUS PRIOR TO ADMISSION:  Patient lives alone in an apartment. Her daughter lives nearby. Patient is sedentary and has increased difficulty with ADL recently. Receives Help From: Family (daughter only), ADL Assistance: Needs assistance, Bath: Unable to assess(comment) (daughter reports patient did not bath),  ,  ,  ,  ,  , Ambulation Assistance: Independent (homebound most likely), Transfer Assistance: Independent, Active : Yes     HOME SUPPORT: Lives alone with daughter nearby. Occupational Therapy Goals:  Initiated 6/17/2023  1. Patient will participate in basic grooming while in supported sitting with Minimal Assist within 7 day(s). 2.  Patient will sit edge of bed for participation in basic ADL task with Minimal Assist for balance within 7 day(s). 3.  Patient will perform upper body dressing with Minimal Assist within 7 day(s). 4.  Patient will feed herself with Moderate Assist within 7 day(s). 5.  Patient will participate in upper extremity therapeutic exercise/activities with Moderate Assist for 10 minutes within 7 day(s). Outcome: Progressing   OCCUPATIONAL THERAPY TREATMENT  Patient: Mary Beth Mata (4 y.o. female)  Date: 6/21/2023  Primary Diagnosis: Dehydration [E86.0]  Lymphedema [D36.7]  Metabolic acidosis [E04.87]  Cellulitis of left lower extremity [B60.350]  Sepsis ([address] [A41.9]  Acute renal failure, unspecified acute renal failure type ([address] [F58.7]  Acute metabolic encephalopathy [X74.03]       Precautions: Up as Tolerated, Bed Alarm                Chart, occupational therapy assessment, plan of care, and goals were reviewed. ASSESSMENT  Patient continues to benefit from skilled OT services and is slowly progressing towards goals.  Pt needed assistance for bed
Problem: Physical Therapy - Adult  Goal: By Discharge: Performs mobility at highest level of function for planned discharge setting. See evaluation for individualized goals. Description: FUNCTIONAL STATUS PRIOR TO ADMISSION: Patient was modified independent at home using a rolling walker for functional mobility. The patient  required minimal assistance for basic and instrumental ADLs. , and her daughter states she doubts the patient was able to get into/out of her tub shower. She also reports the patient has been extremely sedentary and she suspects having depression. HOME SUPPORT PRIOR TO ADMISSION: The patient lived alone with her daughter nearby to provide assistance. Physical Therapy Goals  Initiated 6/17/2023  1. Patient will move from supine to sit and sit to supine, scoot up and down, and roll side to side in bed with moderate assistance within 7 day(s). 2.  Patient will perform sit to stand with moderate assistance within 7 day(s). 3.  Patient will transfer from bed to chair and chair to bed with moderate assistance using the least restrictive device within 7 day(s). 4.  Patient will ambulate with moderate assistance for 10 feet with the least restrictive device within 7 day(s). Outcome: Progressing    PHYSICAL THERAPY TREATMENT    Patient: Beth Ritchie (79 y.o. female)  Date: 6/20/2023  Diagnosis: Dehydration [E86.0]  Lymphedema [Q76.5]  Metabolic acidosis [T35.04]  Cellulitis of left lower extremity [L03.116]  Sepsis ([address] [A41.9]  Acute renal failure, unspecified acute renal failure type ([facility]) [J51.9]  Acute metabolic encephalopathy [U10.51] Sepsis ([address]      Precautions: Up as Tolerated, Bed Alarm                    ASSESSMENT:  Patient continues to benefit from skilled PT services and is progressing towards goals. Patient received in bed and agreeable to participate, daughter also present in room.   Patient was able to come to sit EOB with min to mod assist x 2 and extra time, can
Yes  Rolling: Minimum assistance;Assist X2  Supine to Sit: Minimum assistance; Moderate assistance;Assist X2  Scooting: Moderate assistance     Transfers:   Transfer Training  Transfer Training: Yes  Overall Level of Assistance: Minimum assistance;Assist X2  Interventions: Safety awareness training;Verbal cues  Sit to Stand: Assist X2  Stand to Sit: Minimum assistance;Assist X2  Bed to Chair: Minimum assistance;Assist X2      Balance:  Standing: Impaired  Balance  Sitting: Impaired (able to reach to mid calf on R with encouragement--pt is fearful of falling. uses leaning forward at baseline to perform lower body adls)  Sitting - Static: Fair (occasional)  Sitting - Dynamic: Poor (constant support)  Standing: Impaired  Standing - Static: Constant support; Fair  Standing - Dynamic: Constant support; Fair      ADL Intervention:    Equipment Provided:  (may benefit from using AE for adls.)    Feeding: Stand by assistance;Setup   Feeding Skilled Clinical Factors: pt is alert and seated in chair. set up for cutting food. daughter encouraging pt to eat     Grooming: Setup;Minimal assistance   Grooming Skilled Clinical Factors: Pt will need assistance for her matted hair. able to use wipes for hand cleansing. able to wipe face with cloth with set up                          LE Dressing: Dependent/Total       Toileting: Dependent/Total              Encouraged pt to perform seated exercises throughout the day--ankle pumps, gluteal squeeze, BUE shoulder ROM            Pain Rating  Did not rate pain, but reported pain in B posterior calf  Pain Intervention(s):   elevation and repositioning      Activity Tolerance:   Fair , Poor, requires rest breaks, requires frequent rest breaks, and fear of falling  Please refer to the flowsheet for vital signs taken during this treatment.     After treatment:   Patient left in no apparent distress sitting up in chair, Call bell within reach, Heels elevated for pressure relief, and BLEs

## 2023-06-23 NOTE — DISCHARGE SUMMARY
Physician Discharge Summary     Patient ID:  Linda Nunez  [id number]  39 y.o.  1950    Admit date: 6/14/2023    Discharge date: 6/22/2023    Admission Diagnoses: Dehydration [E86.0]  Lymphedema [H12.9]  Metabolic acidosis [T03.22]  Cellulitis of left lower extremity [L03.116]  Sepsis ([address] [A41.9]  Acute renal failure, unspecified acute renal failure type ([address] [D89.7]  Acute metabolic encephalopathy [D14.81]    Discharge Diagnoses:  Principal Diagnosis Sepsis [facility])     Consults: Critical care, cardiology, podiatry, psychiatry    Significant Diagnostic Studies:     CXR 6/14: No acute process. Venous duplex LE 6/17: No evidence of DVT. Hospital Course: Ms. Linda Nunez is a patient of mine who presented with the problems above. See the initial H and P for full details. 71 y.o. female presents to the emergency department by EMS for generalized fatigue. Patient had been at home today and was in a chair and it slid out. Patient's daughter called 911. She has been more confused at home over the past several days. Patient states that she had injured her leg approximately 2 weeks ago and had been dressing herself at home. There are no reports of any recent fever or chills. She denies any headache or blurred vision. She denies any cough or cold symptoms. She denies any chest pain or shortness of breath. She denies any abdominal pain, nausea, vomiting or diarrhea. She denies any recent urinary symptoms. However patient has had a previous urinary tract infection which resulted in mental status changes. Patient states her legs are chronically edematous right greater than left on a normal basis. PMHx sig for HTN, HLD, Afib. ICU c/s d/t hypotension. Met patient and daughter in ED. They confirm above. Will admit to ICU for cont Mx. By problem. .. Sepsis ([address] due to UTI: E. Coli on urine culture. She is clinically improved on an adequate course of IV ceftriaxone.    Shock,

## 2023-06-23 NOTE — PROGRESS NOTES
0700: Bedside and Verbal shift change report given to Chidi Powell RN (oncoming nurse) by Castillo Guardado (offgoing nurse). Report included the following information Nurse Handoff Report, Index, Intake/Output, MAR, Recent Results, and Cardiac Rhythm NSR .     1050: Bran catheter removed. Pt set up with external catheter will monitor urine output. 1140: Bedside and Verbal shift change report given to Henrique Lundy (oncoming nurse) by Chidi Powell RN (offgoing nurse). Report included the following information Nurse Handoff Report, Index, Intake/Output, MAR, Recent Results, and Cardiac Rhythm NSR to Ngozi .
Bedside and Verbal shift change report given to Ana SZYMANSKI (oncoming nurse) by Caty Marques (offgoing nurse). Report included the following information Nurse Handoff Report, ED Encounter Summary, MAR, Recent Results, and Cardiac Rhythm atrial fib/ NSR .     0745: Patient turned on left side. Daughter at bedside. PT/ OT worked with patient. Patient up in chair, eating breakfast. Tachied up to 152 with exertion, but patient recovered quickly. BP stable. 1028: Messaged Dr. Melia Ayala regarding potassium results and PO potassium supplement. Potassium level 3.2 this morning, up from 2.7 previous morning. Requested clarification on desired potassium coverage amount per team at rounds. 1114: Watching potassium level on current medication regimen for now per MD    1220: Patient daughter at bedside, asked for both podiatry and psych consults. Messaged MD.     1430: Wound care completed for left lower extremity    1754: IP consults placed to psychiatry for AMS and to podiatry for thick nails per Dr. Melia Ayala at patient daughter's request    End of Shift Note    Bedside shift change report given to [facility] RN (oncoming nurse) by Jef Ren RN (offgoing nurse). Report included the following information SBAR, ED Summary, MAR, Recent Results, and Cardiac Rhythm atrial fib/ NSR    Shift worked:  7a-7p     Shift summary and any significant changes:     See above     Concerns for physician to address:       Zone phone for oncoming shift:          Activity:     Number times ambulated in hallways past shift: 0  Number of times OOB to chair past shift: 2    Cardiac:   Cardiac Monitoring: Yes           Access:  Current line(s): PIV     Genitourinary:   Urinary status: voiding and external catheter    Respiratory:      Chronic home O2 use?: NO  Incentive spirometer at bedside: NO       GI:     Current diet:  ADULT ORAL NUTRITION SUPPLEMENT; Breakfast, Lunch, Dinner; Renal Oral Supplement  ADULT DIET;  Regular  DIET ONE TIME
Bedside and Verbal shift change report given to Ana SZYMANSKI (oncoming nurse) by Va Perez (offgoing nurse). Report included the following information Nurse Handoff Report, ED Encounter Summary, MAR, Recent Results, and Cardiac Rhythm Atrial fib/ NSR .     0830: Patient up to chair, eating breakfast. Amiodarone running at 16.7ml/ hr    1114: Patient up in chair. Last two BP 86/49 and 99/49. States she feels tired. Midodrine not due again until 1700. Currently in atrial fib with hr 106. Notified Laurie Purvis MD - from his perspective, SBP of 90 or better is okay. HR < 120s is ok. No need for change in meds just yet. 1345: PT/ OT working with patient. Psychiatry called to complete telehealth consult, will call back later today or try again tomorrow if necessary. 1607: Called Dr. Hamilton Burn office and left message regarding patient's previous blood pressures around 1115 this morning and heart rhthym/ heart rate per Dr. Laurie Purvis. Patient current /51    1630: Amiodarone changed from IV to PO. IV amiodarone stopped. 1903: Patient reports nausea. PRN zofran administered. End of Shift Note    Bedside shift change report given to Va Perez (oncoming nurse) by Edgar Abdalla RN (offgoing nurse).   Report included the following information SBAR, ED Summary, MAR, Recent Results, and Cardiac Rhythm atrial fib/ NSR    Shift worked:  7a-7p     Shift summary and any significant changes:     See above     Concerns for physician to address:       Zone phone for oncoming shift:          Activity:     Number times ambulated in hallways past shift: 0  Number of times OOB to chair past shift: 3    Cardiac:   Cardiac Monitoring: Yes           Access:  Current line(s): PIV     Genitourinary:   Urinary status: voiding and external catheter    Respiratory:      Chronic home O2 use?: NO  Incentive spirometer at bedside: NO       GI:     Current diet:  ADULT ORAL NUTRITION SUPPLEMENT; Breakfast, Lunch, Dinner; Renal Oral
Bedside and Verbal shift change report given to [facility] (oncoming nurse) by Matty Mcdaniel RN (offgoing nurse). Report included the following information Nurse Handoff Report, Index, Intake/Output, MAR, Recent Results, and Cardiac Rhythm SR / A-fib . No significant events overnight    Bedside and Verbal shift change report given to Meeta Carr RN (oncoming nurse) by NESSA Klein (offgoing nurse). Report included the following information Nurse Handoff Report, Index, Intake/Output, MAR, Recent Results, and Cardiac Rhythm SR / A-fib .
Bedside and Verbal shift change report given to Jono Julio RN  (oncoming nurse) by Yumiko Dior RN (offgoing nurse). Report included the following information Nurse Handoff Report, Index, ED Encounter Summary, Intake/Output, MAR, Recent Results, and Cardiac Rhythm NSR .      1430: Pt voided 300 mL dark yellow urine via [place]. 1515: Pt assisted with meal, chewed food but when attempting to swallow reported feeling like the food was sticking and she was unable to swallow. 1530: Pts HR elevated to 120s-150s. Kathya Fregoso MD notified. Pt currently in Afib.     1625: Amio bolus initiated. Current /64, .    1635: Amio bolus complete. /54, HR 91.    1745: Amio drip stared at 1 mg/min. Decrease to 0.5 mg/min at 2345.
Bedside and Verbal shift change report given to Kevin Virgen RN  (oncoming nurse) by Stanislaw Drake RN (offgoing nurse). Report included the following information Nurse Handoff Report, Index, ED Encounter Summary, Intake/Output, MAR, Recent Results, and Cardiac Rhythm NSR .
Bedside and Verbal shift change report given to Latoya, [facility] (oncoming nurse) by Daniel Gamez RN (offgoing nurse). Report included the following information Nurse Handoff Report, Index, Intake/Output, MAR, Recent Results, and Cardiac Rhythm NSR / A-fib. 2310 Notified PA of pt having 5 beats V-TACH. Patient is Asymptomatic.     6718 Notified PA of potassium of 3.3 and scheduled AM PO potassium. No new orders. Bedside and Verbal shift change report given to NESSA Perez (oncoming nurse) by NESSA Klein (offgoing nurse). Report included the following information Nurse Handoff Report, Index, Intake/Output, MAR, Recent Results, and Cardiac Rhythm NSR / A-fib.
Bedside and Verbal shift change report given to NESSA Klein (oncoming nurse) by Gera Nunez RN (offgoing nurse). Report included the following information Nurse Handoff Report, Index, Intake/Output, MAR, and Cardiac Rhythm NSR / Afib .     0450 Notified PA of potassium of 3.2. No new orders. End of Shift Note    Bedside shift change report given to NESSA Perez (oncoming nurse) by Dandy Mccollum RN (offgoing nurse).   Report included the following information SBAR, Kardex, Intake/Output, MAR, Recent Results, and Cardiac Rhythm NSR    Shift worked:  7p-7a     Shift summary and any significant changes:     See above     Concerns for physician to address:       Zone phone for oncoming shift:              Dandy Mccollum RN
Discharge Summary     Patient: Santy Yi MRN: [id number]  SSN: [id number]    YOB: 1950  Age: 71 y.o. Sex: female       Code Status: Full  Allergies: No Known Allergies    Admit Date: 6/14/2023    Discharge Date: 6/22/2023      Admission Diagnoses: Dehydration [E86.0]  Lymphedema [M93.6]  Metabolic acidosis [J62.26]  Cellulitis of left lower extremity [L03.116]  Sepsis ([facility] [address] [A41.9]  Acute renal failure, unspecified acute renal failure type ([facility] [address] [E04.6]  Acute metabolic encephalopathy [I58.81]    PMH:   Past Medical History:   Diagnosis Date    Arrhythmia     ATRIAL FIB    Hypertension        Social History:  Social history   Social History     Tobacco Use    Smoking status: Never    Smokeless tobacco: Never   Substance Use Topics    Alcohol use: No     Drug History   Social History     Substance and Sexual Activity   Drug Use No     Familiy History   Family History   Problem Relation Age of Onset    Heart Failure Mother        Consults: Cardiology    Significant Diagnostic Studies:     Discharge Condition: Stable    Disposition: SNF    Discharge Medications:   Current Discharge Medication List        START taking these medications    Details   amiodarone (PACERONE) 400 MG tablet Take twice a day for 3 days, then once a day thereafter  Qty: 1 tablet, Refills: 0      midodrine (PROAMATINE) 5 MG tablet Take 1 tablet by mouth 2 times daily (with meals)  Qty: 90 tablet, Refills: 3           CONTINUE these medications which have NOT CHANGED    Details   atorvastatin (LIPITOR) 40 MG tablet Take 1 tablet by mouth daily      rivaroxaban (XARELTO) 20 MG TABS tablet TAKE 1 TABLET BY MOUTH ONCE DAILY WITH EVENING MEAL.            STOP taking these medications       bumetanide (BUMEX) 1 MG tablet Comments:   Reason for Stopping:         ferrous sulfate (IRON 325) 325 (65 Fe) MG tablet Comments:   Reason for Stopping:         lisinopril-hydroCHLOROthiazide (PRINZIDE;ZESTORETIC) 10-12.5 MG per tablet
EP/ ARRHYTHMIA/ CARDIOLOGY Progress Note    Patient ID:  Patient: Christian Hennessy  MRN: [id number]  Age: 71 y.o.  : 1950    Date of  Admission: 2023  3:35 PM   PCP:  Cathy Lucas MD    Assessment:   Atrial fibrillation, was on sotalol antiarrhythmic therapy PTA, stopped appropriately due to renal insufficiency documented here. Nonsustained atrial tachycardia. Had multiple salvos today. GNR septic shock with E. Coli. Improved. SAMUEL with metabolic acidosis, uremia. Improved. Chronic lymphedema. Gross hematuria, resolved. Full code. Plan:     Stay off sotalol. Not a good medication with recent renal failure. Similarly dofetilide and flecainide are not options at this time. Continue amiodarone for Afib prophylaxis as well as atrial tachycardia. Change to oral tomorrow 400 mg BID. On , can go to 400 mg daily. Anticoagulation with Xarelto. Anticipate discharge ultimately to a SNF. I'll defer to Dr. Danilo Lucio (her EP) to rechallenge with sotalol after a period of amiodarone washout of at least one month IF her renal failure remains resolved. [x]       High complexity decision making was performed in this patient at high risk for decompensation with multiple organ involvement. Christian Hennessy is a 71 y.o. female with a history of confusion found to be septic. Admitted. She was on sotalol for Afib prophylaxis but stopped due to renal failure noted here in the setting of septic shock. Now out of the ICU. No chest pain. No CHF sx. No TIA or stroke sx otherwise at this point.        No Known Allergies       Current Facility-Administered Medications   Medication Dose Route Frequency    cefTRIAXone (ROCEPHIN) 1,000 mg in sodium chloride 0.9 % 50 mL IVPB (mini-bag)  1,000 mg IntraVENous Q24H    amiodarone (CORDARONE) 450 mg in dextrose 5 % 250 mL infusion ([id number])  0.5 mg/min IntraVENous Continuous    rivaroxaban (XARELTO) tablet 20 mg  20 mg Oral
EP/ ARRHYTHMIA/ CARDIOLOGY Progress Note    Patient ID:  Patient: Hellen Larose  MRN: [id number]  Age: 71 y.o.  : 1950    Date of  Admission: 2023  3:35 PM   PCP:  Belinda Sunshine MD    Assessment:   Atrial fibrillation, was on sotalol antiarrhythmic therapy PTA, stopped appropriately due to renal insufficiency documented here. Nonsustained atrial tachycardia. Had multiple salvos today. GNR septic shock with E. Coli. Improved. SAMUEL with metabolic acidosis, uremia. Improved. Chronic lymphedema. Gross hematuria, resolved. Full code. Plan:     Stay off sotalol. Not a good medication with recent renal failure. Similarly dofetilide and flecainide are not options at this time. Continue amiodarone for Afib prophylaxis as well as atrial tachycardia. Change to oral 400 mg BID. On , can go to 400 mg daily. Anticoagulation with Xarelto. Anticipate discharge ultimately to a SNF. I'll defer to Dr. Momo Alanis (her EP) to rechallenge with sotalol after a period of amiodarone washout of at least one month IF her renal failure remains resolved. [x]       High complexity decision making was performed in this patient at high risk for decompensation with multiple organ involvement. Hellen Larose is a 71 y.o. female with a history of confusion found to be septic. Admitted. She was on sotalol for Afib prophylaxis but stopped due to renal failure noted here in the setting of septic shock. Now out of the ICU. No chest pain. No CHF sx. No TIA or stroke sx otherwise at this point.        No Known Allergies       Current Facility-Administered Medications   Medication Dose Route Frequency    amiodarone (CORDARONE) 450 mg in dextrose 5 % 250 mL infusion ([id number])  0.5 mg/min IntraVENous Continuous    rivaroxaban (XARELTO) tablet 20 mg  20 mg Oral Daily with breakfast    magnesium oxide (MAG-OX) tablet 400 mg  400 mg Oral Daily    potassium chloride (KLOR-CON M)
EP/ ARRHYTHMIA/ CARDIOLOGY Progress Note    Patient ID:  Patient: Poppy Penaloza  MRN: [id number]  Age: 71 y.o.  : 1950    Date of  Admission: 2023  3:35 PM   PCP:  Ramona Mccrary MD    Assessment:   Atrial fibrillation, was on sotalol antiarrhythmic therapy PTA, stopped appropriately due to renal insufficiency documented here. Nonsustained atrial tachycardia none today  GNR septic shock with E. Coli. Improved. SAMUEL with metabolic acidosis, uremia. Improved. Chronic lymphedema. Gross hematuria, resolved. Full code. Plan:     Stay off sotalol. Not a good medication with recent renal failure. Similarly dofetilide and flecainide are not options at this time. Continue amiodarone for Afib prophylaxis as well as atrial tachycardia. Change to oral 400 mg BID. On , can go to 400 mg daily. Anticoagulation with Xarelto. Anticipate discharge ultimately to a SNF. I'll defer to Dr. Betito Ernst (her EP) to rechallenge with sotalol after a period of amiodarone washout of at least one month IF her renal failure remains resolved. [x]       High complexity decision making was performed in this patient at high risk for decompensation with multiple organ involvement. Poppy Penaloza is a 71 y.o. female with a history of confusion found to be septic. Admitted. She was on sotalol for Afib prophylaxis but stopped due to renal failure noted here in the setting of septic shock. Now out of the ICU. No chest pain. No CHF sx. No TIA or stroke sx otherwise at this point.        No Known Allergies       Current Facility-Administered Medications   Medication Dose Route Frequency    amiodarone (CORDARONE) tablet 400 mg  400 mg Oral BID    rivaroxaban (XARELTO) tablet 20 mg  20 mg Oral Daily with breakfast    magnesium oxide (MAG-OX) tablet 400 mg  400 mg Oral Daily    potassium chloride (KLOR-CON M) extended release tablet 20 mEq  20 mEq Oral Daily with breakfast
Hospitalist Progress Note    NAME:   Concepción Smyth   : 1950   MRN: [id number]     Date/Time: 2023 5:35 PM  Patient PCP: Cherelle Naranjo MD    Estimated discharge date: -  Barriers: Wound care, cardiology clearance      Assessment / Plan:    Urosepsis  - chest xray shows lungs are grossly clear bilaterally. Pleural spaces normal. No pneumothorax. - urinalysis shows large amount of blood, 300 protein, positive nitrites, large leukocytes, >100 WBC, +2 bacteria. - urine culture shows >100,000 colonies/ml e. Coli  - MRSA negative  - Blood cultures NGTD  - Urine ecoli pan sensitive  Plan  - continue IV ceftriaxone  - Bran discontinued today     Hypernatremia  CKD vs SAMUEL  Mild hypercalcemia  Poor oral intake  - avoid nephrotoxic medications  - Discontinued D5  - nutrition consult, normal diet resumed  - will monitor labwork     Hypokalemia  Hypomagnesemia  - will monitor lab work  - Potassium replacement protocol in place.  - Low today, replenished.   - A.m. magnesium and potassium     Atrial fibrillation  Hypotension  - continue midodrine  - Xarelto restarted on today  - cardiology following  - Unfortunately developed A-fib with RVR, amiodarone bolus and infusion ordered     Altered mental status  - etiology related to underlying metabolic/infectious process versus underlying depression versus dementia?  - daughter reports history of reclusiveness and lack of self care by patient prior to admission.  - will check vitamin B12, folate, iron profile, vitamin D, and electrolytes  - palliative care consulted for goals of care discussion   - Primary care provider is Dr. Wes Marley. Defer to his expertise with regard to baseline mental status with this patient. Greatly appreciated.      History of Lymphedema  - venous duplex negative for DVT  - keep legs elevated while at rest  - continue wound care as ordered    Medical Decision Making:   I personally reviewed labs: CBC, BMP, magnesium  I personally
INTERNAL MEDICINE ATTENDING NOTE     Patient Name: Kellen Rae   : 1950   Admit: 2023      ASSESSMENT / PLAN    Sepsis ([address] due to UTI: She is on IV ceftriaxone. Atrial fibrillation with RVR: Cardiology, Dr. Rebekah Nguyen, following. She will stay off sotalol given renal failure. Started on amiodarone. Anticoagulation with Xarelto. Encephalopathy: Pt's daughter requests psych consult. SAMUEL: IVF. Watch labs. Mild hypercalcemia:   Poor oral intake: Nutrition consult  Hypokalemia: Replete as needed. Hypomagnesemia: Repleted. Dystrophic nails: Consult podiatry as requested. Code: Full code. Vasu Herrera MD, FACP  Best contact is via pager by hospital  at [phone]. I am also back on coComment. SUBJECTIVE:   Ms. Kellen Rae was seen by me today during rounds. At this time, she is resting comfortably. The patient has no new complaints today. ROS otherwise unremarkable except as noted elsewhere. OBJECTIVE:   Blood pressure 122/65, pulse 97, temperature 97.7 °F (36.5 °C), temperature source Oral, resp. rate 16, height 5' 2.01\" (1.575 m), weight 195 lb 9.6 oz (88.7 kg), SpO2 92 %. Gen: Patient is in no acute distress. Lungs: CTAB. Heart: RRR. Abd: S, NT, ND, BS present. Exremities: Warm.      Recent Results (from the past 12 hour(s))   CBC with Auto Differential    Collection Time: 23  4:12 AM   Result Value Ref Range    WBC 11.8 (H) 3.6 - 11.0 K/uL    RBC 2.66 (L) 3.80 - 5.20 M/uL    Hemoglobin 8.6 (L) 11.5 - 16.0 g/dL    Hematocrit 26.2 (L) 35.0 - 47.0 %    MCV 98.5 80.0 - 99.0 FL    MCH 32.3 26.0 - 34.0 PG    MCHC 32.8 30.0 - 36.5 g/dL    RDW 14.1 11.5 - 14.5 %    Platelets 067 831 - 064 K/uL    MPV 10.7 8.9 - 12.9 FL    Nucleated RBCs 0.0 0  WBC    nRBC 0.00 0.00 - 0.01 K/uL    Neutrophils % 65 32 - 75 %    Lymphocytes % 15 12 - 49 %    Monocytes % 10 5 - 13 %    Eosinophils % 7 0 - 7 %    Basophils % 1 0 - 1 %    Immature Granulocytes
INTERNAL MEDICINE ATTENDING NOTE     Patient Name: Steven Bennett   : 1950   Admit: 2023      ASSESSMENT / PLAN    Sepsis ([address] due to UTI: She is on IV ceftriaxone. Atrial fibrillation with RVR: Cardiology, Dr. Mckinley Acuña, following. She will stay off sotalol for now, given renal failure. Started on amiodarone. Anticoagulation with Xarelto. Encephalopathy: Pt's daughter requests psych consult. SAMUEL: IVF. Watch labs. Mild hypercalcemia:   Poor oral intake: Nutrition consult  Hypokalemia: Replete as needed. Hypomagnesemia: Repleted. Dystrophic nails: Consult podiatry as requested. Code: Full code. Angel Olmos MD, FACP  Best contact is via pager by hospital  at [phone]. I am also back on [employer]PerfectGift. SUBJECTIVE:   Ms. Steven Bennett was seen by me today during rounds. At this time, she is resting comfortably. The patient has no new complaints today. ROS otherwise unremarkable except as noted elsewhere. OBJECTIVE:   Blood pressure (!) 125/57, pulse 86, temperature 97.6 °F (36.4 °C), temperature source Oral, resp. rate 12, height 5' 2.01\" (1.575 m), weight 195 lb 9.6 oz (88.7 kg), SpO2 94 %. Gen: Patient is in no acute distress. Lungs: CTAB. Heart: RRR. Abd: S, NT, ND, BS present. Exremities: Warm.      Recent Results (from the past 12 hour(s))   CBC with Auto Differential    Collection Time: 23  4:33 AM   Result Value Ref Range    WBC 11.3 (H) 3.6 - 11.0 K/uL    RBC 2.73 (L) 3.80 - 5.20 M/uL    Hemoglobin 8.5 (L) 11.5 - 16.0 g/dL    Hematocrit 27.0 (L) 35.0 - 47.0 %    MCV 98.9 80.0 - 99.0 FL    MCH 31.1 26.0 - 34.0 PG    MCHC 31.5 30.0 - 36.5 g/dL    RDW 14.8 (H) 11.5 - 14.5 %    Platelets 711 544 - 896 K/uL    MPV 10.4 8.9 - 12.9 FL    Nucleated RBCs 0.0 0  WBC    nRBC 0.00 0.00 - 0.01 K/uL    Neutrophils % 64 32 - 75 %    Lymphocytes % 16 12 - 49 %    Monocytes % 11 5 - 13 %    Eosinophils % 7 0 - 7 %    Basophils % 1 0 - 1 %
James Score 13. All of the following interventions have been implemented to prevent pressure injury:    SKIN ASSESSMENT (S)  Dual skin assessment completed at shift change: Yes  Name of second RN who completed Dual Skin Assessment: Etha,RN  Picture of wound uploaded to EMR: Yes  Venelex ordered and given per protocol: Yes  Wound care consulted if wounds present: Yes    SURFACE (S)  [place] air pump or specialty bed ordered: No  Type of bed: [place] (need air pump)  Only white chux used with specialty surfaces (no green chux used): Yes  Waffle cushion used for chair positioning: NA    KEEP MOVING (K)  Mobility status (Bedrest, Chairbound, UWA x 1 assist , 2 assist, Max assist): Bedrest  Q2 hour turns documented: Yes  Refusals to turn and education provided documented: NA  Device used to float heels: Heels Up  PT/OT consulted:     INCONTINENCE (I)  Incontinence status assessed Q2 hours: Yes  External catheter in use: FIGUEROA  Barrier cream in use:  Zinc    NUTRITION (N)  I/O's documented every 8 hours: Yes  Oral supplements ordered if appropriate: NA  Nutrition services consulted: No    All concerns about new DTI's must be escalated directly to attending MD, charge nurse, [facility] [address] and nurse director.
James Score 14. All of the following interventions have been implemented to prevent pressure injury:    SKIN ASSESSMENT (S)  Dual skin assessment completed at shift change: Yes  Name of second RN who completed Dual Skin Assessment: Lesley Green RN  Picture of wound uploaded to EMR: Yes  Venelex ordered and given per protocol: Yes  Wound care consulted if wounds present: Yes    SURFACE (S)  [place] air pump or specialty bed ordered: Yes  Type of bed:   Only white chux used with specialty surfaces (no green chux used): NA  Waffle cushion used for chair positioning: NA    KEEP MOVING (K)  Mobility status (Bedrest, Chairbound, UWA x 1 assist , 2 assist, Max assist): Bedrest  Q2 hour turns documented: Yes  Refusals to turn and education provided documented: NA  Device used to float heels: Heel pad  PT/OT consulted: Yes    INCONTINENCE (I)  Incontinence status assessed Q2 hours: Yes  External catheter in use: Yes  Barrier cream in use: Zinc    NUTRITION (N)  I/O's documented every 8 hours: Yes  Oral supplements ordered if appropriate: Yes  Nutrition services consulted: Yes    All concerns about new DTI's must be escalated directly to attending MD, charge nurse, [address] and nurse director.
James Score 14. All of the following interventions have been implemented to prevent pressure injury:    SKIN ASSESSMENT (S)  Dual skin assessment completed at shift change: Yes  Name of second RN who completed Dual Skin Assessment: Rashmi SZYMANSKI  Picture of wound uploaded to EMR: No  Venelex ordered and given per protocol: Yes  Wound care consulted if wounds present: Yes    SURFACE (S)  [place] air pump or specialty bed ordered: No  Type of bed:   Only white chux used with specialty surfaces (no green chux used): Yes  Waffle cushion used for chair positioning: No    KEEP MOVING (K)  Mobility status (Bedrest, Chairbound, UWA x 1 assist , 2 assist, Max assist): 2 assist  Q2 hour turns documented: Yes  Refusals to turn and education provided documented: NA  Device used to float heels: Blue foot elevation pillow  PT/OT consulted: Yes    INCONTINENCE (I)  Incontinence status assessed Q2 hours: Yes  External catheter in use: Yes  Barrier cream in use: Yes    NUTRITION (N)  I/O's documented every 8 hours: Yes  Oral supplements ordered if appropriate: Yes  Nutrition services consulted: Yes    All concerns about new DTI's must be escalated directly to attending MD, charge nurse, [address] and nurse director.
James Score 14. All of the following interventions have been implemented to prevent pressure injury:    SKIN ASSESSMENT (S)  Dual skin assessment completed at shift change: Yes  Name of second RN who completed Dual Skin Assessment: Sam foster  Picture of wound uploaded to EMR: Yes  Venelex ordered and given per protocol: Yes  Wound care consulted if wounds present: Yes    SURFACE (S)  Naseem air pump or specialty bed ordered: Yes  Type of bed:   Only white chux used with specialty surfaces (no green chux used): NA  Waffle cushion used for chair positioning: NA    KEEP MOVING (K)  Mobility status (Bedrest, Chairbound, UWA x 1 assist , 2 assist, Max assist): Max  Q2 hour turns documented: Yes  Refusals to turn and education provided documented: NA  Device used to float heels: Heels up  PT/OT consulted: Yes    INCONTINENCE (I)  Incontinence status assessed Q2 hours: Yes  External catheter in use: Versette  Barrier cream in use: Zinc    NUTRITION (N)  I/O's documented every 8 hours: Yes  Oral supplements ordered if appropriate: Yes  Nutrition services consulted: Yes    All concerns about new DTI's must be escalated directly to attending MD, charge nurse, [address] and nurse director.
James Score 15. All of the following interventions have been implemented to prevent pressure injury:    SKIN ASSESSMENT (S)  Dual skin assessment completed at shift change: Yes  Name of second RN who completed Dual Skin Assessment: NESSA Perez  Picture of wound uploaded to EMR: Yes  Venelex ordered and given per protocol: Yes  Wound care consulted if wounds present: Yes    SURFACE (S)  Naseem air pump or specialty bed ordered: Yes  Type of bed: [place]  Only white chux used with specialty surfaces (no green chux used): Yes  Waffle cushion used for chair positioning:     KEEP MOVING (K)  Mobility status (Bedrest, Chairbound, UWA x 1 assist , 2 assist, Max assist): Assist x2 with R.W  Q2 hour turns documented: Yes  Refusals to turn and education provided documented: NA  Device used to float heels: Blue pad  PT/OT consulted: Yes    INCONTINENCE (I)  Incontinence status assessed Q2 hours: Yes  External catheter in use: Purewick  Barrier cream in use: Zinc    NUTRITION (N)  I/O's documented every 8 hours: Yes  Oral supplements ordered if appropriate: Yes  Nutrition services consulted: Yes    All concerns about new DTI's must be escalated directly to attending MD, charge nurse, [address] and nurse director.
James Score 15. All of the following interventions have been implemented to prevent pressure injury:    SKIN ASSESSMENT (S)  Dual skin assessment completed at shift change: Yes  Name of second RN who completed Dual Skin Assessment: NESSA frances  Picture of wound uploaded to EMR: Yes  Venelex ordered and given per protocol: Yes  Wound care consulted if wounds present: Yes    SURFACE (S)  Naseem air pump or specialty bed ordered: Yes  Type of bed:   Only white chux used with specialty surfaces (no green chux used): Yes  Waffle cushion used for chair positioning: No    KEEP MOVING (K)  Mobility status (Bedrest, Chairbound, UWA x 1 assist , 2 assist, Max assist): 2 assist  Q2 hour turns documented: Yes  Refusals to turn and education provided documented: NA  Device used to float heels: Heels up  PT/OT consulted: Yes    INCONTINENCE (I)  Incontinence status assessed Q2 hours: Yes  External catheter in use: Yes  Barrier cream in use: Yes    NUTRITION (N)  I/O's documented every 8 hours: Yes  Oral supplements ordered if appropriate: Yes  Nutrition services consulted: Yes    All concerns about new DTI's must be escalated directly to attending MD, charge nurse, [address] and nurse director.
James Score 15. All of the following interventions have been implemented to prevent pressure injury:    SKIN ASSESSMENT (S)  Dual skin assessment completed at shift change: Yes  Name of second RN who completed Dual Skin Assessment: Rashmi SZYMANSKI  Picture of wound uploaded to EMR: Yes  Venelex ordered and given per protocol: Yes  Wound care consulted if wounds present: Yes    SURFACE (S)  Naseem air pump or specialty bed ordered: NA  Type of bed:   Only white chux used with specialty surfaces (no green chux used): Yes  Waffle cushion used for chair positioning: No    KEEP MOVING (K)  Mobility status (Bedrest, Chairbound, UWA x 1 assist , 2 assist, Max assist): 1-2 assist with walker  Q2 hour turns documented: Yes  Refusals to turn and education provided documented: NA  Device used to float heels: blue rectangular pillow in bed, foot rest in chair  PT/OT consulted: Yes    INCONTINENCE (I)  Incontinence status assessed Q2 hours: Yes  External catheter in use: Yes  Barrier cream in use: Desitin    NUTRITION (N)  I/O's documented every 8 hours: Yes  Oral supplements ordered if appropriate: Yes  Nutrition services consulted: NA    All concerns about new DTI's must be escalated directly to attending MD, charge nurse, [address] and nurse director.
Patient discharged to [facility] via     wheelchair with hoe to [facility]. No acute distress noted.
Patient is clear from a CM standpoint. Transition of Care Plan: [address] [place] [address],[address] Floor SNF. RUR: 14% (low RUR)  Prior Level of Functioning: Independent  Disposition: [address] [place] [address],[address] Floor SNF. If SNF or IPR: Date FOC offered: 6/19/2023  Date FOC received: 6/20/2023. Accepting facility: [address] [place] [address] Floor SNF. Date authorization started with reference number: 6/20/2023  Date authorization received and expires: 6/21/2023-6/23/2023 (NRD), [facility] Case #: [id number]. Follow up appointments: defer to SNF  DME needed: defer to SNF  Trans[facility] at discharge: [facility] at [facility] pm, to [facility], bed 101A nursing call report to  and ask for the [facility]. [facility] paperwork placed on bedside chart. IM/IMM Medicare/ letter given: 2nd IM received 6/21/2023. Is patient a  and connected with VA? No.              If yes, was [employer] transfer form completed and VA notified? N/A  Caregiver Contact: Jennifer Christen - [phone]  Discharge Caregiver contacted prior to discharge? Aware of pending discharge today. Care Conference needed? No.  Barriers to discharge: None. 18 - CM contacted attending via [employer] to determine if transportation should remain for 1pm today, [employer] message from yesterday was acknowledged. CM contacted [address] [place] [address] via Link to determine what bed patient would be transporting into. CM notified patient's DTR regarding discharge for today pending medical clearance/discharge order from attending; she is agreeable to discharge to [address] [place] [address] today at 1pm with [facility].    7862 - CM received update from Unit Director regarding Dr. Aline Antonio discharging patient today. CM notified Roma Patel regarding this. [facility] contacted to determine if transport is still set for 1pm.  [facility] confirmed transportation for 1pm.    8138 - Primary RN notified of discharge plan at 1pm to [address] [place] [address],[facility] Floor. 1030 - Bed info confirmed with [address] [place] [address] Floor SNF and DTR notified regarding bed # and transport time.   CM will try to
See discharge summary
2. 01\")  Ideal Body Weight (IBW): 110 lbs (50 kg)  \\  Current Body Weight: 195 lb 8.8 oz (88.7 kg), 177.8 % IBW. Weight Source: Bed Scale  Current BMI (kg/m2): 35.8  BMI Categories: Overweight (BMI 25.0-29. 9)    Estimated Daily Nutrient Needs:  Energy Requirements Based On: Formula  Weight Used for Energy Requirements: Current  Energy (kcal/day): 1763 (BMR x 1.3 AF)  Weight Used for Protein Requirements: Current  Protein (g/day): 115 g/day (1.3 g/kg)  Method Used for Fluid Requirements: Other (Comment)  Fluid (ml/day): per MD    Nutrition Diagnosis:   Inadequate protein-energy intake related to increase demand for energy/nutrients as evidenced by wounds    Nutrition Interventions:   Food and/or Nutrient Delivery: Continue Current Diet, Continue Oral Nutrition Supplement  Nutrition Education/Counseling: No recommendation at this time  Coordination of Nutrition Care: Continue to monitor while inpatient       Goals:     Goals: PO intake 75% or greater, by next RD assessment       Nutrition Monitoring and Evaluation:   Behavioral-Environmental Outcomes: None Identified  Food/Nutrient Intake Outcomes: Food and Nutrient Intake, Supplement Intake  Physical Signs/Symptoms Outcomes: Weight, Biochemical Data, Nausea or Vomiting    Discharge Planning:    Continue Oral Nutrition Supplement, Continue current diet     Valarie Garza  Contact: [phone]
regimen: YES    Skin:     Interventions: float heels, increase time out of bed, and PT/OT consult    Patient Safety:  Fall Score:    Interventions: bed/chair alarm, assistive device (walker, cane.  etc), gripper socks, pt to call before getting OOB, and stay with me (per policy)       Length of Stay:  Expected LOS: 8  Actual LOS: 8      Iris Harrington RN
pink, no xanthelasma. Unlabored, clear to auscultation bilaterally, symmetric air movement. Regular rate and rhythm, no murmur. +peripheral edema. Palpable radial pulses bilaterally. Abdomen, soft, nontender, nondistended. Extremities without cyanosis or clubbing. Muscle tone and bulk normal for age. Skin warm and dry. Neuro grossly nonfocal.  No tremor. Sleeping. CARDIOGRAPHICS and STUDIES, I reviewed:    Telemetry:  SR with PAC's, nonsustained atrial tachycardia. Has multiple salvos of this. ECG from ER:  Reviewed. Labs:  No results for input(s): CPK, CKMB in the last 72 hours. Invalid input(s): CPKMB, CKNDX, TROIQ  Lab Results   Component Value Date/Time    CHOL 154 10/20/2022 12:04 PM    HDL 58 10/20/2022 12:04 PM     No results for input(s): INR, APTT in the last 72 hours. Invalid input(s): PTP     Recent Labs     06/16/23  1751 06/17/23  0407 06/17/23  1712 06/18/23  0335 06/18/23  1420 06/19/23  0259    145   < > 149* 148* 145   K 3.5 2.8*   < > 3.3* 3.8 2.7*   CL 99 102   < > 110* 109* 104   CO2 24 28   < > 31 31 36*   * 95*   < > 70* 61* 46*   PHOS  --  6.4*  --   --   --  1.6*   WBC 17.8* 16.8*  --   --   --  13.3*   HGB 8.9* 8.6*  --   --   --  8.5*   HCT 25.9* 25.8*  --   --   --  26.8*    234  --   --   --  187    < > = values in this interval not displayed. No results for input(s): TP, ALB, GLOB, GGT, AML in the last 72 hours. Invalid input(s): SGOT, GPT, AP, TBIL, AMYP, LPSE, HLPSE    Estimated Creatinine Clearance: 60 mL/min (based on SCr of 0.89 mg/dL).       Linda Machado MD 06/19/23 5:02 PM

## 2023-06-30 ENCOUNTER — TELEPHONE (OUTPATIENT)
Age: 73
End: 2023-06-30

## 2023-07-05 ENCOUNTER — TELEPHONE (OUTPATIENT)
Age: 73
End: 2023-07-05

## 2023-07-05 NOTE — TELEPHONE ENCOUNTER
Evelia with [facility] notified that PCP will follow for [address] to fax over any information received from [facility] for provider review

## 2023-07-05 NOTE — TELEPHONE ENCOUNTER
[facility] is calling to confirm if Dr. Swapnil Rosa will Follow patient for [facility] Orders for patient that Discharged yesterday, 7/4/23 from [facility] & has upcoming [facility] HFU appt with Dr. Swapnil Rosa. Patient Last Only seen as NP 10/20/22. Please call to advise at [phone] Request Intake.      Thank you

## 2023-07-12 ENCOUNTER — APPOINTMENT (OUTPATIENT)
Facility: HOSPITAL | Age: 73
DRG: 682 | End: 2023-07-12
Payer: MEDICARE

## 2023-07-12 ENCOUNTER — HOSPITAL ENCOUNTER (INPATIENT)
Facility: HOSPITAL | Age: 73
LOS: 7 days | Discharge: NURSING, REHAB OR LONG-TERM CARE | DRG: 682 | End: 2023-07-19
Attending: EMERGENCY MEDICINE | Admitting: INTERNAL MEDICINE
Payer: MEDICARE

## 2023-07-12 DIAGNOSIS — N17.9 AKI (ACUTE KIDNEY INJURY) (HCC): ICD-10-CM

## 2023-07-12 DIAGNOSIS — R60.0 EDEMA OF BOTH LEGS: ICD-10-CM

## 2023-07-12 DIAGNOSIS — R60.0 BILATERAL LOWER EXTREMITY EDEMA: ICD-10-CM

## 2023-07-12 DIAGNOSIS — E87.1 HYPONATREMIA: ICD-10-CM

## 2023-07-12 DIAGNOSIS — Z78.9 UNABLE TO CARE FOR SELF: ICD-10-CM

## 2023-07-12 DIAGNOSIS — E86.0 DEHYDRATION: Primary | ICD-10-CM

## 2023-07-12 LAB
ANION GAP SERPL CALC-SCNC: 13 MMOL/L (ref 5–15)
APPEARANCE UR: CLEAR
APPEARANCE UR: CLEAR
BACTERIA URNS QL MICRO: NEGATIVE /HPF
BACTERIA URNS QL MICRO: NEGATIVE /HPF
BASOPHILS # BLD: 0 K/UL (ref 0–0.1)
BASOPHILS NFR BLD: 0 % (ref 0–1)
BILIRUB UR QL: NEGATIVE
BILIRUB UR QL: NEGATIVE
BUN SERPL-MCNC: 53 MG/DL (ref 6–20)
BUN/CREAT SERPL: 17 (ref 12–20)
CALCIUM SERPL-MCNC: 7.1 MG/DL (ref 8.5–10.1)
CHLORIDE SERPL-SCNC: 88 MMOL/L (ref 97–108)
CO2 SERPL-SCNC: 19 MMOL/L (ref 21–32)
COLOR UR: ABNORMAL
COLOR UR: ABNORMAL
CREAT SERPL-MCNC: 3.1 MG/DL (ref 0.55–1.02)
DIFFERENTIAL METHOD BLD: ABNORMAL
ECHO BSA: 1.99 M2
EOSINOPHIL # BLD: 0.1 K/UL (ref 0–0.4)
EOSINOPHIL NFR BLD: 1 % (ref 0–7)
EPITH CASTS URNS QL MICRO: ABNORMAL /LPF
EPITH CASTS URNS QL MICRO: ABNORMAL /LPF
ERYTHROCYTE [DISTWIDTH] IN BLOOD BY AUTOMATED COUNT: 15.5 % (ref 11.5–14.5)
GLUCOSE SERPL-MCNC: 98 MG/DL (ref 65–100)
GLUCOSE UR STRIP.AUTO-MCNC: NEGATIVE MG/DL
GLUCOSE UR STRIP.AUTO-MCNC: NEGATIVE MG/DL
HCT VFR BLD AUTO: 31.1 % (ref 35–47)
HGB BLD-MCNC: 10.5 G/DL (ref 11.5–16)
HGB UR QL STRIP: ABNORMAL
HGB UR QL STRIP: ABNORMAL
IMM GRANULOCYTES # BLD AUTO: 0.1 K/UL (ref 0–0.04)
IMM GRANULOCYTES NFR BLD AUTO: 1 % (ref 0–0.5)
KETONES UR QL STRIP.AUTO: NEGATIVE MG/DL
KETONES UR QL STRIP.AUTO: NEGATIVE MG/DL
LACTATE SERPL-SCNC: 1.1 MMOL/L (ref 0.4–2)
LEUKOCYTE ESTERASE UR QL STRIP.AUTO: ABNORMAL
LEUKOCYTE ESTERASE UR QL STRIP.AUTO: NEGATIVE
LYMPHOCYTES # BLD: 0.9 K/UL (ref 0.8–3.5)
LYMPHOCYTES NFR BLD: 6 % (ref 12–49)
MCH RBC QN AUTO: 32.3 PG (ref 26–34)
MCHC RBC AUTO-ENTMCNC: 33.8 G/DL (ref 30–36.5)
MCV RBC AUTO: 95.7 FL (ref 80–99)
MONOCYTES # BLD: 1 K/UL (ref 0–1)
MONOCYTES NFR BLD: 7 % (ref 5–13)
NEUTS SEG # BLD: 12 K/UL (ref 1.8–8)
NEUTS SEG NFR BLD: 85 % (ref 32–75)
NITRITE UR QL STRIP.AUTO: NEGATIVE
NITRITE UR QL STRIP.AUTO: NEGATIVE
NRBC # BLD: 0 K/UL (ref 0–0.01)
NRBC BLD-RTO: 0 PER 100 WBC
PH UR STRIP: 5.5 (ref 5–8)
PH UR STRIP: 5.5 (ref 5–8)
PLATELET # BLD AUTO: 286 K/UL (ref 150–400)
PMV BLD AUTO: 10 FL (ref 8.9–12.9)
POTASSIUM SERPL-SCNC: 3.8 MMOL/L (ref 3.5–5.1)
PROCALCITONIN SERPL-MCNC: 0.13 NG/ML
PROT UR STRIP-MCNC: NEGATIVE MG/DL
PROT UR STRIP-MCNC: NEGATIVE MG/DL
RBC # BLD AUTO: 3.25 M/UL (ref 3.8–5.2)
RBC #/AREA URNS HPF: ABNORMAL /HPF (ref 0–5)
RBC #/AREA URNS HPF: ABNORMAL /HPF (ref 0–5)
SODIUM SERPL-SCNC: 120 MMOL/L (ref 136–145)
SP GR UR REFRACTOMETRY: 1.01
SP GR UR REFRACTOMETRY: 1.01
URINE CULTURE IF INDICATED: ABNORMAL
URINE CULTURE IF INDICATED: ABNORMAL
UROBILINOGEN UR QL STRIP.AUTO: 0.2 EU/DL (ref 0.2–1)
UROBILINOGEN UR QL STRIP.AUTO: 1 EU/DL (ref 0.2–1)
WBC # BLD AUTO: 14.1 K/UL (ref 3.6–11)
WBC URNS QL MICRO: ABNORMAL /HPF (ref 0–4)
WBC URNS QL MICRO: ABNORMAL /HPF (ref 0–4)

## 2023-07-12 PROCEDURE — 85025 COMPLETE CBC W/AUTO DIFF WBC: CPT

## 2023-07-12 PROCEDURE — 6370000000 HC RX 637 (ALT 250 FOR IP): Performed by: NURSE PRACTITIONER

## 2023-07-12 PROCEDURE — 81001 URINALYSIS AUTO W/SCOPE: CPT

## 2023-07-12 PROCEDURE — 99285 EMERGENCY DEPT VISIT HI MDM: CPT

## 2023-07-12 PROCEDURE — 87070 CULTURE OTHR SPECIMN AEROBIC: CPT

## 2023-07-12 PROCEDURE — 36415 COLL VENOUS BLD VENIPUNCTURE: CPT

## 2023-07-12 PROCEDURE — 6360000002 HC RX W HCPCS: Performed by: NURSE PRACTITIONER

## 2023-07-12 PROCEDURE — 87040 BLOOD CULTURE FOR BACTERIA: CPT

## 2023-07-12 PROCEDURE — 2580000003 HC RX 258: Performed by: NURSE PRACTITIONER

## 2023-07-12 PROCEDURE — 72100 X-RAY EXAM L-S SPINE 2/3 VWS: CPT

## 2023-07-12 PROCEDURE — 71045 X-RAY EXAM CHEST 1 VIEW: CPT

## 2023-07-12 PROCEDURE — 80048 BASIC METABOLIC PNL TOTAL CA: CPT

## 2023-07-12 PROCEDURE — 2500000003 HC RX 250 WO HCPCS: Performed by: NURSE PRACTITIONER

## 2023-07-12 PROCEDURE — 83605 ASSAY OF LACTIC ACID: CPT

## 2023-07-12 PROCEDURE — 93005 ELECTROCARDIOGRAM TRACING: CPT | Performed by: EMERGENCY MEDICINE

## 2023-07-12 PROCEDURE — 87075 CULTR BACTERIA EXCEPT BLOOD: CPT

## 2023-07-12 PROCEDURE — 93970 EXTREMITY STUDY: CPT

## 2023-07-12 PROCEDURE — 84145 PROCALCITONIN (PCT): CPT

## 2023-07-12 PROCEDURE — 2060000000 HC ICU INTERMEDIATE R&B

## 2023-07-12 PROCEDURE — 6370000000 HC RX 637 (ALT 250 FOR IP): Performed by: EMERGENCY MEDICINE

## 2023-07-12 PROCEDURE — 87205 SMEAR GRAM STAIN: CPT

## 2023-07-12 PROCEDURE — 87077 CULTURE AEROBIC IDENTIFY: CPT

## 2023-07-12 PROCEDURE — 87186 SC STD MICRODIL/AGAR DIL: CPT

## 2023-07-12 RX ORDER — MIDODRINE HYDROCHLORIDE 5 MG/1
5 TABLET ORAL 2 TIMES DAILY WITH MEALS
Status: DISCONTINUED | OUTPATIENT
Start: 2023-07-12 | End: 2023-07-19 | Stop reason: HOSPADM

## 2023-07-12 RX ORDER — SODIUM CHLORIDE 9 MG/ML
INJECTION, SOLUTION INTRAVENOUS CONTINUOUS
Status: DISCONTINUED | OUTPATIENT
Start: 2023-07-12 | End: 2023-07-19 | Stop reason: HOSPADM

## 2023-07-12 RX ORDER — ATORVASTATIN CALCIUM 40 MG/1
40 TABLET, FILM COATED ORAL DAILY
Status: DISCONTINUED | OUTPATIENT
Start: 2023-07-12 | End: 2023-07-19 | Stop reason: HOSPADM

## 2023-07-12 RX ORDER — ACETAMINOPHEN 500 MG
1000 TABLET ORAL
Status: COMPLETED | OUTPATIENT
Start: 2023-07-12 | End: 2023-07-12

## 2023-07-12 RX ORDER — PANTOPRAZOLE SODIUM 40 MG/1
40 TABLET, DELAYED RELEASE ORAL
Status: DISCONTINUED | OUTPATIENT
Start: 2023-07-13 | End: 2023-07-19 | Stop reason: HOSPADM

## 2023-07-12 RX ORDER — AMIODARONE HYDROCHLORIDE 200 MG/1
400 TABLET ORAL DAILY
Status: DISCONTINUED | OUTPATIENT
Start: 2023-07-13 | End: 2023-07-19 | Stop reason: HOSPADM

## 2023-07-12 RX ORDER — CALCIUM GLUCONATE 20 MG/ML
1000 INJECTION, SOLUTION INTRAVENOUS ONCE
Status: COMPLETED | OUTPATIENT
Start: 2023-07-12 | End: 2023-07-12

## 2023-07-12 RX ADMIN — ACETAMINOPHEN 1000 MG: 500 TABLET ORAL at 13:52

## 2023-07-12 RX ADMIN — SODIUM CHLORIDE: 9 INJECTION, SOLUTION INTRAVENOUS at 18:10

## 2023-07-12 RX ADMIN — PIPERACILLIN AND TAZOBACTAM 3375 MG: 3; .375 INJECTION, POWDER, LYOPHILIZED, FOR SOLUTION INTRAVENOUS at 16:58

## 2023-07-12 RX ADMIN — CALCIUM GLUCONATE 1000 MG: 20 INJECTION, SOLUTION INTRAVENOUS at 17:26

## 2023-07-12 RX ADMIN — ATORVASTATIN CALCIUM 40 MG: 40 TABLET, FILM COATED ORAL at 17:00

## 2023-07-12 RX ADMIN — MIDODRINE HYDROCHLORIDE 5 MG: 5 TABLET ORAL at 17:00

## 2023-07-12 RX ADMIN — RIVAROXABAN 15 MG: 15 TABLET, FILM COATED ORAL at 18:10

## 2023-07-12 RX ADMIN — PIPERACILLIN AND TAZOBACTAM 3375 MG: 3; .375 INJECTION, POWDER, LYOPHILIZED, FOR SOLUTION INTRAVENOUS at 23:13

## 2023-07-12 ASSESSMENT — PAIN SCALES - GENERAL
PAINLEVEL_OUTOF10: 4
PAINLEVEL_OUTOF10: 4

## 2023-07-12 ASSESSMENT — PAIN DESCRIPTION - LOCATION
LOCATION: BACK
LOCATION: BACK

## 2023-07-12 ASSESSMENT — PAIN - FUNCTIONAL ASSESSMENT: PAIN_FUNCTIONAL_ASSESSMENT: NONE - DENIES PAIN

## 2023-07-13 LAB
ANION GAP SERPL CALC-SCNC: 12 MMOL/L (ref 5–15)
ANION GAP SERPL CALC-SCNC: 12 MMOL/L (ref 5–15)
ANION GAP SERPL CALC-SCNC: 13 MMOL/L (ref 5–15)
BASOPHILS # BLD: 0.1 K/UL (ref 0–0.1)
BASOPHILS NFR BLD: 1 % (ref 0–1)
BUN SERPL-MCNC: 49 MG/DL (ref 6–20)
BUN SERPL-MCNC: 49 MG/DL (ref 6–20)
BUN SERPL-MCNC: 54 MG/DL (ref 6–20)
BUN/CREAT SERPL: 23 (ref 12–20)
BUN/CREAT SERPL: 23 (ref 12–20)
BUN/CREAT SERPL: 25 (ref 12–20)
CALCIUM SERPL-MCNC: 6.8 MG/DL (ref 8.5–10.1)
CALCIUM SERPL-MCNC: 7.7 MG/DL (ref 8.5–10.1)
CALCIUM SERPL-MCNC: 7.9 MG/DL (ref 8.5–10.1)
CHLORIDE SERPL-SCNC: 92 MMOL/L (ref 97–108)
CHLORIDE SERPL-SCNC: 94 MMOL/L (ref 97–108)
CHLORIDE SERPL-SCNC: 98 MMOL/L (ref 97–108)
CO2 SERPL-SCNC: 18 MMOL/L (ref 21–32)
CO2 SERPL-SCNC: 19 MMOL/L (ref 21–32)
CO2 SERPL-SCNC: 19 MMOL/L (ref 21–32)
CREAT SERPL-MCNC: 1.97 MG/DL (ref 0.55–1.02)
CREAT SERPL-MCNC: 2.17 MG/DL (ref 0.55–1.02)
CREAT SERPL-MCNC: 2.36 MG/DL (ref 0.55–1.02)
DIFFERENTIAL METHOD BLD: ABNORMAL
EKG ATRIAL RATE: 69 BPM
EKG DIAGNOSIS: NORMAL
EKG P AXIS: 99 DEGREES
EKG P-R INTERVAL: 224 MS
EKG Q-T INTERVAL: 430 MS
EKG QRS DURATION: 80 MS
EKG QTC CALCULATION (BAZETT): 460 MS
EKG R AXIS: 6 DEGREES
EKG T AXIS: 25 DEGREES
EKG VENTRICULAR RATE: 69 BPM
EOSINOPHIL # BLD: 0.6 K/UL (ref 0–0.4)
EOSINOPHIL NFR BLD: 7 % (ref 0–7)
ERYTHROCYTE [DISTWIDTH] IN BLOOD BY AUTOMATED COUNT: 15.7 % (ref 11.5–14.5)
GLUCOSE SERPL-MCNC: 75 MG/DL (ref 65–100)
GLUCOSE SERPL-MCNC: 89 MG/DL (ref 65–100)
GLUCOSE SERPL-MCNC: 95 MG/DL (ref 65–100)
HCT VFR BLD AUTO: 25.9 % (ref 35–47)
HGB BLD-MCNC: 8.6 G/DL (ref 11.5–16)
IMM GRANULOCYTES # BLD AUTO: 0.1 K/UL (ref 0–0.04)
IMM GRANULOCYTES NFR BLD AUTO: 1 % (ref 0–0.5)
LYMPHOCYTES # BLD: 1.4 K/UL (ref 0.8–3.5)
LYMPHOCYTES NFR BLD: 15 % (ref 12–49)
MCH RBC QN AUTO: 31.9 PG (ref 26–34)
MCHC RBC AUTO-ENTMCNC: 33.2 G/DL (ref 30–36.5)
MCV RBC AUTO: 95.9 FL (ref 80–99)
MONOCYTES # BLD: 0.9 K/UL (ref 0–1)
MONOCYTES NFR BLD: 10 % (ref 5–13)
NEUTS SEG # BLD: 6.4 K/UL (ref 1.8–8)
NEUTS SEG NFR BLD: 66 % (ref 32–75)
NRBC # BLD: 0 K/UL (ref 0–0.01)
NRBC BLD-RTO: 0 PER 100 WBC
PLATELET # BLD AUTO: 286 K/UL (ref 150–400)
PMV BLD AUTO: 11.1 FL (ref 8.9–12.9)
POTASSIUM SERPL-SCNC: 3.2 MMOL/L (ref 3.5–5.1)
POTASSIUM SERPL-SCNC: 3.4 MMOL/L (ref 3.5–5.1)
POTASSIUM SERPL-SCNC: 4 MMOL/L (ref 3.5–5.1)
RBC # BLD AUTO: 2.7 M/UL (ref 3.8–5.2)
SODIUM SERPL-SCNC: 124 MMOL/L (ref 136–145)
SODIUM SERPL-SCNC: 125 MMOL/L (ref 136–145)
SODIUM SERPL-SCNC: 128 MMOL/L (ref 136–145)
WBC # BLD AUTO: 9.5 K/UL (ref 3.6–11)

## 2023-07-13 PROCEDURE — 99233 SBSQ HOSP IP/OBS HIGH 50: CPT | Performed by: INTERNAL MEDICINE

## 2023-07-13 PROCEDURE — 97162 PT EVAL MOD COMPLEX 30 MIN: CPT

## 2023-07-13 PROCEDURE — 6360000002 HC RX W HCPCS: Performed by: NURSE PRACTITIONER

## 2023-07-13 PROCEDURE — 2580000003 HC RX 258: Performed by: NURSE PRACTITIONER

## 2023-07-13 PROCEDURE — 80048 BASIC METABOLIC PNL TOTAL CA: CPT

## 2023-07-13 PROCEDURE — 97535 SELF CARE MNGMENT TRAINING: CPT | Performed by: OCCUPATIONAL THERAPIST

## 2023-07-13 PROCEDURE — 2500000003 HC RX 250 WO HCPCS: Performed by: PHYSICIAN ASSISTANT

## 2023-07-13 PROCEDURE — 2580000003 HC RX 258: Performed by: PHYSICIAN ASSISTANT

## 2023-07-13 PROCEDURE — 2060000000 HC ICU INTERMEDIATE R&B

## 2023-07-13 PROCEDURE — 97530 THERAPEUTIC ACTIVITIES: CPT

## 2023-07-13 PROCEDURE — 97165 OT EVAL LOW COMPLEX 30 MIN: CPT | Performed by: OCCUPATIONAL THERAPIST

## 2023-07-13 PROCEDURE — 6370000000 HC RX 637 (ALT 250 FOR IP): Performed by: INTERNAL MEDICINE

## 2023-07-13 PROCEDURE — 85025 COMPLETE CBC W/AUTO DIFF WBC: CPT

## 2023-07-13 PROCEDURE — 36415 COLL VENOUS BLD VENIPUNCTURE: CPT

## 2023-07-13 PROCEDURE — 6370000000 HC RX 637 (ALT 250 FOR IP): Performed by: NURSE PRACTITIONER

## 2023-07-13 RX ORDER — ACETAMINOPHEN 325 MG/1
650 TABLET ORAL EVERY 4 HOURS PRN
Status: DISCONTINUED | OUTPATIENT
Start: 2023-07-13 | End: 2023-07-19 | Stop reason: HOSPADM

## 2023-07-13 RX ORDER — PAROXETINE HYDROCHLORIDE 20 MG/1
10 TABLET, FILM COATED ORAL DAILY
Status: DISCONTINUED | OUTPATIENT
Start: 2023-07-13 | End: 2023-07-19 | Stop reason: HOSPADM

## 2023-07-13 RX ORDER — SODIUM CHLORIDE, SODIUM LACTATE, POTASSIUM CHLORIDE, AND CALCIUM CHLORIDE .6; .31; .03; .02 G/100ML; G/100ML; G/100ML; G/100ML
500 INJECTION, SOLUTION INTRAVENOUS ONCE
Status: COMPLETED | OUTPATIENT
Start: 2023-07-13 | End: 2023-07-13

## 2023-07-13 RX ORDER — POTASSIUM CHLORIDE 750 MG/1
40 TABLET, FILM COATED, EXTENDED RELEASE ORAL ONCE
Status: COMPLETED | OUTPATIENT
Start: 2023-07-13 | End: 2023-07-13

## 2023-07-13 RX ORDER — CALCIUM GLUCONATE 20 MG/ML
1000 INJECTION, SOLUTION INTRAVENOUS ONCE
Status: COMPLETED | OUTPATIENT
Start: 2023-07-13 | End: 2023-07-13

## 2023-07-13 RX ADMIN — ATORVASTATIN CALCIUM 40 MG: 40 TABLET, FILM COATED ORAL at 08:38

## 2023-07-13 RX ADMIN — RIVAROXABAN 15 MG: 15 TABLET, FILM COATED ORAL at 17:38

## 2023-07-13 RX ADMIN — PIPERACILLIN AND TAZOBACTAM 3375 MG: 3; .375 INJECTION, POWDER, LYOPHILIZED, FOR SOLUTION INTRAVENOUS at 11:40

## 2023-07-13 RX ADMIN — ACETAMINOPHEN 650 MG: 325 TABLET ORAL at 18:30

## 2023-07-13 RX ADMIN — PAROXETINE HYDROCHLORIDE 10 MG: 20 TABLET, FILM COATED ORAL at 17:38

## 2023-07-13 RX ADMIN — MIDODRINE HYDROCHLORIDE 5 MG: 5 TABLET ORAL at 06:24

## 2023-07-13 RX ADMIN — SODIUM CHLORIDE: 9 INJECTION, SOLUTION INTRAVENOUS at 15:10

## 2023-07-13 RX ADMIN — SODIUM CHLORIDE, POTASSIUM CHLORIDE, SODIUM LACTATE AND CALCIUM CHLORIDE 500 ML: 600; 310; 30; 20 INJECTION, SOLUTION INTRAVENOUS at 01:19

## 2023-07-13 RX ADMIN — CALCIUM GLUCONATE 1000 MG: 20 INJECTION, SOLUTION INTRAVENOUS at 08:37

## 2023-07-13 RX ADMIN — ACETAMINOPHEN 650 MG: 325 TABLET ORAL at 13:11

## 2023-07-13 RX ADMIN — MIDODRINE HYDROCHLORIDE 5 MG: 5 TABLET ORAL at 17:38

## 2023-07-13 RX ADMIN — POTASSIUM CHLORIDE 40 MEQ: 750 TABLET, FILM COATED, EXTENDED RELEASE ORAL at 14:49

## 2023-07-13 RX ADMIN — PANTOPRAZOLE SODIUM 40 MG: 40 TABLET, DELAYED RELEASE ORAL at 06:24

## 2023-07-13 RX ADMIN — AMIODARONE HYDROCHLORIDE 400 MG: 200 TABLET ORAL at 08:38

## 2023-07-13 ASSESSMENT — PAIN DESCRIPTION - LOCATION
LOCATION: BACK
LOCATION: LEG;BACK

## 2023-07-13 ASSESSMENT — PAIN DESCRIPTION - DESCRIPTORS
DESCRIPTORS: ACHING
DESCRIPTORS: ACHING

## 2023-07-13 ASSESSMENT — PAIN DESCRIPTION - ORIENTATION
ORIENTATION: LOWER
ORIENTATION: RIGHT;LEFT

## 2023-07-13 ASSESSMENT — PAIN SCALES - GENERAL
PAINLEVEL_OUTOF10: 5
PAINLEVEL_OUTOF10: 6
PAINLEVEL_OUTOF10: 4

## 2023-07-14 LAB
ANION GAP SERPL CALC-SCNC: 11 MMOL/L (ref 5–15)
BASOPHILS # BLD: 0.1 K/UL (ref 0–0.1)
BASOPHILS NFR BLD: 1 % (ref 0–1)
BUN SERPL-MCNC: 47 MG/DL (ref 6–20)
BUN/CREAT SERPL: 31 (ref 12–20)
CALCIUM SERPL-MCNC: 8.3 MG/DL (ref 8.5–10.1)
CHLORIDE SERPL-SCNC: 100 MMOL/L (ref 97–108)
CO2 SERPL-SCNC: 19 MMOL/L (ref 21–32)
CREAT SERPL-MCNC: 1.53 MG/DL (ref 0.55–1.02)
DIFFERENTIAL METHOD BLD: ABNORMAL
EOSINOPHIL # BLD: 0.5 K/UL (ref 0–0.4)
EOSINOPHIL NFR BLD: 6 % (ref 0–7)
ERYTHROCYTE [DISTWIDTH] IN BLOOD BY AUTOMATED COUNT: 15.5 % (ref 11.5–14.5)
GLUCOSE SERPL-MCNC: 86 MG/DL (ref 65–100)
HCT VFR BLD AUTO: 26.3 % (ref 35–47)
HGB BLD-MCNC: 8.6 G/DL (ref 11.5–16)
IMM GRANULOCYTES # BLD AUTO: 0.1 K/UL (ref 0–0.04)
IMM GRANULOCYTES NFR BLD AUTO: 1 % (ref 0–0.5)
LYMPHOCYTES # BLD: 1.4 K/UL (ref 0.8–3.5)
LYMPHOCYTES NFR BLD: 17 % (ref 12–49)
MCH RBC QN AUTO: 32.7 PG (ref 26–34)
MCHC RBC AUTO-ENTMCNC: 32.7 G/DL (ref 30–36.5)
MCV RBC AUTO: 100 FL (ref 80–99)
MONOCYTES # BLD: 0.9 K/UL (ref 0–1)
MONOCYTES NFR BLD: 11 % (ref 5–13)
NEUTS SEG # BLD: 5.4 K/UL (ref 1.8–8)
NEUTS SEG NFR BLD: 65 % (ref 32–75)
NRBC # BLD: 0 K/UL (ref 0–0.01)
NRBC BLD-RTO: 0 PER 100 WBC
PLATELET # BLD AUTO: 246 K/UL (ref 150–400)
PMV BLD AUTO: 10.6 FL (ref 8.9–12.9)
POTASSIUM SERPL-SCNC: 3.4 MMOL/L (ref 3.5–5.1)
RBC # BLD AUTO: 2.63 M/UL (ref 3.8–5.2)
SODIUM SERPL-SCNC: 130 MMOL/L (ref 136–145)
WBC # BLD AUTO: 8.2 K/UL (ref 3.6–11)

## 2023-07-14 PROCEDURE — 6370000000 HC RX 637 (ALT 250 FOR IP): Performed by: INTERNAL MEDICINE

## 2023-07-14 PROCEDURE — 80048 BASIC METABOLIC PNL TOTAL CA: CPT

## 2023-07-14 PROCEDURE — 97530 THERAPEUTIC ACTIVITIES: CPT

## 2023-07-14 PROCEDURE — 97110 THERAPEUTIC EXERCISES: CPT

## 2023-07-14 PROCEDURE — 2580000003 HC RX 258: Performed by: NURSE PRACTITIONER

## 2023-07-14 PROCEDURE — 6360000002 HC RX W HCPCS: Performed by: NURSE PRACTITIONER

## 2023-07-14 PROCEDURE — 99233 SBSQ HOSP IP/OBS HIGH 50: CPT | Performed by: INTERNAL MEDICINE

## 2023-07-14 PROCEDURE — 85025 COMPLETE CBC W/AUTO DIFF WBC: CPT

## 2023-07-14 PROCEDURE — 2060000000 HC ICU INTERMEDIATE R&B

## 2023-07-14 PROCEDURE — 36415 COLL VENOUS BLD VENIPUNCTURE: CPT

## 2023-07-14 PROCEDURE — 6370000000 HC RX 637 (ALT 250 FOR IP): Performed by: NURSE PRACTITIONER

## 2023-07-14 RX ORDER — POTASSIUM CHLORIDE 20 MEQ/1
40 TABLET, EXTENDED RELEASE ORAL ONCE
Status: COMPLETED | OUTPATIENT
Start: 2023-07-14 | End: 2023-07-14

## 2023-07-14 RX ADMIN — PANTOPRAZOLE SODIUM 40 MG: 40 TABLET, DELAYED RELEASE ORAL at 09:11

## 2023-07-14 RX ADMIN — ATORVASTATIN CALCIUM 40 MG: 40 TABLET, FILM COATED ORAL at 09:11

## 2023-07-14 RX ADMIN — SODIUM CHLORIDE: 9 INJECTION, SOLUTION INTRAVENOUS at 04:56

## 2023-07-14 RX ADMIN — PIPERACILLIN AND TAZOBACTAM 3375 MG: 3; .375 INJECTION, POWDER, LYOPHILIZED, FOR SOLUTION INTRAVENOUS at 12:35

## 2023-07-14 RX ADMIN — POTASSIUM CHLORIDE 40 MEQ: 1500 TABLET, EXTENDED RELEASE ORAL at 12:35

## 2023-07-14 RX ADMIN — MIDODRINE HYDROCHLORIDE 5 MG: 5 TABLET ORAL at 17:28

## 2023-07-14 RX ADMIN — PIPERACILLIN AND TAZOBACTAM 3375 MG: 3; .375 INJECTION, POWDER, LYOPHILIZED, FOR SOLUTION INTRAVENOUS at 05:02

## 2023-07-14 RX ADMIN — RIVAROXABAN 15 MG: 15 TABLET, FILM COATED ORAL at 17:28

## 2023-07-14 RX ADMIN — MIDODRINE HYDROCHLORIDE 5 MG: 5 TABLET ORAL at 09:11

## 2023-07-14 RX ADMIN — PAROXETINE HYDROCHLORIDE 10 MG: 20 TABLET, FILM COATED ORAL at 09:11

## 2023-07-14 RX ADMIN — SODIUM CHLORIDE: 9 INJECTION, SOLUTION INTRAVENOUS at 23:44

## 2023-07-14 RX ADMIN — ACETAMINOPHEN 650 MG: 325 TABLET ORAL at 16:29

## 2023-07-14 RX ADMIN — ACETAMINOPHEN 650 MG: 325 TABLET ORAL at 23:48

## 2023-07-14 RX ADMIN — PIPERACILLIN AND TAZOBACTAM 3375 MG: 3; .375 INJECTION, POWDER, LYOPHILIZED, FOR SOLUTION INTRAVENOUS at 23:44

## 2023-07-14 RX ADMIN — ACETAMINOPHEN 650 MG: 325 TABLET ORAL at 09:11

## 2023-07-14 RX ADMIN — AMIODARONE HYDROCHLORIDE 400 MG: 200 TABLET ORAL at 09:11

## 2023-07-14 ASSESSMENT — PAIN DESCRIPTION - DESCRIPTORS
DESCRIPTORS: ACHING
DESCRIPTORS: ACHING

## 2023-07-14 ASSESSMENT — PAIN DESCRIPTION - LOCATION
LOCATION: BACK;LEG
LOCATION: BACK;LEG

## 2023-07-14 ASSESSMENT — PAIN SCALES - GENERAL: PAINLEVEL_OUTOF10: 5

## 2023-07-15 LAB
ANION GAP SERPL CALC-SCNC: 8 MMOL/L (ref 5–15)
BASOPHILS # BLD: 0.1 K/UL (ref 0–0.1)
BASOPHILS NFR BLD: 1 % (ref 0–1)
BUN SERPL-MCNC: 45 MG/DL (ref 6–20)
BUN/CREAT SERPL: 38 (ref 12–20)
CALCIUM SERPL-MCNC: 8.5 MG/DL (ref 8.5–10.1)
CHLORIDE SERPL-SCNC: 103 MMOL/L (ref 97–108)
CO2 SERPL-SCNC: 21 MMOL/L (ref 21–32)
CREAT SERPL-MCNC: 1.17 MG/DL (ref 0.55–1.02)
DIFFERENTIAL METHOD BLD: ABNORMAL
EOSINOPHIL # BLD: 0.6 K/UL (ref 0–0.4)
EOSINOPHIL NFR BLD: 6 % (ref 0–7)
ERYTHROCYTE [DISTWIDTH] IN BLOOD BY AUTOMATED COUNT: 15.9 % (ref 11.5–14.5)
GLUCOSE SERPL-MCNC: 91 MG/DL (ref 65–100)
HCT VFR BLD AUTO: 25.5 % (ref 35–47)
HGB BLD-MCNC: 8.3 G/DL (ref 11.5–16)
IMM GRANULOCYTES # BLD AUTO: 0.1 K/UL (ref 0–0.04)
IMM GRANULOCYTES NFR BLD AUTO: 1 % (ref 0–0.5)
LYMPHOCYTES # BLD: 1 K/UL (ref 0.8–3.5)
LYMPHOCYTES NFR BLD: 11 % (ref 12–49)
MCH RBC QN AUTO: 32.2 PG (ref 26–34)
MCHC RBC AUTO-ENTMCNC: 32.5 G/DL (ref 30–36.5)
MCV RBC AUTO: 98.8 FL (ref 80–99)
MONOCYTES # BLD: 0.9 K/UL (ref 0–1)
MONOCYTES NFR BLD: 10 % (ref 5–13)
NEUTS SEG # BLD: 6.6 K/UL (ref 1.8–8)
NEUTS SEG NFR BLD: 71 % (ref 32–75)
NRBC # BLD: 0 K/UL (ref 0–0.01)
NRBC BLD-RTO: 0 PER 100 WBC
PLATELET # BLD AUTO: 246 K/UL (ref 150–400)
PMV BLD AUTO: 10.6 FL (ref 8.9–12.9)
POTASSIUM SERPL-SCNC: 3.3 MMOL/L (ref 3.5–5.1)
RBC # BLD AUTO: 2.58 M/UL (ref 3.8–5.2)
SODIUM SERPL-SCNC: 132 MMOL/L (ref 136–145)
WBC # BLD AUTO: 9.3 K/UL (ref 3.6–11)

## 2023-07-15 PROCEDURE — 36415 COLL VENOUS BLD VENIPUNCTURE: CPT

## 2023-07-15 PROCEDURE — 6370000000 HC RX 637 (ALT 250 FOR IP): Performed by: NURSE PRACTITIONER

## 2023-07-15 PROCEDURE — 99233 SBSQ HOSP IP/OBS HIGH 50: CPT | Performed by: INTERNAL MEDICINE

## 2023-07-15 PROCEDURE — 85025 COMPLETE CBC W/AUTO DIFF WBC: CPT

## 2023-07-15 PROCEDURE — 80048 BASIC METABOLIC PNL TOTAL CA: CPT

## 2023-07-15 PROCEDURE — 2580000003 HC RX 258: Performed by: NURSE PRACTITIONER

## 2023-07-15 PROCEDURE — 6360000002 HC RX W HCPCS: Performed by: NURSE PRACTITIONER

## 2023-07-15 PROCEDURE — 2060000000 HC ICU INTERMEDIATE R&B

## 2023-07-15 PROCEDURE — 6370000000 HC RX 637 (ALT 250 FOR IP): Performed by: INTERNAL MEDICINE

## 2023-07-15 RX ORDER — POTASSIUM CHLORIDE 20 MEQ/1
20 TABLET, EXTENDED RELEASE ORAL 2 TIMES DAILY WITH MEALS
Status: DISCONTINUED | OUTPATIENT
Start: 2023-07-15 | End: 2023-07-19 | Stop reason: HOSPADM

## 2023-07-15 RX ADMIN — RIVAROXABAN 15 MG: 15 TABLET, FILM COATED ORAL at 17:59

## 2023-07-15 RX ADMIN — ACETAMINOPHEN 650 MG: 325 TABLET ORAL at 08:14

## 2023-07-15 RX ADMIN — PANTOPRAZOLE SODIUM 40 MG: 40 TABLET, DELAYED RELEASE ORAL at 08:14

## 2023-07-15 RX ADMIN — PAROXETINE HYDROCHLORIDE 10 MG: 20 TABLET, FILM COATED ORAL at 08:13

## 2023-07-15 RX ADMIN — PIPERACILLIN AND TAZOBACTAM 3375 MG: 3; .375 INJECTION, POWDER, LYOPHILIZED, FOR SOLUTION INTRAVENOUS at 23:20

## 2023-07-15 RX ADMIN — MIDODRINE HYDROCHLORIDE 5 MG: 5 TABLET ORAL at 17:59

## 2023-07-15 RX ADMIN — PIPERACILLIN AND TAZOBACTAM 3375 MG: 3; .375 INJECTION, POWDER, LYOPHILIZED, FOR SOLUTION INTRAVENOUS at 12:13

## 2023-07-15 RX ADMIN — ATORVASTATIN CALCIUM 40 MG: 40 TABLET, FILM COATED ORAL at 08:14

## 2023-07-15 RX ADMIN — MIDODRINE HYDROCHLORIDE 5 MG: 5 TABLET ORAL at 08:14

## 2023-07-15 RX ADMIN — SODIUM CHLORIDE: 9 INJECTION, SOLUTION INTRAVENOUS at 13:08

## 2023-07-15 RX ADMIN — POTASSIUM CHLORIDE 20 MEQ: 20 TABLET, EXTENDED RELEASE ORAL at 17:59

## 2023-07-15 RX ADMIN — AMIODARONE HYDROCHLORIDE 400 MG: 200 TABLET ORAL at 08:14

## 2023-07-15 RX ADMIN — Medication: at 15:44

## 2023-07-15 RX ADMIN — Medication: at 20:52

## 2023-07-15 RX ADMIN — ACETAMINOPHEN 650 MG: 325 TABLET ORAL at 20:57

## 2023-07-15 ASSESSMENT — PAIN DESCRIPTION - LOCATION
LOCATION: BACK;LEG
LOCATION: BACK

## 2023-07-15 ASSESSMENT — PAIN DESCRIPTION - ORIENTATION: ORIENTATION: LOWER

## 2023-07-15 ASSESSMENT — PAIN SCALES - GENERAL
PAINLEVEL_OUTOF10: 7
PAINLEVEL_OUTOF10: 4

## 2023-07-15 ASSESSMENT — PAIN DESCRIPTION - DESCRIPTORS
DESCRIPTORS: ACHING
DESCRIPTORS: ACHING

## 2023-07-16 LAB
ANION GAP SERPL CALC-SCNC: 8 MMOL/L (ref 5–15)
BACTERIA SPEC CULT: ABNORMAL
BUN SERPL-MCNC: 39 MG/DL (ref 6–20)
BUN/CREAT SERPL: 40 (ref 12–20)
CALCIUM SERPL-MCNC: 8.3 MG/DL (ref 8.5–10.1)
CHLORIDE SERPL-SCNC: 106 MMOL/L (ref 97–108)
CO2 SERPL-SCNC: 21 MMOL/L (ref 21–32)
CREAT SERPL-MCNC: 0.98 MG/DL (ref 0.55–1.02)
GLUCOSE SERPL-MCNC: 89 MG/DL (ref 65–100)
GRAM STN SPEC: ABNORMAL
GRAM STN SPEC: ABNORMAL
POTASSIUM SERPL-SCNC: 3.6 MMOL/L (ref 3.5–5.1)
SERVICE CMNT-IMP: ABNORMAL
SODIUM SERPL-SCNC: 135 MMOL/L (ref 136–145)

## 2023-07-16 PROCEDURE — 99233 SBSQ HOSP IP/OBS HIGH 50: CPT | Performed by: INTERNAL MEDICINE

## 2023-07-16 PROCEDURE — 6370000000 HC RX 637 (ALT 250 FOR IP): Performed by: INTERNAL MEDICINE

## 2023-07-16 PROCEDURE — 6370000000 HC RX 637 (ALT 250 FOR IP): Performed by: NURSE PRACTITIONER

## 2023-07-16 PROCEDURE — 6360000002 HC RX W HCPCS: Performed by: INTERNAL MEDICINE

## 2023-07-16 PROCEDURE — 2060000000 HC ICU INTERMEDIATE R&B

## 2023-07-16 PROCEDURE — 80048 BASIC METABOLIC PNL TOTAL CA: CPT

## 2023-07-16 PROCEDURE — 2580000003 HC RX 258: Performed by: INTERNAL MEDICINE

## 2023-07-16 PROCEDURE — 36415 COLL VENOUS BLD VENIPUNCTURE: CPT

## 2023-07-16 PROCEDURE — 6360000002 HC RX W HCPCS: Performed by: NURSE PRACTITIONER

## 2023-07-16 PROCEDURE — 2580000003 HC RX 258: Performed by: NURSE PRACTITIONER

## 2023-07-16 RX ORDER — POLYETHYLENE GLYCOL 3350 17 G/17G
17 POWDER, FOR SOLUTION ORAL DAILY
Status: DISCONTINUED | OUTPATIENT
Start: 2023-07-16 | End: 2023-07-19 | Stop reason: HOSPADM

## 2023-07-16 RX ORDER — CASTOR OIL AND BALSAM, PERU 788; 87 MG/G; MG/G
OINTMENT TOPICAL 2 TIMES DAILY
Status: DISCONTINUED | OUTPATIENT
Start: 2023-07-17 | End: 2023-07-17

## 2023-07-16 RX ORDER — ENEMA 19; 7 G/133ML; G/133ML
1 ENEMA RECTAL
Status: ACTIVE | OUTPATIENT
Start: 2023-07-16 | End: 2023-07-17

## 2023-07-16 RX ADMIN — POLYETHYLENE GLYCOL 3350 17 G: 17 POWDER, FOR SOLUTION ORAL at 11:41

## 2023-07-16 RX ADMIN — SODIUM CHLORIDE: 9 INJECTION, SOLUTION INTRAVENOUS at 03:37

## 2023-07-16 RX ADMIN — ACETAMINOPHEN 650 MG: 325 TABLET ORAL at 23:19

## 2023-07-16 RX ADMIN — MIDODRINE HYDROCHLORIDE 5 MG: 5 TABLET ORAL at 09:16

## 2023-07-16 RX ADMIN — MIDODRINE HYDROCHLORIDE 5 MG: 5 TABLET ORAL at 17:09

## 2023-07-16 RX ADMIN — AMIODARONE HYDROCHLORIDE 400 MG: 200 TABLET ORAL at 09:16

## 2023-07-16 RX ADMIN — POTASSIUM CHLORIDE 20 MEQ: 20 TABLET, EXTENDED RELEASE ORAL at 09:16

## 2023-07-16 RX ADMIN — ACETAMINOPHEN 650 MG: 325 TABLET ORAL at 14:29

## 2023-07-16 RX ADMIN — SODIUM CHLORIDE: 9 INJECTION, SOLUTION INTRAVENOUS at 17:25

## 2023-07-16 RX ADMIN — POTASSIUM CHLORIDE 20 MEQ: 20 TABLET, EXTENDED RELEASE ORAL at 17:09

## 2023-07-16 RX ADMIN — Medication: at 23:23

## 2023-07-16 RX ADMIN — PAROXETINE HYDROCHLORIDE 10 MG: 20 TABLET, FILM COATED ORAL at 09:16

## 2023-07-16 RX ADMIN — PIPERACILLIN AND TAZOBACTAM 3375 MG: 3; .375 INJECTION, POWDER, LYOPHILIZED, FOR SOLUTION INTRAVENOUS at 11:41

## 2023-07-16 RX ADMIN — RIVAROXABAN 15 MG: 15 TABLET, FILM COATED ORAL at 17:09

## 2023-07-16 RX ADMIN — PANTOPRAZOLE SODIUM 40 MG: 40 TABLET, DELAYED RELEASE ORAL at 06:56

## 2023-07-16 RX ADMIN — ATORVASTATIN CALCIUM 40 MG: 40 TABLET, FILM COATED ORAL at 09:16

## 2023-07-16 RX ADMIN — PIPERACILLIN AND TAZOBACTAM 3375 MG: 3; .375 INJECTION, POWDER, LYOPHILIZED, FOR SOLUTION INTRAVENOUS at 20:15

## 2023-07-16 ASSESSMENT — PAIN DESCRIPTION - LOCATION
LOCATION: BACK
LOCATION: BACK

## 2023-07-16 ASSESSMENT — PAIN SCALES - GENERAL
PAINLEVEL_OUTOF10: 6
PAINLEVEL_OUTOF10: 8

## 2023-07-16 ASSESSMENT — PAIN DESCRIPTION - ORIENTATION: ORIENTATION: LOWER

## 2023-07-16 ASSESSMENT — PAIN DESCRIPTION - DESCRIPTORS: DESCRIPTORS: ACHING

## 2023-07-17 PROCEDURE — 97530 THERAPEUTIC ACTIVITIES: CPT

## 2023-07-17 PROCEDURE — 99233 SBSQ HOSP IP/OBS HIGH 50: CPT | Performed by: INTERNAL MEDICINE

## 2023-07-17 PROCEDURE — 6370000000 HC RX 637 (ALT 250 FOR IP): Performed by: INTERNAL MEDICINE

## 2023-07-17 PROCEDURE — 2060000000 HC ICU INTERMEDIATE R&B

## 2023-07-17 PROCEDURE — 2580000003 HC RX 258: Performed by: INTERNAL MEDICINE

## 2023-07-17 PROCEDURE — 6370000000 HC RX 637 (ALT 250 FOR IP): Performed by: NURSE PRACTITIONER

## 2023-07-17 PROCEDURE — 2580000003 HC RX 258: Performed by: NURSE PRACTITIONER

## 2023-07-17 PROCEDURE — 6360000002 HC RX W HCPCS: Performed by: INTERNAL MEDICINE

## 2023-07-17 RX ORDER — PAROXETINE 10 MG/1
10 TABLET, FILM COATED ORAL DAILY
Qty: 30 TABLET | Refills: 3 | Status: SHIPPED
Start: 2023-07-17

## 2023-07-17 RX ORDER — CASTOR OIL AND BALSAM, PERU 788; 87 MG/G; MG/G
OINTMENT TOPICAL 2 TIMES DAILY
Status: DISCONTINUED | OUTPATIENT
Start: 2023-07-17 | End: 2023-07-19 | Stop reason: HOSPADM

## 2023-07-17 RX ORDER — POLYETHYLENE GLYCOL 3350 17 G/17G
17 POWDER, FOR SOLUTION ORAL DAILY
Qty: 17 G | Refills: 0 | Status: SHIPPED
Start: 2023-07-17 | End: 2023-08-16

## 2023-07-17 RX ORDER — AMIODARONE HYDROCHLORIDE 400 MG/1
400 TABLET ORAL DAILY
Qty: 1 TABLET | Refills: 0 | Status: SHIPPED
Start: 2023-07-17

## 2023-07-17 RX ORDER — PANTOPRAZOLE SODIUM 40 MG/1
40 TABLET, DELAYED RELEASE ORAL
Qty: 30 TABLET | Refills: 3 | Status: SHIPPED
Start: 2023-07-18

## 2023-07-17 RX ORDER — POTASSIUM CHLORIDE 20 MEQ/1
20 TABLET, EXTENDED RELEASE ORAL DAILY
Qty: 30 TABLET | Refills: 0 | Status: SHIPPED
Start: 2023-07-17

## 2023-07-17 RX ADMIN — MIDODRINE HYDROCHLORIDE 5 MG: 5 TABLET ORAL at 17:11

## 2023-07-17 RX ADMIN — Medication: at 21:07

## 2023-07-17 RX ADMIN — Medication: at 09:07

## 2023-07-17 RX ADMIN — PIPERACILLIN AND TAZOBACTAM 3375 MG: 3; .375 INJECTION, POWDER, LYOPHILIZED, FOR SOLUTION INTRAVENOUS at 11:39

## 2023-07-17 RX ADMIN — MIDODRINE HYDROCHLORIDE 5 MG: 5 TABLET ORAL at 09:06

## 2023-07-17 RX ADMIN — ACETAMINOPHEN 650 MG: 325 TABLET ORAL at 21:19

## 2023-07-17 RX ADMIN — SODIUM CHLORIDE: 9 INJECTION, SOLUTION INTRAVENOUS at 06:42

## 2023-07-17 RX ADMIN — SODIUM CHLORIDE: 9 INJECTION, SOLUTION INTRAVENOUS at 21:12

## 2023-07-17 RX ADMIN — ATORVASTATIN CALCIUM 40 MG: 40 TABLET, FILM COATED ORAL at 09:06

## 2023-07-17 RX ADMIN — POTASSIUM CHLORIDE 20 MEQ: 20 TABLET, EXTENDED RELEASE ORAL at 09:06

## 2023-07-17 RX ADMIN — Medication: at 03:20

## 2023-07-17 RX ADMIN — AMIODARONE HYDROCHLORIDE 400 MG: 200 TABLET ORAL at 09:06

## 2023-07-17 RX ADMIN — PIPERACILLIN AND TAZOBACTAM 3375 MG: 3; .375 INJECTION, POWDER, LYOPHILIZED, FOR SOLUTION INTRAVENOUS at 03:24

## 2023-07-17 RX ADMIN — ACETAMINOPHEN 650 MG: 325 TABLET ORAL at 12:02

## 2023-07-17 RX ADMIN — POTASSIUM CHLORIDE 20 MEQ: 20 TABLET, EXTENDED RELEASE ORAL at 17:11

## 2023-07-17 RX ADMIN — PANTOPRAZOLE SODIUM 40 MG: 40 TABLET, DELAYED RELEASE ORAL at 06:43

## 2023-07-17 RX ADMIN — PAROXETINE HYDROCHLORIDE 10 MG: 20 TABLET, FILM COATED ORAL at 09:06

## 2023-07-17 RX ADMIN — RIVAROXABAN 20 MG: 20 TABLET, FILM COATED ORAL at 17:12

## 2023-07-17 ASSESSMENT — PAIN SCALES - GENERAL: PAINLEVEL_OUTOF10: 6

## 2023-07-17 ASSESSMENT — PAIN DESCRIPTION - LOCATION: LOCATION: HEAD

## 2023-07-17 ASSESSMENT — PAIN DESCRIPTION - DESCRIPTORS: DESCRIPTORS: ACHING

## 2023-07-17 NOTE — PROGRESS NOTES
Pharmacist Discharge Medication Reconciliation    Significant PMH:   Past Medical History:   Diagnosis Date    Arrhythmia     ATRIAL FIB    Hypertension        Admitting Diagnoses: Dehydration [E86.0]  Hyponatremia [E87.1]  Edema of both legs [R60.0]  SAMUEL (acute kidney injury) ([address]) [N17.9]  Bilateral lower extremity edema [R60.0]  Unable to care for self [Z78.9]    Allergies: Patient has no known allergies. Admission date: 7/12/2023 10:57 AM    Current Discharge Medication List        START taking these medications    Details   PARoxetine (PAXIL) 10 MG tablet Take 1 tablet by mouth daily  Qty: 30 tablet, Refills: 3  Start date: 7/17/2023      polyethylene glycol (GLYCOLAX) 17 g packet Take 17 g by mouth daily  Qty: 17 g, Refills: 0  Start date: 7/17/2023, End date: 8/16/2023      potassium chloride (KLOR-CON M) 20 MEQ extended release tablet Take 1 tablet by mouth daily  Qty: 30 tablet, Refills: 0  Start date: 7/17/2023      pantoprazole (PROTONIX) 40 MG tablet Take 1 tablet by mouth every morning (before breakfast)  Qty: 30 tablet, Refills: 3  Start date: 7/18/2023           CONTINUE these medications which have CHANGED    Details   amiodarone (PACERONE) 400 MG tablet Take 1 tablet by mouth daily  Qty: 1 tablet, Refills: 0  Start date: 7/17/2023           CONTINUE these medications which have NOT CHANGED    Details   midodrine (PROAMATINE) 5 MG tablet Take 1 tablet by mouth 2 times daily (with meals)  Qty: 90 tablet, Refills: 3      atorvastatin (LIPITOR) 40 MG tablet Take 1 tablet by mouth daily      rivaroxaban (XARELTO) 20 MG TABS tablet TAKE 1 TABLET BY MOUTH ONCE DAILY WITH EVENING MEAL. The patient's chart, MAR and AVS were reviewed by Kate Romero RPH.     Discharging Provider: Dinora Villatoro MD     Thank you,     Paulina Adams [facility], [facility]

## 2023-07-17 NOTE — PLAN OF CARE
Problem: Physical Therapy - Adult  Goal: By Discharge: Performs mobility at highest level of function for planned discharge setting. See evaluation for individualized goals. Description: FUNCTIONAL STATUS PRIOR TO ADMISSION: Recently discharged home from SNF following hospitalization at [facility] in June 2023. Currently ambulating with RW, previously using SPC. History of multiple falls. HOME SUPPORT PRIOR TO ADMISSION: The patient lived alone with limited caregiver assist.    Physical Therapy Goals  Initiated 7/13/2023  1. Patient will move from supine to sit and sit to supine, scoot up and down, and roll side to side in bed with supervision/set-up within 7 day(s). 2.  Patient will perform sit to stand with supervision/set-up within 7 day(s). 3.  Patient will transfer from bed to chair and chair to bed with supervision/set-up using the least restrictive device within 7 day(s). 4.  Patient will ambulate with supervision/set-up for 50 feet with the least restrictive device within 7 day(s). Outcome: Progressing     PHYSICAL THERAPY TREATMENT    Patient: Gia Dalton (15 y.o. female)  Date: 7/17/2023  Diagnosis: Dehydration [E86.0]  Hyponatremia [E87.1]  Edema of both legs [R60.0]  SAMUEL (acute kidney injury) ([address]) [N17.9]  Bilateral lower extremity edema [R60.0]  Unable to care for self [Z78.9] Hyponatremia      Precautions: Fall Risk                    ASSESSMENT:  Patient continues to benefit from skilled PT services and is slowly progressing towards goals. Patient was asleep in bed, but woke up to therapist's voice and was agreeable to mobilize. She required minimum assist for supine-sit transfer for LE management, HOB slightly elevated. No dizziness reported with any changes in position. BP slightly increased with all changes in position. Able to perform sit<>stand transfer with SBA.  Performed bed-chair transfer via RW and stand<>pivot with contact guard assist. At end of session patient was left

## 2023-07-17 NOTE — PROGRESS NOTES
Bedside and Verbal shift change report given to [address] (oncoming nurse) by Sav Sinha RN (offgoing nurse). Report included the following information Nurse Handoff Report, Intake/Output, MAR, and Cardiac Rhythm A-Fib/NSR .

## 2023-07-17 NOTE — PROGRESS NOTES
Rivaroxaban dosing:  Afib  SAMUEL, resolved, Scr 3.10 to 0.98. Change dose from 15 mg to 20 mg daily with dinner. 20 mg taken prior to admission. Thank you,    Andreas Ward.  Mari Hawkins, BCPS  Transitions of [address]   [address]  [phone] (zone phone)

## 2023-07-17 NOTE — PROGRESS NOTES
End of Shift Note    Bedside shift change report given to Joe Loja (oncoming nurse) by Geri Justin RN (offgoing nurse). Report included the following information SBAR, MAR, and Cardiac Rhythm Afib    Shift worked:  7p-7a     Shift summary and any significant changes:     Noted Left ?pressure injury not seen by Wound care, wound consult place. Bilateral heels red, blanchable. Venelex ordered. New wound on anterior right foot noted. Requested wound care to review. Concerns for physician to address:  No labs ordered     Zone phone for oncoming shift:          Activity:     Number times ambulated in hallways past shift: 0  Number of times OOB to chair past shift: 0    Cardiac:   Cardiac Monitoring: Yes           Access:  Current line(s): PIV     Genitourinary:   Urinary status: voiding    Respiratory:      Chronic home O2 use?: NO  Incentive spirometer at bedside: NO       GI:     Current diet:  ADULT DIET; Regular  Passing flatus: YES  Tolerating current diet: YES       Pain Management:   Patient states pain is manageable on current regimen: YES    Skin:     Interventions: specialty bed, float heels, increase time out of bed, foam dressing, PT/OT consult, limit briefs, internal/external urinary devices, and nutritional support    Patient Safety:  Fall Score:    Interventions: bed/chair alarm, assistive device (walker, cane.  etc), gripper socks, and pt to call before getting OOB       Length of Stay:  Expected LOS: 3  Actual LOS: 255 Alfredo Gale RN

## 2023-07-17 NOTE — PLAN OF CARE
Problem: Discharge Planning  Goal: Discharge to home or other facility with appropriate resources  7/17/2023 1053 by Ross Bhatia RN  Outcome: Progressing  7/17/2023 0350 by Jordon Grey RN  Outcome: Progressing     Problem: Pain  Goal: Verbalizes/displays adequate comfort level or baseline comfort level  7/17/2023 1053 by Ross Bhatia RN  Outcome: Progressing  7/17/2023 0350 by Jordon Grey RN  Outcome: Progressing     Problem: Skin/Tissue Integrity  Goal: Absence of new skin breakdown  Description: 1. Monitor for areas of redness and/or skin breakdown  2. Assess vascular access sites hourly  3. Every 4-6 hours minimum:  Change oxygen saturation probe site  4. Every 4-6 hours:  If on nasal continuous positive airway pressure, respiratory therapy assess nares and determine need for appliance change or resting period.   7/17/2023 1053 by Ross Bhatia RN  Outcome: Progressing  7/17/2023 0350 by Jordon Grey RN  Outcome: Progressing     Problem: Safety - Adult  Goal: Free from fall injury  7/17/2023 1053 by Ross Bhatia RN  Outcome: Progressing  7/17/2023 0350 by Jordon Grye RN  Outcome: Progressing     Problem: ABCDS Injury Assessment  Goal: Absence of physical injury  7/17/2023 1053 by Ross Bhatia RN  Outcome: Progressing  7/17/2023 0350 by Jordon Grey RN  Outcome: Progressing

## 2023-07-18 LAB
BACTERIA SPEC CULT: ABNORMAL
BACTERIA SPEC CULT: NORMAL
BACTERIA SPEC CULT: NORMAL
SERVICE CMNT-IMP: ABNORMAL
SERVICE CMNT-IMP: NORMAL
SERVICE CMNT-IMP: NORMAL

## 2023-07-18 PROCEDURE — 6370000000 HC RX 637 (ALT 250 FOR IP): Performed by: NURSE PRACTITIONER

## 2023-07-18 PROCEDURE — 97110 THERAPEUTIC EXERCISES: CPT

## 2023-07-18 PROCEDURE — 97116 GAIT TRAINING THERAPY: CPT

## 2023-07-18 PROCEDURE — 99233 SBSQ HOSP IP/OBS HIGH 50: CPT | Performed by: INTERNAL MEDICINE

## 2023-07-18 PROCEDURE — 2060000000 HC ICU INTERMEDIATE R&B

## 2023-07-18 PROCEDURE — 6370000000 HC RX 637 (ALT 250 FOR IP): Performed by: INTERNAL MEDICINE

## 2023-07-18 PROCEDURE — 6360000002 HC RX W HCPCS: Performed by: INTERNAL MEDICINE

## 2023-07-18 RX ORDER — LEVOFLOXACIN 5 MG/ML
750 INJECTION, SOLUTION INTRAVENOUS EVERY 24 HOURS
Status: DISCONTINUED | OUTPATIENT
Start: 2023-07-18 | End: 2023-07-19 | Stop reason: HOSPADM

## 2023-07-18 RX ORDER — LEVOFLOXACIN 750 MG/1
750 TABLET ORAL DAILY
Qty: 1 TABLET | Refills: 0 | Status: SHIPPED
Start: 2023-07-18 | End: 2023-07-18 | Stop reason: HOSPADM

## 2023-07-18 RX ORDER — CIPROFLOXACIN 500 MG/1
500 TABLET, FILM COATED ORAL 2 TIMES DAILY
Qty: 14 TABLET | Refills: 0 | Status: SHIPPED
Start: 2023-07-18 | End: 2023-07-25

## 2023-07-18 RX ORDER — DOXYCYCLINE HYCLATE 100 MG
100 TABLET ORAL 2 TIMES DAILY
Qty: 14 TABLET | Refills: 0 | Status: SHIPPED
Start: 2023-07-18 | End: 2023-07-25

## 2023-07-18 RX ADMIN — POTASSIUM CHLORIDE 20 MEQ: 20 TABLET, EXTENDED RELEASE ORAL at 17:28

## 2023-07-18 RX ADMIN — LEVOFLOXACIN 750 MG: 5 INJECTION, SOLUTION INTRAVENOUS at 17:26

## 2023-07-18 RX ADMIN — PANTOPRAZOLE SODIUM 40 MG: 40 TABLET, DELAYED RELEASE ORAL at 07:18

## 2023-07-18 RX ADMIN — Medication: at 21:57

## 2023-07-18 RX ADMIN — ATORVASTATIN CALCIUM 40 MG: 40 TABLET, FILM COATED ORAL at 09:32

## 2023-07-18 RX ADMIN — Medication: at 09:32

## 2023-07-18 RX ADMIN — RIVAROXABAN 20 MG: 20 TABLET, FILM COATED ORAL at 17:28

## 2023-07-18 RX ADMIN — MIDODRINE HYDROCHLORIDE 5 MG: 5 TABLET ORAL at 17:28

## 2023-07-18 RX ADMIN — PAROXETINE HYDROCHLORIDE 10 MG: 20 TABLET, FILM COATED ORAL at 09:31

## 2023-07-18 RX ADMIN — AMIODARONE HYDROCHLORIDE 400 MG: 200 TABLET ORAL at 09:32

## 2023-07-18 RX ADMIN — MIDODRINE HYDROCHLORIDE 5 MG: 5 TABLET ORAL at 09:31

## 2023-07-18 RX ADMIN — POTASSIUM CHLORIDE 20 MEQ: 20 TABLET, EXTENDED RELEASE ORAL at 09:31

## 2023-07-18 ASSESSMENT — PAIN SCALES - GENERAL
PAINLEVEL_OUTOF10: 0
PAINLEVEL_OUTOF10: 0

## 2023-07-18 NOTE — PLAN OF CARE
Problem: Discharge Planning  Goal: Discharge to home or other facility with appropriate resources  Outcome: Progressing     Problem: Pain  Goal: Verbalizes/displays adequate comfort level or baseline comfort level  Outcome: Progressing     Problem: Skin/Tissue Integrity  Goal: Absence of new skin breakdown  Description: 1. Monitor for areas of redness and/or skin breakdown  2. Assess vascular access sites hourly  3. Every 4-6 hours minimum:  Change oxygen saturation probe site  4. Every 4-6 hours:  If on nasal continuous positive airway pressure, respiratory therapy assess nares and determine need for appliance change or resting period. Outcome: Progressing     Problem: Safety - Adult  Goal: Free from fall injury  Outcome: Progressing     Problem: Physical Therapy - Adult  Goal: By Discharge: Performs mobility at highest level of function for planned discharge setting. See evaluation for individualized goals. Description: FUNCTIONAL STATUS PRIOR TO ADMISSION: Recently discharged home from SNF following hospitalization at [facility] in June 2023. Currently ambulating with RW, previously using SPC. History of multiple falls. HOME SUPPORT PRIOR TO ADMISSION: The patient lived alone with limited caregiver assist.    Physical Therapy Goals  Initiated 7/13/2023  1. Patient will move from supine to sit and sit to supine, scoot up and down, and roll side to side in bed with supervision/set-up within 7 day(s). 2.  Patient will perform sit to stand with supervision/set-up within 7 day(s). 3.  Patient will transfer from bed to chair and chair to bed with supervision/set-up using the least restrictive device within 7 day(s). 4.  Patient will ambulate with supervision/set-up for 50 feet with the least restrictive device within 7 day(s).    9/38/2659 0160 by Marizol Hernandez PT  Outcome: Progressing     Problem: ABCDS Injury Assessment  Goal: Absence of physical injury  Outcome: Progressing

## 2023-07-18 NOTE — PROGRESS NOTES
End of Shift Note    Bedside shift change report given to RN (oncoming nurse) by Hawa Castillo RN (offgoing nurse). Report included the following information SBAR, Kardex, Intake/Output, MAR, and Recent Results    Shift worked:  2792-9305     Shift summary and any significant changes:     No significant changes. Concerns for physician to address:  None at this time. Zone phone for oncoming shift:  0000       Activity:     Number times ambulated in hallways past shift: 0  Number of times OOB to chair past shift: 0    Cardiac:   Cardiac Monitoring: Yes           Access:  Current line(s): PIV     Genitourinary:   Urinary status: voiding    Respiratory:      Chronic home O2 use?: NO  Incentive spirometer at bedside: YES       GI:     Current diet:  ADULT DIET; Regular  Passing flatus: YES  Tolerating current diet: YES       Pain Management:   Patient states pain is manageable on current regimen: YES    Skin:     Interventions: turn team, specialty bed, float heels, increase time out of bed, PT/OT consult, and internal/external urinary devices    Patient Safety:  Fall Score:    Interventions: bed/chair alarm, assistive device (walker, cane.  etc), gripper socks, and pt to call before getting OOB       Length of Stay:  Expected LOS: 5  Actual LOS: 1102 [facility], RN

## 2023-07-18 NOTE — PLAN OF CARE
Problem: Discharge Planning  Goal: Discharge to home or other facility with appropriate resources  7/18/2023 0834 by Spike Damon RN  Outcome: Progressing  Flowsheets (Taken 7/18/2023 0307)  Discharge to home or other facility with appropriate resources:   Identify barriers to discharge with patient and caregiver   Arrange for needed discharge resources and transportation as appropriate   Identify discharge learning needs (meds, wound care, etc)  7/18/2023 0226 by Jayden Corley RN  Outcome: Progressing     Problem: Pain  Goal: Verbalizes/displays adequate comfort level or baseline comfort level  7/18/2023 0834 by Spike Damon RN  Outcome: Progressing  Flowsheets (Taken 7/18/2023 0834)  Verbalizes/displays adequate comfort level or baseline comfort level:   Encourage patient to monitor pain and request assistance   Assess pain using appropriate pain scale   Administer analgesics based on type and severity of pain and evaluate response   Implement non-pharmacological measures as appropriate and evaluate response  7/18/2023 0226 by Jayden Corley RN  Outcome: Progressing     Problem: Skin/Tissue Integrity  Goal: Absence of new skin breakdown  Description: 1. Monitor for areas of redness and/or skin breakdown  2. Assess vascular access sites hourly  3. Every 4-6 hours minimum:  Change oxygen saturation probe site  4. Every 4-6 hours:  If on nasal continuous positive airway pressure, respiratory therapy assess nares and determine need for appliance change or resting period.   7/18/2023 0834 by Spike Damon RN  Outcome: Progressing  7/18/2023 0226 by Jayden Corley RN  Outcome: Progressing     Problem: Safety - Adult  Goal: Free from fall injury  7/18/2023 0834 by Spike Damon RN  Outcome: Progressing  Flowsheets (Taken 7/18/2023 0834)  Free From Fall Injury:   Instruct family/caregiver on patient safety   Based on caregiver fall risk screen, instruct family/caregiver to ask for assistance

## 2023-07-18 NOTE — PLAN OF CARE
Problem: Physical Therapy - Adult  Goal: By Discharge: Performs mobility at highest level of function for planned discharge setting. See evaluation for individualized goals. Description: FUNCTIONAL STATUS PRIOR TO ADMISSION: Recently discharged home from SNF following hospitalization at [facility] in June 2023. Currently ambulating with RW, previously using SPC. History of multiple falls. HOME SUPPORT PRIOR TO ADMISSION: The patient lived alone with limited caregiver assist.    Physical Therapy Goals  Initiated 7/13/2023  1. Patient will move from supine to sit and sit to supine, scoot up and down, and roll side to side in bed with supervision/set-up within 7 day(s). 2.  Patient will perform sit to stand with supervision/set-up within 7 day(s). 3.  Patient will transfer from bed to chair and chair to bed with supervision/set-up using the least restrictive device within 7 day(s). 4.  Patient will ambulate with supervision/set-up for 50 feet with the least restrictive device within 7 day(s). Outcome: Progressing   PHYSICAL THERAPY TREATMENT    Patient: Romy Nino (73 y.o. female)  Date: 7/18/2023  Diagnosis: Dehydration [E86.0]  Hyponatremia [E87.1]  Edema of both legs [R60.0]  SAMUEL (acute kidney injury) ([address]) [N17.9]  Bilateral lower extremity edema [R60.0]  Unable to care for self [Z78.9] Hyponatremia      Precautions: Fall Risk                    ASSESSMENT:  Patient continues to benefit from skilled PT services and is progressing towards goals. Pt received seated in recliner, agreeable to PT session. Pt with increased ambulation distance this session and improved ability to clear her right foot. No physical assistance required for mobility this session, however verbal safety cues for safe position in RW and RW management. Pt participated in seated BUE/LE exercises with good performance. Instructed to perform throughout the day. Pt left sitting in recliner.   SNF rehab remains appropriate as pt

## 2023-07-18 NOTE — PROGRESS NOTES
0730: Bedside shift report received from [place], [facility]: Pt assisted to EOB and wipes down with CHG wipes and transferred x 1 assist and walker to recliner. 1030: Physical therapy in to work with patient. 1040: Pts IV inflitrated, messaged MD to ask if pt needs another IV placed since she is ready for discharge today. Waiting for a response.    1300: Lab called, would culture collected on 7/12 from left leg wound now + for MRSA. Notified MD and stated to order Vanc 1g and have pharmacy to dose. 1530: ED tech in room in order to obtain IV access.

## 2023-07-18 NOTE — PROGRESS NOTES
Comprehensive Nutrition Assessment    Type and Reason for Visit:  Reassess    Nutrition Recommendations/Plan:   Continue current diet      Malnutrition Assessment:  Malnutrition Status:  No malnutrition (07/18/23 1155)      Nutrition Assessment:    Chart reviewed; patient continues on a regular diet. Sitting up in the chair at time of visit. She reports a good appetite and eating well. No significant weight changes recently. Stable for discharge pending insurance authorization. No nutrition questions or concerns reported. Nutrition Related Findings:    Na 135, BG 89   2+ edema BLE   BM 7/17   Atorvastatin, Midodrine, protonix, paxil, Miralax, KCl   Wound Type: Moisture Associate Skin Damage, partial thickness left leg       Current Nutrition Intake & Therapies:          ADULT DIET; Regular    Anthropometric Measures:  Height: 158 cm (5' 2.21\")  Ideal Body Weight (IBW): 111 lbs (50 kg)       Current Body Weight: 199 lb 1.2 oz (90.3 kg),   IBW. Current BMI (kg/m2): 36.2                          BMI Categories: Obese Class 2 (BMI 35.0 -39.9)    Estimated Daily Nutrient Needs:  Energy Requirements Based On: Formula  Weight Used for Energy Requirements: Current  Energy (kcal/day): 1788 kcals (BMR x 1. 3AF)  Weight Used for Protein Requirements: Current  Protein (g/day): 72-90g (0.8-1.0 g/kg bw)  Method Used for Fluid Requirements: 1 ml/kcal  Fluid (ml/day): 1800 mL or per MD    Nutrition Diagnosis:   No nutrition diagnosis at this time     Nutrition Interventions:   Food and/or Nutrient Delivery: Continue Current Diet  Nutrition Education/Counseling: No recommendation at this time  Coordination of Nutrition Care: Continue to monitor while inpatient       Goals:  Previous Goal Met: Goal(s) Achieved  Goals: PO intake 75% or greater, by next RD assessment       Nutrition Monitoring and Evaluation:   Behavioral-Environmental Outcomes: None Identified  Food/Nutrient Intake Outcomes: Food and Nutrient Intake  Physical

## 2023-07-19 VITALS
RESPIRATION RATE: 18 BRPM | HEART RATE: 81 BPM | WEIGHT: 199 LBS | SYSTOLIC BLOOD PRESSURE: 134 MMHG | DIASTOLIC BLOOD PRESSURE: 48 MMHG | HEIGHT: 62 IN | TEMPERATURE: 97.8 F | OXYGEN SATURATION: 98 % | BODY MASS INDEX: 36.62 KG/M2

## 2023-07-19 LAB
ANION GAP SERPL CALC-SCNC: 7 MMOL/L (ref 5–15)
BASOPHILS # BLD: 0 K/UL (ref 0–0.1)
BASOPHILS NFR BLD: 0 % (ref 0–1)
BUN SERPL-MCNC: 28 MG/DL (ref 6–20)
BUN/CREAT SERPL: 31 (ref 12–20)
CALCIUM SERPL-MCNC: 8.4 MG/DL (ref 8.5–10.1)
CHLORIDE SERPL-SCNC: 111 MMOL/L (ref 97–108)
CO2 SERPL-SCNC: 19 MMOL/L (ref 21–32)
CREAT SERPL-MCNC: 0.9 MG/DL (ref 0.55–1.02)
DIFFERENTIAL METHOD BLD: ABNORMAL
EOSINOPHIL # BLD: 0.6 K/UL (ref 0–0.4)
EOSINOPHIL NFR BLD: 5 % (ref 0–7)
ERYTHROCYTE [DISTWIDTH] IN BLOOD BY AUTOMATED COUNT: 16.1 % (ref 11.5–14.5)
GLUCOSE SERPL-MCNC: 93 MG/DL (ref 65–100)
HCT VFR BLD AUTO: 27.4 % (ref 35–47)
HGB BLD-MCNC: 8.4 G/DL (ref 11.5–16)
IMM GRANULOCYTES # BLD AUTO: 0 K/UL (ref 0–0.04)
IMM GRANULOCYTES NFR BLD AUTO: 0 % (ref 0–0.5)
LYMPHOCYTES # BLD: 2.4 K/UL (ref 0.8–3.5)
LYMPHOCYTES NFR BLD: 21 % (ref 12–49)
MCH RBC QN AUTO: 31.7 PG (ref 26–34)
MCHC RBC AUTO-ENTMCNC: 30.7 G/DL (ref 30–36.5)
MCV RBC AUTO: 103.4 FL (ref 80–99)
METAMYELOCYTES NFR BLD MANUAL: 1 %
MONOCYTES # BLD: 0.2 K/UL (ref 0–1)
MONOCYTES NFR BLD: 2 % (ref 5–13)
NEUTS SEG # BLD: 8 K/UL (ref 1.8–8)
NEUTS SEG NFR BLD: 71 % (ref 32–75)
NRBC # BLD: 0 K/UL (ref 0–0.01)
NRBC BLD-RTO: 0 PER 100 WBC
PLATELET # BLD AUTO: 228 K/UL (ref 150–400)
PMV BLD AUTO: 10.1 FL (ref 8.9–12.9)
POTASSIUM SERPL-SCNC: 3.7 MMOL/L (ref 3.5–5.1)
RBC # BLD AUTO: 2.65 M/UL (ref 3.8–5.2)
RBC MORPH BLD: ABNORMAL
RBC MORPH BLD: ABNORMAL
SODIUM SERPL-SCNC: 137 MMOL/L (ref 136–145)
WBC # BLD AUTO: 11.2 K/UL (ref 3.6–11)

## 2023-07-19 PROCEDURE — 85025 COMPLETE CBC W/AUTO DIFF WBC: CPT

## 2023-07-19 PROCEDURE — 2580000003 HC RX 258: Performed by: INTERNAL MEDICINE

## 2023-07-19 PROCEDURE — 99239 HOSP IP/OBS DSCHRG MGMT >30: CPT | Performed by: INTERNAL MEDICINE

## 2023-07-19 PROCEDURE — 6360000002 HC RX W HCPCS: Performed by: INTERNAL MEDICINE

## 2023-07-19 PROCEDURE — 80048 BASIC METABOLIC PNL TOTAL CA: CPT

## 2023-07-19 PROCEDURE — 97535 SELF CARE MNGMENT TRAINING: CPT

## 2023-07-19 PROCEDURE — 36415 COLL VENOUS BLD VENIPUNCTURE: CPT

## 2023-07-19 PROCEDURE — 6370000000 HC RX 637 (ALT 250 FOR IP): Performed by: INTERNAL MEDICINE

## 2023-07-19 PROCEDURE — 6370000000 HC RX 637 (ALT 250 FOR IP): Performed by: NURSE PRACTITIONER

## 2023-07-19 RX ADMIN — VANCOMYCIN HYDROCHLORIDE 750 MG: 750 INJECTION, POWDER, LYOPHILIZED, FOR SOLUTION INTRAVENOUS at 12:56

## 2023-07-19 RX ADMIN — ATORVASTATIN CALCIUM 40 MG: 40 TABLET, FILM COATED ORAL at 09:08

## 2023-07-19 RX ADMIN — PANTOPRAZOLE SODIUM 40 MG: 40 TABLET, DELAYED RELEASE ORAL at 06:38

## 2023-07-19 RX ADMIN — MIDODRINE HYDROCHLORIDE 5 MG: 5 TABLET ORAL at 16:40

## 2023-07-19 RX ADMIN — POTASSIUM CHLORIDE 20 MEQ: 20 TABLET, EXTENDED RELEASE ORAL at 16:40

## 2023-07-19 RX ADMIN — ACETAMINOPHEN 650 MG: 325 TABLET ORAL at 03:04

## 2023-07-19 RX ADMIN — RIVAROXABAN 20 MG: 20 TABLET, FILM COATED ORAL at 16:41

## 2023-07-19 RX ADMIN — POTASSIUM CHLORIDE 20 MEQ: 20 TABLET, EXTENDED RELEASE ORAL at 09:08

## 2023-07-19 RX ADMIN — PAROXETINE HYDROCHLORIDE 10 MG: 20 TABLET, FILM COATED ORAL at 09:08

## 2023-07-19 RX ADMIN — Medication: at 13:07

## 2023-07-19 RX ADMIN — MIDODRINE HYDROCHLORIDE 5 MG: 5 TABLET ORAL at 09:09

## 2023-07-19 RX ADMIN — Medication: at 09:09

## 2023-07-19 RX ADMIN — VANCOMYCIN HYDROCHLORIDE 2000 MG: 10 INJECTION, POWDER, LYOPHILIZED, FOR SOLUTION INTRAVENOUS at 01:02

## 2023-07-19 RX ADMIN — AMIODARONE HYDROCHLORIDE 400 MG: 200 TABLET ORAL at 09:08

## 2023-07-19 ASSESSMENT — PAIN DESCRIPTION - ORIENTATION: ORIENTATION: OTHER (COMMENT)

## 2023-07-19 ASSESSMENT — PAIN SCALES - GENERAL: PAINLEVEL_OUTOF10: 6

## 2023-07-19 ASSESSMENT — PAIN DESCRIPTION - DESCRIPTORS: DESCRIPTORS: ACHING

## 2023-07-19 ASSESSMENT — PAIN DESCRIPTION - LOCATION: LOCATION: KNEE;LEG

## 2023-07-19 NOTE — CARE COORDINATION
Transition of Care Plan:     RUR:18%  Disposition:SNF: [address] [place] [address]   If SNF or IPR: Date FOC offered:7/14  Date FOC received:7/14  Date authorization started with reference number:7/17  Date authorization received and expires:  Accepting facility: [facility]   Follow up appointments:  DME needed:  Transportation at Discharge: family vs BLS   [place] or means to access home:        IM Medicare Letter: to be provided   Is patient a  and connected with the [place]? If yes, was [employer] transfer form completed and VA notified? Caregiver Contact:Jenifer Fields (Child)   [phone]  Discharge Caregiver contacted prior to discharge? If Pt desires   Care Conference needed?:   no         Update: [facility] has accepted. CM notified admission to initiate auth       9am: CM notifiedd of Pt being medically stable for discharge today. Still awaiting acceptance from [address] [place] [address]. After acceptance, Pt will need insurance auth initiated. CM will continue to follow for discharge planning needs.          Jodie Fuller CM [employer]  
Transition of Care Plan:     RUR:18%  Disposition:SNF: [facility] pending insurance auth   If SNF or IPR: Date FOC offered:7/14  Date FOC received:7/14  Date authorization started with reference number:7/17  Date authorization received and expires:  Accepting facility: [facility]   Follow up appointments:  DME needed:  Transportation at Discharge: family vs BLS   [place] or means to access home:        IM Medicare Letter: to be provided   Is patient a [place] and connected with the [place]? If yes, was [employer] transfer form completed and VA notified? Caregiver Contact:Jenifer Fields (Child)   [phone]  Discharge Caregiver contacted prior to discharge? If Pt desires   Care Conference needed?:   no         CM followed-up with [address] [place] [address] [place] regarding insurance auth. CM was informed that Emily Marrero is still pending at this time. CM will continue to follow for discharge planning needs.          Christell Schwab, [place], KASI  [facility]  
controlled substances, med rec and dc summary, and AVS in packet. Reviewed and confirmed with facility, [address] [place] [address] [place] , can manage the patient care needs for the following:     Wilmer Howell with (X) only those applicable:  Medication:  [x]Medications are available at the facility  []IV Antibiotics    []Controlled Substance - hard copies available sent. []Weekly Labs    Equipment:  []CPAP/BiPAP  []Wound Vacuum  []Bran or Urinary Device  []PICC/Central Line  []Nebulizer  []Ventilator    Treatment:  [x]Isolation (for MRSA, VRE, etc.)  []Surgical Drain Management  []Tracheostomy Care  []Dressing Changes  []Dialysis with transportation  []PEG Care  []Oxygen  []Daily Weights for Heart Failure    Dietary:  []Any diet limitations  []Tube Feedings   []Total Parenteral Management (TPN)    Financial Resources:  []Medicaid Application Completed    []UAI Completed and copy given to pt/family  and copy given to pt/family  []A screening has previously been completed. []Level II Completed    [x] Private pay individual who will not become   financially eligible for Medicaid within 6 months from admission to a [address] [place] [address] facility. [] Individual refused to have screening conducted. []Medicaid Application Completed    []The screening denied because it was determined individual did not need/did not qualify for nursing facility level of care. [] Out of state residents seeking direct admission to a [facility].   [] Individuals who are inpatients of an out of state hospital, or in state or out of state veterans/ hospital and seek direct admission to a [facility]  [] Individuals who are pateints or residents of a state owned/operated facility that is licensed by [employer] of [employer] ([employer]) and seek direct admission to [address] [place] [address]  [] A screening not required for enrollment in [address] [place] [address] services as set out in [facility]-  [] [facility]) staff

## 2023-07-19 NOTE — DISCHARGE SUMMARY
Physician Discharge Summary     Patient ID:  Edgardo Ceja  [id number]  64 y.o.  1950    Admit date: 7/12/2023    Discharge date: 7/19/2023    Admission Diagnoses: Dehydration [E86.0]  Hyponatremia [E87.1]  Edema of both legs [R60.0]  SAMUEL (acute kidney injury) ([address]) [N17.9]  Bilateral lower extremity edema [R60.0]  Unable to care for self [Z78.9]    Discharge Diagnoses:  Principal Diagnosis Hyponatremia                                              Principal Problem:    Hyponatremia  Resolved Problems:    * No resolved hospital problems. *     Consults: nephrology    Significant Diagnostic Studies:     XR lumbar spine: Postsurgical and degenerative changes as above. No fracture. Duplex LE: No DVT. XR chest: No acute process on portable chest.    Hospital Course: Ms. Edgardo Ceja is a patient of mine who presented with the problems above. See the initial H and P for full details. Ms. Adri Dumont is a 71year old female with a history of atrial fibrillation, HTN, obesity, and peripheral edema. Presented to the emergency room with worsening swelling of legs, generalized fatigue, weakness and fall out her chair today. She complains of mild lumbar back pain. Patient has flat affect and did not participate much in examination. She denies chest pain, shortness of breath, lower extremity pain, dizziness or distress. Discussed with RN events overnight. By problem. .. Hyponatremia: Improved with hydration. Nephrology has been following as well. SAMUEL: Cr 3.10 at admission, now 0.9. Improving with hydration. Hypocalcemia: Improved. Altered mental status: Metabolic encophalopathy; Improved. Atrial fibrillation: Continue the amiodarone, Xarelto. Bilateral LE cellulitis: Cultures have returned with MRSA on anerobic culture, and Acinetobacter Baumannii complex and Alcligenes faecalis on aerobic culture. Antibiotics were changed to doxycycline and cipro, as of 7/18.   History of lymphedema: Ongoing
airway patent.

## 2023-07-19 NOTE — PLAN OF CARE
Problem: Occupational Therapy - Adult  Goal: By Discharge: Performs self-care activities at highest level of function for planned discharge setting. See evaluation for individualized goals. Description: FUNCTIONAL STATUS PRIOR TO ADMISSION:  Patient was here last month, went to SNF for rehab, and was discharged home. She indicates home health therapy had not started yet. Patient lives alone. Receives Help From: Home health, Family, Active : Yes (Pt reports that she drives short distances to the store, etc.)     HOME SUPPORT: Patient lives alone; her daughter lives nearby and is supportive. Occupational Therapy Goals:  Initiated 7/13/2023  1. Patient will perform grooming while standing at the sink with VSS and Contact Guard Assist within 7 day(s). 2.  Patient will perform upper body dressing and bathing with Stand by Assist within 7 day(s). 3.  Patient will perform lower body dressing and bathing with Minimal Assist within 7 day(s). 4.  Patient will perform toilet transfers with Contact Guard Assist  within 7 day(s). 5.  Patient will perform all aspects of toileting with Minimal Assist within 7 day(s). Outcome: Progressing    OCCUPATIONAL THERAPY TREATMENT  Patient: Gayle Fisher (85 y.o. female)  Date: 7/19/2023  Primary Diagnosis: Dehydration [E86.0]  Hyponatremia [E87.1]  Edema of both legs [R60.0]  SAMUEL (acute kidney injury) ([address]) [N17.9]  Bilateral lower extremity edema [R60.0]  Unable to care for self [Z78.9]       Precautions: Fall Risk                Chart, occupational therapy assessment, plan of care, and goals were reviewed. ASSESSMENT  Patient continues to benefit from skilled OT services and is progressing towards goals. Pt noted with progressive mobility/balance this date, evidenced by SBA level RW toileting and ADL item gathering; however, continues to benefit from safety with transfers and DME technique.   Pt noted to benefit from intermittent seated rest breaks and reports

## 2023-07-19 NOTE — PROGRESS NOTES
DISCHARGE TO SNF      Pt 72 yo female admitted for worsening  lower extremity edema, generalized fatigue and ground level fall. PMHx atrial fibrillation, HTN, obesity, and peripheral edema. This nurse attempted to call report to [facility] ([phone]) three times, unable to reach a nurse to take report. PIV and telemetry monitoring removed. Personal belongings collected and given to medical transport. Pt ambulated to stretcher using walker. Pt transported by [facility] to skilled nursing facility.

## 2023-07-19 NOTE — PROGRESS NOTES
End of Shift Note    Bedside shift change report given to RN (oncoming nurse) by Brendan Low RN (offgoing nurse). Report included the following information SBAR, Kardex, Intake/Output, MAR, and Recent Results    Shift worked:  0833-3418     Shift summary and any significant changes:     Upon shift change, patient had no IV access. Patient stated her IV infiltrated around lunch time. After attempts on my shift, called Ed tech and was able to get an ultra sound guided IV. Abx from am shift has been re timed d/t being given at pm shift. Wound care done @ 0320. Patient refused to be bath or cleaned up. No other significant changes. Concerns for physician to address:  None at this time. Zone phone for oncoming shift:   0000       Activity:     Number times ambulated in hallways past shift: 0  Number of times OOB to chair past shift: 0    Cardiac:   Cardiac Monitoring: Yes           Access:  Current line(s): PIV     Genitourinary:   Urinary status: voiding    Respiratory:      Chronic home O2 use?: NO  Incentive spirometer at bedside: YES       GI:     Current diet:  ADULT DIET; Regular  Passing flatus: YES  Tolerating current diet: YES       Pain Management:   Patient states pain is manageable on current regimen: YES    Skin:     Interventions: turn team, specialty bed, float heels, increase time out of bed, and PT/OT consult    Patient Safety:  Fall Score:    Interventions: bed/chair alarm, assistive device (walker, cane.  etc), gripper socks, pt to call before getting OOB, and stay with me (per policy)       Length of Stay:  Expected LOS: 5  Actual LOS: [address], [place]

## 2023-07-28 ENCOUNTER — TELEPHONE (OUTPATIENT)
Age: 73
End: 2023-07-28

## 2023-07-28 NOTE — TELEPHONE ENCOUNTER
Patient's Daughter, Clark Shipley states she needs a call back to discuss possible needs to re-schedule [facility]V HFU appt on 8/4/23. Please call to discuss & advise.  Thank you

## 2023-08-02 SDOH — ECONOMIC STABILITY: FOOD INSECURITY: WITHIN THE PAST 12 MONTHS, YOU WORRIED THAT YOUR FOOD WOULD RUN OUT BEFORE YOU GOT MONEY TO BUY MORE.: NEVER TRUE

## 2023-08-02 SDOH — ECONOMIC STABILITY: FOOD INSECURITY: WITHIN THE PAST 12 MONTHS, THE FOOD YOU BOUGHT JUST DIDN'T LAST AND YOU DIDN'T HAVE MONEY TO GET MORE.: NEVER TRUE

## 2023-08-02 SDOH — ECONOMIC STABILITY: TRANSPORTATION INSECURITY
IN THE PAST 12 MONTHS, HAS LACK OF TRANSPORTATION KEPT YOU FROM MEETINGS, WORK, OR FROM GETTING THINGS NEEDED FOR DAILY LIVING?: NO

## 2023-08-02 SDOH — ECONOMIC STABILITY: HOUSING INSECURITY
IN THE LAST 12 MONTHS, WAS THERE A TIME WHEN YOU DID NOT HAVE A STEADY PLACE TO SLEEP OR SLEPT IN A SHELTER (INCLUDING NOW)?: NO

## 2023-08-02 SDOH — ECONOMIC STABILITY: INCOME INSECURITY: HOW HARD IS IT FOR YOU TO PAY FOR THE VERY BASICS LIKE FOOD, HOUSING, MEDICAL CARE, AND HEATING?: NOT HARD AT ALL

## 2023-08-03 ENCOUNTER — TELEPHONE (OUTPATIENT)
Age: 73
End: 2023-08-03

## 2023-08-03 ENCOUNTER — TELEMEDICINE (OUTPATIENT)
Age: 73
End: 2023-08-03

## 2023-08-03 DIAGNOSIS — L03.119 CELLULITIS OF LOWER EXTREMITY, UNSPECIFIED LATERALITY: ICD-10-CM

## 2023-08-03 DIAGNOSIS — Z09 HOSPITAL DISCHARGE FOLLOW-UP: Primary | ICD-10-CM

## 2023-08-03 DIAGNOSIS — N17.9 AKI (ACUTE KIDNEY INJURY) (HCC): ICD-10-CM

## 2023-08-03 ASSESSMENT — ANXIETY QUESTIONNAIRES
IF YOU CHECKED OFF ANY PROBLEMS ON THIS QUESTIONNAIRE, HOW DIFFICULT HAVE THESE PROBLEMS MADE IT FOR YOU TO DO YOUR WORK, TAKE CARE OF THINGS AT HOME, OR GET ALONG WITH OTHER PEOPLE: NOT DIFFICULT AT ALL
5. BEING SO RESTLESS THAT IT IS HARD TO SIT STILL: 0
1. FEELING NERVOUS, ANXIOUS, OR ON EDGE: 0
4. TROUBLE RELAXING: 0
GAD7 TOTAL SCORE: 0
2. NOT BEING ABLE TO STOP OR CONTROL WORRYING: 0
7. FEELING AFRAID AS IF SOMETHING AWFUL MIGHT HAPPEN: 0
3. WORRYING TOO MUCH ABOUT DIFFERENT THINGS: 0
6. BECOMING EASILY ANNOYED OR IRRITABLE: 0

## 2023-08-03 ASSESSMENT — PATIENT HEALTH QUESTIONNAIRE - PHQ9
SUM OF ALL RESPONSES TO PHQ9 QUESTIONS 1 & 2: 2
SUM OF ALL RESPONSES TO PHQ QUESTIONS 1-9: 2
1. LITTLE INTEREST OR PLEASURE IN DOING THINGS: 1
SUM OF ALL RESPONSES TO PHQ QUESTIONS 1-9: 2
2. FEELING DOWN, DEPRESSED OR HOPELESS: 1

## 2023-08-03 NOTE — TELEPHONE ENCOUNTER
Left voicemail for Dorothy with [address] with Rogelio Socks per James Dave MD for Juno arnold     Requested call back if she needed any additional information

## 2023-08-03 NOTE — PROGRESS NOTES
Josef Pandey is a 71 y.o. female who was seen by synchronous (real-time) audio-video technology on 8/3/2023 for Follow-Up from Hospital        Progress Note       PROGRESS NOTE  Name: Josef Pandey   : 1950       ASSESSMENT/ PLAN:     Jesus Freitas was seen today for follow-up from hospital.    Diagnoses and all orders for this visit:    Hospital discharge follow-up  -     CT DISCHARGE MEDS RECONCILED W/ CURRENT OUTPATIENT MED LIST    SAMUEL (acute kidney injury) ([address])  -     Comprehensive Metabolic Panel    Cellulitis of lower extremity, unspecified laterality  -     CBC with Auto Differential    Hyponatremia: Improved with hydration. Recheck. Hypocalcemia: Improved. Altered mental status: Metabolic encophalopathy; Improved. Atrial fibrillation: Continue the amiodarone, Xarelto. Acinetobacter Baumannii complex and Alcligenes faecalis on aerobic culture. Antibiotics were changed to doxycycline and cipro, as of . History of lymphedema: Ongoing problem    Code: Full code. Follow-up and Dispositions    Return in about 3 months (around 11/3/2023). I have reviewed the patient's medications and risks/side effects/benefits were discussed. Diagnosis(-es) explained to patient and questions answered. Literature provided where appropriate. SUBJECTIVE  Ms. Josef Pandey presents today acutely for     Chief Complaint   Patient presents with    Follow-Up from Hospital       She was in the hospital on my service  - :     Hospital Course: Ms. Josef Pandey is a patient of mine who presented with the problems above. See the initial H and P for full details. Ms. Zachary Toth is a 71year old female with a history of atrial fibrillation, HTN, obesity, and peripheral edema. Presented to the emergency room with worsening swelling of legs, generalized fatigue, weakness and fall out her chair today. She complains of mild lumbar back pain.   Patient has flat

## 2023-08-03 NOTE — PROGRESS NOTES
1. \"Have you been to the ER, urgent care clinic since your last visit? Hospitalized since your last visit? Yes, 7/12/2023 dehydration, SAMUEL    2. \"Have you seen or consulted any other health care providers outside of the [address] [place] [address] since your last visit? \" No     3. For patients aged 43-73: Has the patient had a colonoscopy / FIT/ Cologuard? Yes - no [place] present      If the patient is female:    4. For patients aged 43-66: Has the patient had a mammogram within the past 2 years? Yes - no [place] present      5. For patients aged 21-65: Has the patient had a pap smear?  NA - based on age or sex

## 2023-08-03 NOTE — TELEPHONE ENCOUNTER
Abisai with [address] states pt Has swelling in both lower legs with drainage. Can she get order for unna Z zinc paste bandage? She can take a verbal now so she can wrap legs now.

## 2023-08-08 LAB
EKG ATRIAL RATE: 69 BPM
EKG DIAGNOSIS: NORMAL
EKG P AXIS: 99 DEGREES
EKG P-R INTERVAL: 224 MS
EKG Q-T INTERVAL: 430 MS
EKG QRS DURATION: 80 MS
EKG QTC CALCULATION (BAZETT): 460 MS
EKG R AXIS: 6 DEGREES
EKG T AXIS: 25 DEGREES
EKG VENTRICULAR RATE: 69 BPM

## 2023-08-16 ENCOUNTER — PATIENT MESSAGE (OUTPATIENT)
Age: 73
End: 2023-08-16

## 2023-08-16 ENCOUNTER — HOSPITAL ENCOUNTER (EMERGENCY)
Facility: HOSPITAL | Age: 73
Discharge: HOME | DRG: 948 | End: 2023-08-16
Attending: ORTHOPAEDIC SURGERY
Payer: MEDICARE

## 2023-08-16 ENCOUNTER — APPOINTMENT (OUTPATIENT)
Facility: HOSPITAL | Age: 73
DRG: 948 | End: 2023-08-16
Payer: MEDICARE

## 2023-08-16 VITALS
WEIGHT: 188 LBS | HEART RATE: 70 BPM | BODY MASS INDEX: 33.31 KG/M2 | SYSTOLIC BLOOD PRESSURE: 155 MMHG | HEIGHT: 63 IN | DIASTOLIC BLOOD PRESSURE: 64 MMHG | OXYGEN SATURATION: 98 % | TEMPERATURE: 98.3 F | RESPIRATION RATE: 16 BRPM
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
## 2023-08-16 DIAGNOSIS — S52.572A OTHER CLOSED INTRA-ARTICULAR FRACTURE OF DISTAL END OF LEFT RADIUS, INITIAL ENCOUNTER: Primary | ICD-10-CM

## 2023-08-16 DIAGNOSIS — S52.612A CLOSED DISPLACED FRACTURE OF STYLOID PROCESS OF LEFT ULNA, INITIAL ENCOUNTER: ICD-10-CM

## 2023-08-16 PROCEDURE — 99283 EMERGENCY DEPT VISIT LOW MDM: CPT

## 2023-08-16 PROCEDURE — 73130 X-RAY EXAM OF HAND: CPT

## 2023-08-16 PROCEDURE — 29125 APPL SHORT ARM SPLINT STATIC: CPT

## 2023-08-16 PROCEDURE — 73100 X-RAY EXAM OF WRIST: CPT

## 2023-08-16 RX ORDER — HYDROCODONE BITARTRATE AND ACETAMINOPHEN 5; 325 MG/1; MG/1
1 TABLET ORAL EVERY 8 HOURS PRN
Qty: 9 TABLET | Refills: 0 | Status: ON HOLD | OUTPATIENT
Start: 2023-08-16 | End: 2023-08-22 | Stop reason: HOSPADM

## 2023-08-16 RX ORDER — HYDROCODONE BITARTRATE AND ACETAMINOPHEN 5; 325 MG/1; MG/1
1 TABLET ORAL EVERY 8 HOURS PRN
Qty: 9 TABLET | Refills: 0 | Status: SHIPPED | OUTPATIENT
Start: 2023-08-16 | End: 2023-08-16 | Stop reason: SDUPTHER

## 2023-08-16 ASSESSMENT — ENCOUNTER SYMPTOMS: BACK PAIN: 0

## 2023-08-16 ASSESSMENT — VISUAL ACUITY: OU: 1

## 2023-08-16 ASSESSMENT — PAIN SCALES - GENERAL: PAINLEVEL_OUTOF10: 3

## 2023-08-16 ASSESSMENT — PAIN - FUNCTIONAL ASSESSMENT: PAIN_FUNCTIONAL_ASSESSMENT: 0-10

## 2023-08-16 NOTE — ED PROVIDER NOTES
not done, Shared Decision Making, Pt Expectation of Test or Tx.):      FINAL IMPRESSION     1. Other closed intra-articular fracture of distal end of left radius, initial encounter    2. Closed displaced fracture of styloid process of left ulna, initial encounter    3. Fall on same level from slipping, tripping or stumbling, initial encounter          DISPOSITION/PLAN   DISPOSITION Decision To Discharge 08/16/2023 05:46:01 PM    Discharge Note: The patient is stable for discharge home. The signs, symptoms, diagnosis, and discharge instructions have been discussed, understanding conveyed, and agreed upon. The patient is to follow up as recommended or return to ER should their symptoms worsen. PATIENT REFERRED TO:  Nori Manzanares MD  [address] [facility] 202  [facility] [id number] / HAND: call to schedule follow up    Kecia Pina MD  [address]  [facility]  [phone]    Call   [facility] / HAND: or call to schedule follow up       DISCHARGE MEDICATIONS:     Medication List        START taking these medications      HYDROcodone-acetaminophen 5-325 MG per tablet  Commonly known as: Taffy Dryer  Take 1 tablet by mouth every 8 hours as needed for Pain for up to 3 days.  SUPERVISING PHYSICIAN:  Don Toledo MD SAMUEL: [id number] Max Daily Amount: 3 tablets            ASK your doctor about these medications      amiodarone 400 MG tablet  Commonly known as: PACERONE  Take 1 tablet by mouth daily     atorvastatin 40 MG tablet  Commonly known as: LIPITOR     midodrine 5 MG tablet  Commonly known as: PROAMATINE  Take 1 tablet by mouth 2 times daily (with meals)     PARoxetine 10 MG tablet  Commonly known as: PAXIL  Take 1 tablet by mouth daily     potassium chloride 20 MEQ extended release tablet  Commonly known as: KLOR-CON M  Take 1 tablet by mouth daily     rivaroxaban 20 MG Tabs tablet  Commonly known as: Felicitas Triana               Where to Get

## 2023-08-16 NOTE — ED NOTES
I have reviewed the provider's instructions with the patient, answering all questions to her satisfaction. Pt wheeled to waiting room by ER staff to wait for family transportation.       Regina Briceno RN  08/16/23 2037

## 2023-08-16 NOTE — ED NOTES
RN assisted PA w/ splint application at this time. RN applied sling.       Regina Briceno RN  08/16/23 Gregory Cho

## 2023-08-17 ENCOUNTER — TELEPHONE (OUTPATIENT)
Age: 73
End: 2023-08-17

## 2023-08-17 NOTE — TELEPHONE ENCOUNTER
Analia with [facility] is asking for a verbal order related to wound care orders. She needs this yet today. Thanks.

## 2023-08-17 NOTE — TELEPHONE ENCOUNTER
GATOB per Mayela Dale MD / Amina Huffman    I spoke to Jacob Ozuna RN she wanted a VO for treatment. The patient has a wound on her left leg, Jacob Ozuna stated \"it is \"leaking\" . She would like to use  NS or wound cleanser to clean the area, then use zinc paste all over left leg/foot. Also I told her I talked to Shauna Mccollum RN earlier today about lab orders. Please let me know if you need anything else or have any questions. The office number is [phone].

## 2023-08-17 NOTE — TELEPHONE ENCOUNTER
I left a message with EVELYN Fisher at [facility] to call me back re: labs    Phone: [phone]

## 2023-08-19 ENCOUNTER — APPOINTMENT (OUTPATIENT)
Facility: HOSPITAL | Age: 73
DRG: 948 | End: 2023-08-19
Payer: MEDICARE

## 2023-08-19 ENCOUNTER — HOSPITAL ENCOUNTER (INPATIENT)
Facility: HOSPITAL | Age: 73
LOS: 6 days | Discharge: NURSING, REHAB OR LONG-TERM CARE | DRG: 948 | End: 2023-08-25
Attending: EMERGENCY MEDICINE | Admitting: STUDENT IN AN ORGANIZED HEALTH CARE EDUCATION/TRAINING PROGRAM
Payer: MEDICARE

## 2023-08-19 DIAGNOSIS — Z78.9 IMPAIRED MOBILITY AND ADLS: ICD-10-CM

## 2023-08-19 DIAGNOSIS — S62.102A LEFT WRIST FRACTURE, CLOSED, INITIAL ENCOUNTER: Primary | ICD-10-CM

## 2023-08-19 DIAGNOSIS — R29.6 FREQUENT FALLS: ICD-10-CM

## 2023-08-19 DIAGNOSIS — M79.604 ACUTE LEG PAIN, RIGHT: ICD-10-CM

## 2023-08-19 DIAGNOSIS — Z74.09 IMPAIRED MOBILITY AND ADLS: ICD-10-CM

## 2023-08-19 LAB
ALBUMIN SERPL-MCNC: 3.3 G/DL (ref 3.5–5)
ALBUMIN/GLOB SERPL: 1 (ref 1.1–2.2)
ALP SERPL-CCNC: 160 U/L (ref 45–117)
ALT SERPL-CCNC: 24 U/L (ref 12–78)
ANION GAP SERPL CALC-SCNC: 5 MMOL/L (ref 5–15)
APPEARANCE UR: CLEAR
AST SERPL-CCNC: 28 U/L (ref 15–37)
BACTERIA URNS QL MICRO: NEGATIVE /HPF
BASOPHILS # BLD: 0.1 K/UL (ref 0–0.1)
BASOPHILS NFR BLD: 1 % (ref 0–1)
BILIRUB SERPL-MCNC: 1.3 MG/DL (ref 0.2–1)
BILIRUB UR QL: NEGATIVE
BUN SERPL-MCNC: 14 MG/DL (ref 6–20)
BUN/CREAT SERPL: 16 (ref 12–20)
CALCIUM SERPL-MCNC: 8.8 MG/DL (ref 8.5–10.1)
CHLORIDE SERPL-SCNC: 108 MMOL/L (ref 97–108)
CK SERPL-CCNC: 69 U/L (ref 26–192)
CO2 SERPL-SCNC: 22 MMOL/L (ref 21–32)
COLOR UR: ABNORMAL
COMMENT:: NORMAL
CREAT SERPL-MCNC: 0.9 MG/DL (ref 0.55–1.02)
DIFFERENTIAL METHOD BLD: ABNORMAL
EOSINOPHIL # BLD: 0.1 K/UL (ref 0–0.4)
EOSINOPHIL NFR BLD: 1 % (ref 0–7)
EPITH CASTS URNS QL MICRO: ABNORMAL /LPF
ERYTHROCYTE [DISTWIDTH] IN BLOOD BY AUTOMATED COUNT: 14.2 % (ref 11.5–14.5)
GLOBULIN SER CALC-MCNC: 3.2 G/DL (ref 2–4)
GLUCOSE SERPL-MCNC: 96 MG/DL (ref 65–100)
GLUCOSE UR STRIP.AUTO-MCNC: NEGATIVE MG/DL
HCT VFR BLD AUTO: 30.2 % (ref 35–47)
HGB BLD-MCNC: 9.6 G/DL (ref 11.5–16)
HGB UR QL STRIP: NEGATIVE
HYALINE CASTS URNS QL MICRO: ABNORMAL /LPF (ref 0–2)
IMM GRANULOCYTES # BLD AUTO: 0 K/UL (ref 0–0.04)
IMM GRANULOCYTES NFR BLD AUTO: 0 % (ref 0–0.5)
KETONES UR QL STRIP.AUTO: ABNORMAL MG/DL
LEUKOCYTE ESTERASE UR QL STRIP.AUTO: NEGATIVE
LYMPHOCYTES # BLD: 0.9 K/UL (ref 0.8–3.5)
LYMPHOCYTES NFR BLD: 9 % (ref 12–49)
MCH RBC QN AUTO: 31.5 PG (ref 26–34)
MCHC RBC AUTO-ENTMCNC: 31.8 G/DL (ref 30–36.5)
MCV RBC AUTO: 99 FL (ref 80–99)
MONOCYTES # BLD: 0.8 K/UL (ref 0–1)
MONOCYTES NFR BLD: 9 % (ref 5–13)
NEUTS SEG # BLD: 7.5 K/UL (ref 1.8–8)
NEUTS SEG NFR BLD: 80 % (ref 32–75)
NITRITE UR QL STRIP.AUTO: NEGATIVE
NRBC # BLD: 0 K/UL (ref 0–0.01)
NRBC BLD-RTO: 0 PER 100 WBC
PH UR STRIP: 5.5 (ref 5–8)
PLATELET # BLD AUTO: 212 K/UL (ref 150–400)
PMV BLD AUTO: 10.7 FL (ref 8.9–12.9)
POTASSIUM SERPL-SCNC: 4.4 MMOL/L (ref 3.5–5.1)
PROT SERPL-MCNC: 6.5 G/DL (ref 6.4–8.2)
PROT UR STRIP-MCNC: NEGATIVE MG/DL
RBC # BLD AUTO: 3.05 M/UL (ref 3.8–5.2)
RBC #/AREA URNS HPF: ABNORMAL /HPF (ref 0–5)
SODIUM SERPL-SCNC: 135 MMOL/L (ref 136–145)
SP GR UR REFRACTOMETRY: 1.01
SPECIMEN HOLD: NORMAL
URINE CULTURE IF INDICATED: ABNORMAL
UROBILINOGEN UR QL STRIP.AUTO: 0.2 EU/DL (ref 0.2–1)
WBC # BLD AUTO: 9.3 K/UL (ref 3.6–11)
WBC URNS QL MICRO: ABNORMAL /HPF (ref 0–4)

## 2023-08-19 PROCEDURE — 72170 X-RAY EXAM OF PELVIS: CPT

## 2023-08-19 PROCEDURE — 6370000000 HC RX 637 (ALT 250 FOR IP): Performed by: EMERGENCY MEDICINE

## 2023-08-19 PROCEDURE — 81001 URINALYSIS AUTO W/SCOPE: CPT

## 2023-08-19 PROCEDURE — 80053 COMPREHEN METABOLIC PANEL: CPT

## 2023-08-19 PROCEDURE — 82550 ASSAY OF CK (CPK): CPT

## 2023-08-19 PROCEDURE — 99285 EMERGENCY DEPT VISIT HI MDM: CPT

## 2023-08-19 PROCEDURE — 93005 ELECTROCARDIOGRAM TRACING: CPT | Performed by: EMERGENCY MEDICINE

## 2023-08-19 PROCEDURE — 87040 BLOOD CULTURE FOR BACTERIA: CPT

## 2023-08-19 PROCEDURE — 36415 COLL VENOUS BLD VENIPUNCTURE: CPT

## 2023-08-19 PROCEDURE — 93971 EXTREMITY STUDY: CPT

## 2023-08-19 PROCEDURE — 1100000000 HC RM PRIVATE

## 2023-08-19 PROCEDURE — 70450 CT HEAD/BRAIN W/O DYE: CPT

## 2023-08-19 PROCEDURE — 85025 COMPLETE CBC W/AUTO DIFF WBC: CPT

## 2023-08-19 PROCEDURE — 73552 X-RAY EXAM OF FEMUR 2/>: CPT

## 2023-08-19 RX ORDER — POLYETHYLENE GLYCOL 3350 17 G/17G
17 POWDER, FOR SOLUTION ORAL DAILY PRN
Status: DISCONTINUED | OUTPATIENT
Start: 2023-08-19 | End: 2023-08-25 | Stop reason: HOSPADM

## 2023-08-19 RX ORDER — SODIUM CHLORIDE 0.9 % (FLUSH) 0.9 %
5-40 SYRINGE (ML) INJECTION PRN
Status: DISCONTINUED | OUTPATIENT
Start: 2023-08-19 | End: 2023-08-25 | Stop reason: HOSPADM

## 2023-08-19 RX ORDER — ONDANSETRON 2 MG/ML
4 INJECTION INTRAMUSCULAR; INTRAVENOUS EVERY 6 HOURS PRN
Status: DISCONTINUED | OUTPATIENT
Start: 2023-08-19 | End: 2023-08-25 | Stop reason: HOSPADM

## 2023-08-19 RX ORDER — SODIUM CHLORIDE 0.9 % (FLUSH) 0.9 %
5-40 SYRINGE (ML) INJECTION EVERY 12 HOURS SCHEDULED
Status: DISCONTINUED | OUTPATIENT
Start: 2023-08-19 | End: 2023-08-25 | Stop reason: HOSPADM

## 2023-08-19 RX ORDER — AMIODARONE HYDROCHLORIDE 200 MG/1
400 TABLET ORAL DAILY
Status: CANCELLED | OUTPATIENT
Start: 2023-08-19

## 2023-08-19 RX ORDER — ONDANSETRON 4 MG/1
4 TABLET, ORALLY DISINTEGRATING ORAL EVERY 8 HOURS PRN
Status: DISCONTINUED | OUTPATIENT
Start: 2023-08-19 | End: 2023-08-25 | Stop reason: HOSPADM

## 2023-08-19 RX ORDER — ACETAMINOPHEN 325 MG/1
650 TABLET ORAL EVERY 6 HOURS PRN
Status: DISCONTINUED | OUTPATIENT
Start: 2023-08-19 | End: 2023-08-25 | Stop reason: HOSPADM

## 2023-08-19 RX ORDER — ACETAMINOPHEN 500 MG
1000 TABLET ORAL
Status: COMPLETED | OUTPATIENT
Start: 2023-08-19 | End: 2023-08-19

## 2023-08-19 RX ORDER — SODIUM CHLORIDE 9 MG/ML
INJECTION, SOLUTION INTRAVENOUS PRN
Status: DISCONTINUED | OUTPATIENT
Start: 2023-08-19 | End: 2023-08-25 | Stop reason: HOSPADM

## 2023-08-19 RX ORDER — MORPHINE SULFATE 4 MG/ML
4 INJECTION, SOLUTION INTRAMUSCULAR; INTRAVENOUS EVERY 4 HOURS PRN
Status: DISCONTINUED | OUTPATIENT
Start: 2023-08-19 | End: 2023-08-25 | Stop reason: HOSPADM

## 2023-08-19 RX ORDER — MIDODRINE HYDROCHLORIDE 5 MG/1
5 TABLET ORAL EVERY 8 HOURS PRN
Status: CANCELLED | OUTPATIENT
Start: 2023-08-19

## 2023-08-19 RX ORDER — ATORVASTATIN CALCIUM 40 MG/1
40 TABLET, FILM COATED ORAL DAILY
Status: CANCELLED | OUTPATIENT
Start: 2023-08-19

## 2023-08-19 RX ORDER — ACETAMINOPHEN 650 MG/1
650 SUPPOSITORY RECTAL EVERY 6 HOURS PRN
Status: DISCONTINUED | OUTPATIENT
Start: 2023-08-19 | End: 2023-08-25 | Stop reason: HOSPADM

## 2023-08-19 RX ADMIN — ACETAMINOPHEN 1000 MG: 500 TABLET ORAL at 17:30

## 2023-08-19 ASSESSMENT — PAIN SCALES - GENERAL
PAINLEVEL_OUTOF10: 3
PAINLEVEL_OUTOF10: 3

## 2023-08-19 ASSESSMENT — PAIN - FUNCTIONAL ASSESSMENT: PAIN_FUNCTIONAL_ASSESSMENT: 0-10

## 2023-08-19 NOTE — H&P
Hospitalist Admission Note    NAME:   Antonia Juárez   : 1950   MRN: [id number]     Date/Time: 2023 4:39 PM    Patient PCP: Heaven Wakefield MD    ______________________________________________________________________  Given the patient's current clinical presentation, I have a high level of concern for decompensation if discharged from the emergency department. Complex decision making was performed, which includes reviewing the patient's available past medical records, laboratory results, and x-ray films. My assessment of this patient's clinical condition and my plan of care is as follows. Assessment / Plan:  Knee pain  Hip pain  Recurrent falls  Recent intra articular fracture of the distal end of the left radius  All x-rays were unremarkable  PT OT eval  Patient alert and oriented although she is on Xarelto will get CT head    Hx of lymphedema  Lower legs edema  Lower extremity Doppler where negative for DVT    A-fib  Continue Xarelto  Patient on amiodarone    Left leg wound  We will get wound care  No signs of infection so far  We will send blood cultures      History of hypertension  Patient on midodrine at home  We will hold that  Blood pressure above 120 now  Add midodrine as needed    Medical Decision Making:   I personally reviewed labs: CBC, BMP  I personally reviewed imaging: X-ray  I personally reviewed EKG: Yes  Toxic drug monitoring: Yes  Discussed case with: ED provider. After discussion I am in agreement that acuity of patient's medical condition necessitates hospital stay.       Code Status: Full code  DVT Prophylaxis: Patient on Xarelto  GI Prophylaxis: Not indicated  Baseline: Need help with ADL    Subjective:   CHIEF COMPLAINT: Falls    HISTORY OF PRESENT ILLNESS:     Luis Enrique Lemons is a 71 y.o.  female with PMHx significant for A-fib on Xarelto she had recurrent falls recently recent 1 and ended with fracture of her left wrist which is casted, patient stated yesterday again

## 2023-08-19 NOTE — ED NOTES
RN instructed to overhead page physical therapy to bedside per nursing supervisor      Vilma Garrett RN  08/19/23 0014

## 2023-08-19 NOTE — ED NOTES
Bedside and Verbal shift change report given to Svetlana (oncoming nurse) by Karen Torrez RN (offgoing nurse). Report included the following information Nurse Handoff Report, Index, ED Encounter Summary, Recent Results, Med Rec Status, and Neuro Assessment.        Zenia Arora RN  08/19/23 0901

## 2023-08-19 NOTE — PROGRESS NOTES
Physical Therapy    Received order from ED physician re: consult for pt s/p second fall one resulting in hand fx and now second with R leg and inability to get up this am. Pt lives alone. Spoke with ED physician who has taken over case and he is now seeking admission as pt is ed safe to d/c home alone. Will follow up on admission.     Rigo Lundy PT

## 2023-08-19 NOTE — ED PROVIDER NOTES
[facility] EMERGENCY DEPT  EMERGENCY DEPARTMENT ENCOUNTER       Pt Name: Katarzyna Pabon  MRN: [id number]  [address] [facility] 1950  Date of evaluation: 8/19/2023  Provider: Tre Navarrete MD   PCP: Yenifer Kingston MD  Note Started: 2:38 PM EDT 8/19/23     CHIEF COMPLAINT       Chief Complaint   Patient presents with    Hip Pain     Pt arrives with EMS cc of [facility] Wednesday . She was seen here and dxd with a left hand fracture but had no other complaints. Pt states that yesterday she began having left hip and knee pain and decreased mobility. Pt's legs are very edematous and pt states this is baseline but has gotten worse in the left leg over past few days. Fingers of fractured hand also presenting with ecchymosis that pt said started yesterday, neurovascular intact, cast that was placed Wednesday in place. HISTORY OF PRESENT ILLNESS: 1 or more elements      History From: Patient and daughter  HPI Limitations: None     Katarzyna Pabon is a 71 y.o. female with history of lymphedema, hypertension, A-fib on Xarelto who presents to ED with chief complaint of right leg pain. Patient had a fall last week and had a fracture in her left hand. She has a splint in place. Last night, she fell again in the kitchen and fell backwards onto her buttocks. She was able to get up and into her recliner, but then denies any fevers or chills. Denies any chest pain, shortness of breath, abdominal pain, nausea, vomiting, diarrhea, urinary symptoms. Patient lives alone. She fell asleep in her recliner and this morning, was unable to get up or bear weight on her legs. She reports chronic bilateral leg swelling, but right leg swelling is much worse than usual.         REVIEW OF SYSTEMS      Review of Systems     Positives and Pertinent negatives as per HPI.     PAST HISTORY     Past Medical History:  Past Medical History:   Diagnosis Date    Arrhythmia     ATRIAL FIB    Hypertension          Past Surgical History:  Past Surgical History:

## 2023-08-20 LAB
ANION GAP SERPL CALC-SCNC: 5 MMOL/L (ref 5–15)
BASOPHILS # BLD: 0.1 K/UL (ref 0–0.1)
BASOPHILS NFR BLD: 1 % (ref 0–1)
BUN SERPL-MCNC: 16 MG/DL (ref 6–20)
BUN/CREAT SERPL: 22 (ref 12–20)
CALCIUM SERPL-MCNC: 8.3 MG/DL (ref 8.5–10.1)
CHLORIDE SERPL-SCNC: 110 MMOL/L (ref 97–108)
CO2 SERPL-SCNC: 22 MMOL/L (ref 21–32)
CREAT SERPL-MCNC: 0.73 MG/DL (ref 0.55–1.02)
DIFFERENTIAL METHOD BLD: ABNORMAL
EOSINOPHIL # BLD: 0.6 K/UL (ref 0–0.4)
EOSINOPHIL NFR BLD: 6 % (ref 0–7)
ERYTHROCYTE [DISTWIDTH] IN BLOOD BY AUTOMATED COUNT: 14.4 % (ref 11.5–14.5)
GLUCOSE SERPL-MCNC: 80 MG/DL (ref 65–100)
HCT VFR BLD AUTO: 27.1 % (ref 35–47)
HGB BLD-MCNC: 8.4 G/DL (ref 11.5–16)
IMM GRANULOCYTES # BLD AUTO: 0 K/UL (ref 0–0.04)
IMM GRANULOCYTES NFR BLD AUTO: 0 % (ref 0–0.5)
LYMPHOCYTES # BLD: 1.5 K/UL (ref 0.8–3.5)
LYMPHOCYTES NFR BLD: 17 % (ref 12–49)
MCH RBC QN AUTO: 31.7 PG (ref 26–34)
MCHC RBC AUTO-ENTMCNC: 31 G/DL (ref 30–36.5)
MCV RBC AUTO: 102.3 FL (ref 80–99)
MONOCYTES # BLD: 0.9 K/UL (ref 0–1)
MONOCYTES NFR BLD: 10 % (ref 5–13)
NEUTS SEG # BLD: 5.7 K/UL (ref 1.8–8)
NEUTS SEG NFR BLD: 66 % (ref 32–75)
NRBC # BLD: 0 K/UL (ref 0–0.01)
NRBC BLD-RTO: 0 PER 100 WBC
PLATELET # BLD AUTO: 197 K/UL (ref 150–400)
PMV BLD AUTO: 11.1 FL (ref 8.9–12.9)
POTASSIUM SERPL-SCNC: 4.1 MMOL/L (ref 3.5–5.1)
RBC # BLD AUTO: 2.65 M/UL (ref 3.8–5.2)
SODIUM SERPL-SCNC: 137 MMOL/L (ref 136–145)
WBC # BLD AUTO: 8.7 K/UL (ref 3.6–11)

## 2023-08-20 PROCEDURE — 97165 OT EVAL LOW COMPLEX 30 MIN: CPT

## 2023-08-20 PROCEDURE — 80048 BASIC METABOLIC PNL TOTAL CA: CPT

## 2023-08-20 PROCEDURE — 97530 THERAPEUTIC ACTIVITIES: CPT

## 2023-08-20 PROCEDURE — 1100000000 HC RM PRIVATE

## 2023-08-20 PROCEDURE — 36415 COLL VENOUS BLD VENIPUNCTURE: CPT

## 2023-08-20 PROCEDURE — 97161 PT EVAL LOW COMPLEX 20 MIN: CPT

## 2023-08-20 PROCEDURE — 97535 SELF CARE MNGMENT TRAINING: CPT

## 2023-08-20 PROCEDURE — 6370000000 HC RX 637 (ALT 250 FOR IP): Performed by: STUDENT IN AN ORGANIZED HEALTH CARE EDUCATION/TRAINING PROGRAM

## 2023-08-20 PROCEDURE — 2580000003 HC RX 258: Performed by: STUDENT IN AN ORGANIZED HEALTH CARE EDUCATION/TRAINING PROGRAM

## 2023-08-20 PROCEDURE — 85025 COMPLETE CBC W/AUTO DIFF WBC: CPT

## 2023-08-20 RX ORDER — CASTOR OIL AND BALSAM, PERU 788; 87 MG/G; MG/G
OINTMENT TOPICAL 2 TIMES DAILY
Status: DISCONTINUED | OUTPATIENT
Start: 2023-08-20 | End: 2023-08-25 | Stop reason: HOSPADM

## 2023-08-20 RX ORDER — BUMETANIDE 1 MG/1
TABLET ORAL
COMMUNITY
Start: 2023-07-28

## 2023-08-20 RX ORDER — LISINOPRIL AND HYDROCHLOROTHIAZIDE 25; 20 MG/1; MG/1
TABLET ORAL
COMMUNITY
Start: 2023-06-23

## 2023-08-20 RX ADMIN — Medication: at 09:06

## 2023-08-20 RX ADMIN — Medication: at 21:21

## 2023-08-20 RX ADMIN — SODIUM CHLORIDE, PRESERVATIVE FREE 10 ML: 5 INJECTION INTRAVENOUS at 20:34

## 2023-08-20 RX ADMIN — SODIUM CHLORIDE, PRESERVATIVE FREE 10 ML: 5 INJECTION INTRAVENOUS at 09:06

## 2023-08-20 NOTE — PROGRESS NOTES
Bedside shift change report given to Norma Souza LPN (oncoming nurse) by Griffin Zavaleta  (offgoing nurse). Report included the following information Nurse Handoff Report, Intake/Output, MAR, and Cardiac Rhythm NSR to Sinus ella . Wound care and dressing done. RN placed wound care consult. Heels floated. Pt has limited movement on her left side, d/t her LUE fracture.  (Left arm with splint)       At handoff report, pt is sleeping in bed with visible rise and fall of chest.

## 2023-08-20 NOTE — PLAN OF CARE
Bilateral     CHOLECYSTECTOMY      COLONOSCOPY N/A 3/16/2023    COLONOSCOPY performed by Marian Long MD at [address]  3/16/2023    GASTRIC BYPASS SURGERY  01/01/1994    LUMBAR FUSION  2016    L4-5    OTHER SURGICAL HISTORY Right 2018    Fell and hit coffee table; bleeding--vascular proceedure R thigh. TONSILLECTOMY       Home Situation:  Social/Functional History  Lives With: Alone  Type of Home: Apartment  Home Layout: One level  Home Access: Stairs to enter with rails  Entrance Stairs - Number of Steps: 4  Entrance Stairs - Rails: Both  Bathroom Equipment: Grab bars around toilet, Toilet raiser, Grab bars in shower  Home Equipment: Lauren Lazier, rolling  Has the patient had two or more falls in the past year or any fall with injury in the past year?: Yes    Cognitive/Behavioral Status:  Orientation  Overall Orientation Status: Within Normal Limits  Orientation Level: Oriented X4  Cognition  Overall Cognitive Status: WNL    Vision/Perceptual:          Vision  Vision: Impaired  Vision Exceptions: Wears glasses at all times       Strength:    Strength: Generally decreased, functional    Tone & Sensation:   Tone: Normal  Sensation: Intact    Coordination:  Coordination: Within functional limits    Range Of Motion:  AROM: Generally decreased, functional       Functional Mobility:  Bed Mobility:     Bed Mobility Training  Bed Mobility Training: Yes  Rolling: Moderate assistance;Assist X2;Additional time  Supine to Sit: Moderate assistance;Assist X2;Additional time  Sit to Supine: Moderate assistance;Assist X2;Additional time  Scooting: Moderate assistance;Assist X2;Additional time  Transfers:     Transfer Training  Transfer Training: Yes  Sit to Stand:  Moderate assistance;Assist X2;Additional time  Stand to Sit: Moderate assistance;Assist X2;Additional time  Balance:               Balance  Sitting: Intact  Standing: Impaired  Standing - Static: Poor  Standing - Dynamic:  (unable to Discharge Destination After Acute Care Hospitalization in Select Patient Groups: A Retrospective, Observational Study. Arch Rehabil Res Clin Transl. 2022 Jul 16;4(3):265386. doi: 10.1016/j.arrct. 1618.734553. PMID: 55250185; PMCID: KCO3268473. 4. Leatha Duffy Ni P. AM-PAC Short Forms Manual 4.0. Revised 2/2020. Pain Rating:  Patient with c/o left femur pain    Activity Tolerance:   Good    After treatment:   Patient left in no apparent distress in bed, Call bell within reach, Side rails x3, Heels elevated for pressure relief, and unable to set bed alarm due to device malfunction. COMMUNICATION/EDUCATION:   The patient's plan of care was discussed with: occupational therapist and registered nurse    Patient Education  Education Given To: Patient  Education Provided: Role of Therapy;Transfer Training;Plan of Care; Fall Prevention Strategies;Precautions  Education Method: Verbal  Barriers to Learning: None  Education Outcome: Verbalized understanding;Continued education needed    Thank you for this referral.  Julio Hughes PT, DPT  Minutes: 21      Physical Therapy Evaluation Charge Determination   History Examination Presentation Decision-Making   MEDIUM  Complexity : 1-2 comorbidities / personal factors will impact the outcome/ POC  LOW Complexity : 1-2 Standardized tests and measures addressing body structure, function, activity limitation and / or participation in recreation  LOW Complexity : Stable, uncomplicated  AM-PAC  MEDIUM   Based on the above components, the patient evaluation is determined to be of the following complexity level: Low

## 2023-08-20 NOTE — ED NOTES
Report given to [facility].  SBAR and questions addressed at this time     Jose Luis Hopkins RN  08/19/23 1074

## 2023-08-21 LAB
ANION GAP SERPL CALC-SCNC: 6 MMOL/L (ref 5–15)
BUN SERPL-MCNC: 17 MG/DL (ref 6–20)
BUN/CREAT SERPL: 21 (ref 12–20)
CALCIUM SERPL-MCNC: 8.1 MG/DL (ref 8.5–10.1)
CHLORIDE SERPL-SCNC: 111 MMOL/L (ref 97–108)
CO2 SERPL-SCNC: 21 MMOL/L (ref 21–32)
CREAT SERPL-MCNC: 0.81 MG/DL (ref 0.55–1.02)
EKG ATRIAL RATE: 66 BPM
EKG DIAGNOSIS: NORMAL
EKG P AXIS: 105 DEGREES
EKG P-R INTERVAL: 214 MS
EKG Q-T INTERVAL: 430 MS
EKG QRS DURATION: 76 MS
EKG QTC CALCULATION (BAZETT): 450 MS
EKG R AXIS: 12 DEGREES
EKG T AXIS: 20 DEGREES
EKG VENTRICULAR RATE: 66 BPM
ERYTHROCYTE [DISTWIDTH] IN BLOOD BY AUTOMATED COUNT: 14.4 % (ref 11.5–14.5)
GLUCOSE SERPL-MCNC: 64 MG/DL (ref 65–100)
HCT VFR BLD AUTO: 27.4 % (ref 35–47)
HGB BLD-MCNC: 8.5 G/DL (ref 11.5–16)
MCH RBC QN AUTO: 31.8 PG (ref 26–34)
MCHC RBC AUTO-ENTMCNC: 31 G/DL (ref 30–36.5)
MCV RBC AUTO: 102.6 FL (ref 80–99)
NRBC # BLD: 0 K/UL (ref 0–0.01)
NRBC BLD-RTO: 0 PER 100 WBC
PLATELET # BLD AUTO: 193 K/UL (ref 150–400)
PMV BLD AUTO: 11.8 FL (ref 8.9–12.9)
POTASSIUM SERPL-SCNC: 4.1 MMOL/L (ref 3.5–5.1)
RBC # BLD AUTO: 2.67 M/UL (ref 3.8–5.2)
SODIUM SERPL-SCNC: 138 MMOL/L (ref 136–145)
WBC # BLD AUTO: 8.4 K/UL (ref 3.6–11)

## 2023-08-21 PROCEDURE — 36415 COLL VENOUS BLD VENIPUNCTURE: CPT

## 2023-08-21 PROCEDURE — 85027 COMPLETE CBC AUTOMATED: CPT

## 2023-08-21 PROCEDURE — 6370000000 HC RX 637 (ALT 250 FOR IP): Performed by: STUDENT IN AN ORGANIZED HEALTH CARE EDUCATION/TRAINING PROGRAM

## 2023-08-21 PROCEDURE — 97535 SELF CARE MNGMENT TRAINING: CPT

## 2023-08-21 PROCEDURE — 2580000003 HC RX 258: Performed by: STUDENT IN AN ORGANIZED HEALTH CARE EDUCATION/TRAINING PROGRAM

## 2023-08-21 PROCEDURE — 1100000000 HC RM PRIVATE

## 2023-08-21 PROCEDURE — 80048 BASIC METABOLIC PNL TOTAL CA: CPT

## 2023-08-21 RX ADMIN — Medication: at 12:29

## 2023-08-21 RX ADMIN — SODIUM CHLORIDE, PRESERVATIVE FREE 10 ML: 5 INJECTION INTRAVENOUS at 23:25

## 2023-08-21 RX ADMIN — ACETAMINOPHEN 650 MG: 325 TABLET ORAL at 23:22

## 2023-08-21 RX ADMIN — SODIUM CHLORIDE, PRESERVATIVE FREE 10 ML: 5 INJECTION INTRAVENOUS at 11:45

## 2023-08-21 RX ADMIN — ACETAMINOPHEN 650 MG: 325 TABLET ORAL at 05:36

## 2023-08-21 RX ADMIN — Medication: at 23:24

## 2023-08-21 RX ADMIN — POLYETHYLENE GLYCOL 3350 17 G: 17 POWDER, FOR SOLUTION ORAL at 18:45

## 2023-08-21 ASSESSMENT — PAIN DESCRIPTION - DESCRIPTORS
DESCRIPTORS: ACHING
DESCRIPTORS: ACHING;DISCOMFORT

## 2023-08-21 ASSESSMENT — PAIN SCALES - GENERAL
PAINLEVEL_OUTOF10: 5
PAINLEVEL_OUTOF10: 5

## 2023-08-21 ASSESSMENT — PAIN DESCRIPTION - ORIENTATION
ORIENTATION: MID
ORIENTATION: LEFT

## 2023-08-21 ASSESSMENT — PAIN - FUNCTIONAL ASSESSMENT: PAIN_FUNCTIONAL_ASSESSMENT: ACTIVITIES ARE NOT PREVENTED

## 2023-08-21 ASSESSMENT — PAIN DESCRIPTION - LOCATION
LOCATION: HEAD
LOCATION: ARM

## 2023-08-21 NOTE — ED NOTES
Attempted to call report, no answer.  Hand off given to next shift RN, Cherylene Rima, RN  08/21/23 0292

## 2023-08-21 NOTE — PROGRESS NOTES
Physician Progress Note      PATIENTDiamond Hamman Sherlon Gibbons  CSN #:                  [id number]  :                       1950  ADMIT DATE:       2023 11:02 AM  DISCH DATE:  RESPONDING  PROVIDER #:        Geoff Foster MD          QUERY TEXT:    Patient admitted with left hip pain, noted to have \"POA Right buttock Stage 2   PI\" per Wound Care consult note dated . If possible, please document in   progress notes and discharge summary if you are evaluating and/or treating any   of the following: The medical record reflects the following:  Risk Factors: Hx:  lymphedema. Diamond Hamman Diamond Hamman C/o Left hip pain S/p GLF  Clinical Indicators:     AP: Left leg wound  We will get wound care  No signs of infection so far  We will send blood cultures       Wound Care RN PN:  POA BLE edema - no open wounds but erythema to left shin:  POA Right buttock Stage 2 PI  Mid sacrum blanchable erythema  Left buttock ecchymosis    Treatment: Wound Care consult; Sacrum and buttocks: cleanse with soap and   water when soiled; Apply Venelex ointment BID to clean dry skin; Turn q2 hrs; Incontinence checks; Float heels and elevate BLE in and out of bed    Thank you,    Faye Muro  CDI  Options provided:  -- Right buttock stage 2 pressure ulcer POA  -- Other - I will add my own diagnosis  -- Disagree - Not applicable / Not valid  -- Disagree - Clinically unable to determine / Unknown  -- Refer to Clinical Documentation Reviewer    PROVIDER RESPONSE TEXT:    This patient has right buttock stage 2 pressure ulcer POA. Query created by: Faye Muro on 2023 1:42 PM      Electronically signed by:   Geoff Foster MD 2023 3:13 PM

## 2023-08-21 NOTE — CARE COORDINATION
Chart reviewed. CM attempted to meet with pt this PM to complete assessment and discuss discharge planning. Pt was using the bathroom and medical staff at bedside to assist. Will defer attempt and continue to follow.     Sally Liu, MSW   Care Manager, [address] [place] [address]

## 2023-08-21 NOTE — PROGRESS NOTES
Goals:     Goals: PO intake 75% or greater, by next RD assessment       Nutrition Monitoring and Evaluation:   Behavioral-Environmental Outcomes: None Identified  Food/Nutrient Intake Outcomes: Food and Nutrient Intake  Physical Signs/Symptoms Outcomes: Biochemical Data, Weight    Discharge Planning:    Continue current diet     Basilia Richardson RD  Contact: ext [phone]

## 2023-08-21 NOTE — PLAN OF CARE
Problem: Discharge Planning  Goal: Discharge to home or other facility with appropriate resources  8/20/2023 2325 by Ariana Oh RN  Outcome: Progressing  8/20/2023 1552 by Roney Tejada RN  Outcome: Progressing  Flowsheets (Taken 8/20/2023 0800 by Milton Nunez LPN)  Discharge to home or other facility with appropriate resources: Identify barriers to discharge with patient and caregiver     Problem: Safety - Adult  Goal: Free from fall injury  Outcome: Progressing     Problem: Physical Therapy - Adult  Goal: By Discharge: Performs mobility at highest level of function for planned discharge setting. See evaluation for individualized goals. Description: FUNCTIONAL STATUS PRIOR TO ADMISSION: Patient was modified independent using a rolling walker and single point cane for functional mobility. She has a history of multiple falls. Reports increased difficulty managing at home and unable to use RW since fall 8/16/2023 and left wrist fracture. HOME SUPPORT PRIOR TO ADMISSION: The patient lived alone with local daughter to provide some assistance. Daughter works full time. Physical Therapy Goals  Initiated 8/20/2023  1. Patient will move from supine to sit and sit to supine, scoot up and down, and roll side to side in bed with minimal assistance within 7 day(s). 2.  Patient will perform sit to stand with minimal assistance within 7 day(s). 3.  Patient will transfer from bed to chair and chair to bed with minimal assistance using the least restrictive device within 7 day(s). 4.  Patient will ambulate with minimal assistance for 25 feet with the least restrictive device within 7 day(s). 8/20/2023 1211 by Shawn Lemus PT  Outcome: Progressing     Problem: Occupational Therapy - Adult  Goal: By Discharge: Performs self-care activities at highest level of function for planned discharge setting. See evaluation for individualized goals.   Description: FUNCTIONAL STATUS PRIOR TO ADMISSION:

## 2023-08-21 NOTE — PROGRESS NOTES
Bedside shift change report given to [facility] / Gene Baker RN  (oncoming nurse) by Ellen Cary  (offgoing nurse). Report included the following information Nurse Handoff Report, Intake/Output, MAR, and Cardiac Rhythm NSR to sinus bradycardia  . Pt slept most of the night. Pt declined subsequent turning last night, She said she's comfortable with the pillow on her right side.     At handoff report, pt is sleeping in bed with visible rise and fall of chest. Heels floated

## 2023-08-21 NOTE — CONSULTS
ORTHOPAEDIC CONSULT NOTE    Subjective:     Date of Consultation:  August 21, 2023      Melanie Kay is a 71 y.o. female who is being seen for left distal radius/ulna fracture from 8/16/23. Patient admitted for recurrent falls last night. Patient was to see Dr Luzmaria Palacio in clinic today for follow up. Patient denies any new injury to her wrist despite her falls. She denies any new pain. She does admit to taking her splint of, but states she did not move her wrist when she did so. She denies any sensation changes or loss of function of her digits. Family at bedside. Patient Active Problem List    Diagnosis Date Noted    Recurrent falls 08/19/2023    Hyponatremia 07/12/2023    Pain in toes of both feet 06/21/2023    Dermatophytosis of nail 06/21/2023    Palliative care encounter 06/15/2023    Increase in creatinine 06/15/2023    Arterial hypotension 06/15/2023    Sepsis ([address]) 06/14/2023    Lumbar stenosis 11/15/2011     Family History   Problem Relation Age of Onset    Heart Failure Mother       Social History     Tobacco Use    Smoking status: Never    Smokeless tobacco: Never   Substance Use Topics    Alcohol use: No     Past Medical History:   Diagnosis Date    Arrhythmia     ATRIAL FIB    Hypertension       Past Surgical History:   Procedure Laterality Date    ABLATION OF DYSRHYTHMIC FOCUS      APPENDECTOMY      CATARACT REMOVAL Bilateral     CHOLECYSTECTOMY      COLONOSCOPY N/A 3/16/2023    COLONOSCOPY performed by Yu Carballo MD at [address]  3/16/2023    GASTRIC BYPASS SURGERY  01/01/1994    LUMBAR FUSION  2016    L4-5    OTHER SURGICAL HISTORY Right 2018    Alphonsus Barters and hit coffee table; bleeding--vascular proceedure R thigh. TONSILLECTOMY        Prior to Admission medications    Medication Sig Start Date End Date Taking?  Authorizing Provider   bumetanide (BUMEX) 1 MG tablet  7/28/23   Historical Provider, MD   lisinopril-hydroCHLOROthiazide (PRINZIDE;ZESTORETIC) 20-25 fracture. No carpal or  metacarpal fracture. 2. Multifocal osteoarthritis and erosive osteoarthritis. Labs:   Recent Results (from the past 24 hour(s))   CBC    Collection Time: 08/21/23  7:03 AM   Result Value Ref Range    WBC 8.4 3.6 - 11.0 K/uL    RBC 2.67 (L) 3.80 - 5.20 M/uL    Hemoglobin 8.5 (L) 11.5 - 16.0 g/dL    Hematocrit 27.4 (L) 35.0 - 47.0 %    .6 (H) 80.0 - 99.0 FL    MCH 31.8 26.0 - 34.0 PG    MCHC 31.0 30.0 - 36.5 g/dL    RDW 14.4 11.5 - 14.5 %    Platelets 906 656 - 953 K/uL    MPV 11.8 8.9 - 12.9 FL    Nucleated RBCs 0.0 0  WBC    nRBC 0.00 0.00 - 0.01 K/uL   Basic Metabolic Panel    Collection Time: 08/21/23  7:03 AM   Result Value Ref Range    Sodium 138 136 - 145 mmol/L    Potassium 4.1 3.5 - 5.1 mmol/L    Chloride 111 (H) 97 - 108 mmol/L    CO2 21 21 - 32 mmol/L    Anion Gap 6 5 - 15 mmol/L    Glucose 64 (L) 65 - 100 mg/dL    BUN 17 6 - 20 MG/DL    Creatinine 0.81 0.55 - 1.02 MG/DL    Bun/Cre Ratio 21 (H) 12 - 20      Est, Glom Filt Rate >60 >60 ml/min/1.73m2    Calcium 8.1 (L) 8.5 - 10.1 MG/DL         Impression:     Patient Active Problem List    Diagnosis Date Noted    Recurrent falls 08/19/2023    Hyponatremia 07/12/2023    Pain in toes of both feet 06/21/2023    Dermatophytosis of nail 06/21/2023    Palliative care encounter 06/15/2023    Increase in creatinine 06/15/2023    Arterial hypotension 06/15/2023    Sepsis ([address]) 06/14/2023    Lumbar stenosis 11/15/2011     Principal Problem:    Recurrent falls  Resolved Problems:    * No resolved hospital problems. *      Plan:   Left distal radius and ulna fracture    - NWB Left wrist/hand  - Sling for comfort; okay to remove if needed. - Sugar tong splint placed LUE  - F/U Outpatient with Dr Melvin Moon or Amira Davey within next 1-2 weeks. - No plans for intervention from orthopedic standpoint while inpatient. Dr. Georgette Elias aware and agrees with plan as above.         BRANDY Venegas  [employer]

## 2023-08-21 NOTE — PLAN OF CARE
Problem: Occupational Therapy - Adult  Goal: By Discharge: Performs self-care activities at highest level of function for planned discharge setting. See evaluation for individualized goals. Description: FUNCTIONAL STATUS PRIOR TO ADMISSION:  Patient was ambulatory using cane for transfers and RW for ambulation. Patient typically independent in all ADLs and IADLs. Note recent ED visit after fall with LUE splinted, second fall sustained just prior to this admission. HOME SUPPORT: Patient lived alone; supportive daughter local.    Occupational Therapy Goals:  Initiated 8/20/2023  1. Patient will perform standing grooming with Minimal Assist within 7 day(s). 2.  Patient will perform upper body dressing and bathing with Minimal Assist within 7 day(s). 3.  Patient will perform lower body dressing and bathing with Minimal Assist within 7 day(s). 4.  Patient will perform toilet transfers to [facility] with Minimal Assist using DME prn within 7 day(s). 5.  Patient will perform all aspects of toileting with Minimal Assist using DME prn within 7 day(s). 6.  Patient will utilize energy conservation and fall prevention techniques during functional activities with verbal cues within 7 day(s). Outcome: Progressing   OCCUPATIONAL THERAPY TREATMENT  Patient: Shaq Montesinos (86 y.o. female)  Date: 8/21/2023  Primary Diagnosis: Frequent falls [R29.6]  Recurrent falls [R29.6]  Left wrist fracture, closed, initial encounter [S62.102A]       Precautions: Weight Bearing, Fall Risk, Contact Precautions (MRSA)       Left Upper Extremity Weight Bearing: Non Weight Bearing        Chart, occupational therapy assessment, plan of care, and goals were reviewed. ASSESSMENT  Patient continues to benefit from skilled OT services and is progressing towards goals. Good participation despite constipation and feeling, \"weak all over. \" Tolerated B shoulder flexion in prep for UE ADLs, but only in reps of 3 on L and 5 on R.  Good return

## 2023-08-21 NOTE — WOUND CARE
Wound care nurse consult for POA stage 2 pressure injury left buttocks and blanchable redness on mid buttocks. Chart reviewed  72 y/o CF admitted for recurrent falls. Past Medical History:   Diagnosis Date    Arrhythmia     ATRIAL FIB    Hypertension      Past Surgical History:   Procedure Laterality Date    ABLATION OF DYSRHYTHMIC FOCUS      APPENDECTOMY      CATARACT REMOVAL Bilateral     CHOLECYSTECTOMY      COLONOSCOPY N/A 3/16/2023    COLONOSCOPY performed by Larissa Aguilar MD at [address]  3/16/2023    GASTRIC BYPASS SURGERY  01/01/1994    LUMBAR FUSION  2016    L4-5    OTHER SURGICAL HISTORY Right 2018    Fell and hit coffee table; bleeding--vascular proceedure R thigh. TONSILLECTOMY       5'3\", 188 lbs BMI=33.30 kg/m2 Reference: BMI greater than 30 is classified as obesity and greater than 40 is classified as morbid obesity   POA BLE edema - no open wounds but erythema to left shin:          POA Right buttock Stage 2 PI    Mid sacrum blanchable erythema    Left buttock ecchymosis      Recommend:    Sacrum and buttocks: cleanse with soap and water when soiled. Apply Venelex ointment BID to clean dry skin.   Turn q2 hrs  Incontinence checks  Float heels and elevate BLE in and out of bed    FLY CLARK [address] RN, [facility] [address] RN, [facility]

## 2023-08-22 LAB
ANION GAP SERPL CALC-SCNC: 5 MMOL/L (ref 5–15)
BUN SERPL-MCNC: 17 MG/DL (ref 6–20)
BUN/CREAT SERPL: 23 (ref 12–20)
CALCIUM SERPL-MCNC: 8.3 MG/DL (ref 8.5–10.1)
CHLORIDE SERPL-SCNC: 111 MMOL/L (ref 97–108)
CO2 SERPL-SCNC: 23 MMOL/L (ref 21–32)
CREAT SERPL-MCNC: 0.75 MG/DL (ref 0.55–1.02)
ERYTHROCYTE [DISTWIDTH] IN BLOOD BY AUTOMATED COUNT: 14.6 % (ref 11.5–14.5)
GLUCOSE SERPL-MCNC: 111 MG/DL (ref 65–100)
HCT VFR BLD AUTO: 28.9 % (ref 35–47)
HGB BLD-MCNC: 9.1 G/DL (ref 11.5–16)
MCH RBC QN AUTO: 31.7 PG (ref 26–34)
MCHC RBC AUTO-ENTMCNC: 31.5 G/DL (ref 30–36.5)
MCV RBC AUTO: 100.7 FL (ref 80–99)
NRBC # BLD: 0 K/UL (ref 0–0.01)
NRBC BLD-RTO: 0 PER 100 WBC
PLATELET # BLD AUTO: 237 K/UL (ref 150–400)
PMV BLD AUTO: 10.6 FL (ref 8.9–12.9)
POTASSIUM SERPL-SCNC: 3.8 MMOL/L (ref 3.5–5.1)
RBC # BLD AUTO: 2.87 M/UL (ref 3.8–5.2)
SODIUM SERPL-SCNC: 139 MMOL/L (ref 136–145)
WBC # BLD AUTO: 12.5 K/UL (ref 3.6–11)

## 2023-08-22 PROCEDURE — 97535 SELF CARE MNGMENT TRAINING: CPT | Performed by: OCCUPATIONAL THERAPIST

## 2023-08-22 PROCEDURE — 97530 THERAPEUTIC ACTIVITIES: CPT | Performed by: PHYSICAL THERAPIST

## 2023-08-22 PROCEDURE — 2580000003 HC RX 258: Performed by: STUDENT IN AN ORGANIZED HEALTH CARE EDUCATION/TRAINING PROGRAM

## 2023-08-22 PROCEDURE — 80048 BASIC METABOLIC PNL TOTAL CA: CPT

## 2023-08-22 PROCEDURE — 36415 COLL VENOUS BLD VENIPUNCTURE: CPT

## 2023-08-22 PROCEDURE — 6370000000 HC RX 637 (ALT 250 FOR IP): Performed by: STUDENT IN AN ORGANIZED HEALTH CARE EDUCATION/TRAINING PROGRAM

## 2023-08-22 PROCEDURE — 85027 COMPLETE CBC AUTOMATED: CPT

## 2023-08-22 PROCEDURE — 1100000000 HC RM PRIVATE

## 2023-08-22 PROCEDURE — 97110 THERAPEUTIC EXERCISES: CPT | Performed by: PHYSICAL THERAPIST

## 2023-08-22 RX ADMIN — Medication: at 09:08

## 2023-08-22 RX ADMIN — SODIUM CHLORIDE, PRESERVATIVE FREE 10 ML: 5 INJECTION INTRAVENOUS at 09:08

## 2023-08-22 RX ADMIN — ACETAMINOPHEN 650 MG: 325 TABLET ORAL at 20:32

## 2023-08-22 RX ADMIN — Medication: at 21:00

## 2023-08-22 RX ADMIN — SODIUM CHLORIDE, PRESERVATIVE FREE 10 ML: 5 INJECTION INTRAVENOUS at 21:00

## 2023-08-22 ASSESSMENT — PAIN DESCRIPTION - DESCRIPTORS: DESCRIPTORS: ACHING

## 2023-08-22 ASSESSMENT — PAIN SCALES - GENERAL
PAINLEVEL_OUTOF10: 4
PAINLEVEL_OUTOF10: 0

## 2023-08-22 ASSESSMENT — PAIN DESCRIPTION - LOCATION: LOCATION: HEAD;ARM

## 2023-08-22 ASSESSMENT — PAIN DESCRIPTION - ORIENTATION: ORIENTATION: LEFT

## 2023-08-22 NOTE — PLAN OF CARE
Problem: Occupational Therapy - Adult  Goal: By Discharge: Performs self-care activities at highest level of function for planned discharge setting. See evaluation for individualized goals. Description: FUNCTIONAL STATUS PRIOR TO ADMISSION:  Patient was ambulatory using cane for transfers and RW for ambulation. Patient typically independent in all ADLs and IADLs. Note recent ED visit after fall with LUE splinted, second fall sustained just prior to this admission. HOME SUPPORT: Patient lived alone; supportive daughter local.    Occupational Therapy Goals:  Initiated 8/20/2023  1. Patient will perform standing grooming with Minimal Assist within 7 day(s). 2.  Patient will perform upper body dressing and bathing with Minimal Assist within 7 day(s). 3.  Patient will perform lower body dressing and bathing with Minimal Assist within 7 day(s). 4.  Patient will perform toilet transfers to [facility] with Minimal Assist using DME prn within 7 day(s). 5.  Patient will perform all aspects of toileting with Minimal Assist using DME prn within 7 day(s). 6.  Patient will utilize energy conservation and fall prevention techniques during functional activities with verbal cues within 7 day(s). Outcome: Progressing   OCCUPATIONAL THERAPY TREATMENT  Patient: Moy Camarena (69 y.o. female)  Date: 8/22/2023  Primary Diagnosis: Frequent falls [R29.6]  Recurrent falls [R29.6]  Left wrist fracture, closed, initial encounter [S62.102A]       Precautions: Weight Bearing, Fall Risk, Contact Precautions (MRSA)       Left Upper Extremity Weight Bearing: Non Weight Bearing        Chart, occupational therapy assessment, plan of care, and goals were reviewed. ASSESSMENT  Patient continues to benefit from skilled OT services and is slowly progressing towards goals. Upon arrival, pt agreeable to treatment. She was unaware of bowel incontinence requiring total assistance for cleaning.   Pt was able to mobilize to EOB with generally

## 2023-08-22 NOTE — CARE COORDINATION
Care Management Initial Assessment       RUR: 22%  Readmission? No  1st IM letter given? Yes   1st  letter given: No        08/22/23 1604   Service Assessment   Patient Orientation Alert and Oriented   Cognition Alert   History Provided By Patient   Primary [facility]   Patient's Healthcare Decision Maker is: Legal Next of Kin   PCP Verified by CM Yes   Last Visit to PCP Within last year   Prior Functional Level Independent in ADLs/IADLs   Current Functional Level Assistance with the following:;Bathing;Dressing; Toileting;Cooking;Housework; Shopping;Mobility   Can patient return to prior living arrangement Yes   Ability to make needs known: Good   Family able to assist with home care needs: Yes   Would you like for me to discuss the discharge plan with any other family members/significant others, and if so, who? Yes  (daughter)   Financial Resources Medicare   Social/Functional History   Lives With Alone   Type of [address] [facility] One level   [address] to enter with rails   Entrance Stairs - Number of Steps [address] [facility] Help From [address] [facility] Needs assistance   Ambulation Assistance Independent   Transfer Assistance Independent   Active  Yes   Occupation Retired   Discharge Planning   Type of [address] [facility] Prior To Admission None   Potential Assistance Needed Skilled Nursing Facility;Transportation   [address] Medications No   Patient expects to be discharged to: [facility] aware of discharge order. SNF list provided and FOC obtained. SNF referral sent to [facility] who has accepted referral and will initiate insurance authorization. Will continue to follow.     TESS Augustine   Care Manager, [facility]

## 2023-08-22 NOTE — PROGRESS NOTES
Occupational Therapy  Medical record reviewed. Aborted tx session as pt eating her breakfast.  Educated on importance of good nutrition for bone healing. Discussed her falls and there seems to be an inattention component as pt reports that she tends to trip when surface changes--threshold, etc.  Pt has been retired for approximately  the past year and states that she is less active than when working. Will follow up later.

## 2023-08-22 NOTE — PLAN OF CARE
Problem: Physical Therapy - Adult  Goal: By Discharge: Performs mobility at highest level of function for planned discharge setting. See evaluation for individualized goals. Description: FUNCTIONAL STATUS PRIOR TO ADMISSION: Patient was modified independent using a rolling walker and single point cane for functional mobility. She has a history of multiple falls. Reports increased difficulty managing at home and unable to use RW since fall 8/16/2023 and left wrist fracture. HOME SUPPORT PRIOR TO ADMISSION: The patient lived alone with local daughter to provide some assistance. Daughter works full time. Physical Therapy Goals  Initiated 8/20/2023  1. Patient will move from supine to sit and sit to supine, scoot up and down, and roll side to side in bed with minimal assistance within 7 day(s). 2.  Patient will perform sit to stand with minimal assistance within 7 day(s). 3.  Patient will transfer from bed to chair and chair to bed with minimal assistance using the least restrictive device within 7 day(s). 4.  Patient will ambulate with minimal assistance for 25 feet with the least restrictive device within 7 day(s). Outcome: Progressing   PHYSICAL THERAPY TREATMENT    Patient: Tricia Villatoro (40 y.o. female)  Date: 8/22/2023  Diagnosis: Frequent falls [R29.6]  Recurrent falls [R29.6]  Left wrist fracture, closed, initial encounter [Y19.866M] Recurrent falls      Precautions: Weight Bearing, Fall Risk, Contact Precautions (MRSA)       Left Upper Extremity Weight Bearing: Non Weight Bearing            ASSESSMENT:  Patient continues to benefit from skilled PT services and is progressing towards goals. Patient overall limited by pain, generalized weakness, gait instability and decreased activity tolerance. Currently needing modA to come to EOB with HOB elevated and use of rails. MaxA to scoot to the EOB. Worked on pull to stand with niles and Severiano Shah initially but improved to Gema.   Able to take a

## 2023-08-23 LAB
ANION GAP SERPL CALC-SCNC: 5 MMOL/L (ref 5–15)
BUN SERPL-MCNC: 18 MG/DL (ref 6–20)
BUN/CREAT SERPL: 29 (ref 12–20)
CALCIUM SERPL-MCNC: 8.1 MG/DL (ref 8.5–10.1)
CHLORIDE SERPL-SCNC: 111 MMOL/L (ref 97–108)
CO2 SERPL-SCNC: 24 MMOL/L (ref 21–32)
CREAT SERPL-MCNC: 0.62 MG/DL (ref 0.55–1.02)
ERYTHROCYTE [DISTWIDTH] IN BLOOD BY AUTOMATED COUNT: 14.6 % (ref 11.5–14.5)
GLUCOSE SERPL-MCNC: 93 MG/DL (ref 65–100)
HCT VFR BLD AUTO: 26.2 % (ref 35–47)
HGB BLD-MCNC: 8.2 G/DL (ref 11.5–16)
MCH RBC QN AUTO: 31.8 PG (ref 26–34)
MCHC RBC AUTO-ENTMCNC: 31.3 G/DL (ref 30–36.5)
MCV RBC AUTO: 101.6 FL (ref 80–99)
NRBC # BLD: 0 K/UL (ref 0–0.01)
NRBC BLD-RTO: 0 PER 100 WBC
PLATELET # BLD AUTO: 215 K/UL (ref 150–400)
PMV BLD AUTO: 10.9 FL (ref 8.9–12.9)
POTASSIUM SERPL-SCNC: 3.8 MMOL/L (ref 3.5–5.1)
RBC # BLD AUTO: 2.58 M/UL (ref 3.8–5.2)
SODIUM SERPL-SCNC: 140 MMOL/L (ref 136–145)
WBC # BLD AUTO: 7.6 K/UL (ref 3.6–11)

## 2023-08-23 PROCEDURE — 85027 COMPLETE CBC AUTOMATED: CPT

## 2023-08-23 PROCEDURE — 6370000000 HC RX 637 (ALT 250 FOR IP): Performed by: STUDENT IN AN ORGANIZED HEALTH CARE EDUCATION/TRAINING PROGRAM

## 2023-08-23 PROCEDURE — 80048 BASIC METABOLIC PNL TOTAL CA: CPT

## 2023-08-23 PROCEDURE — 36415 COLL VENOUS BLD VENIPUNCTURE: CPT

## 2023-08-23 PROCEDURE — 97116 GAIT TRAINING THERAPY: CPT

## 2023-08-23 PROCEDURE — 97530 THERAPEUTIC ACTIVITIES: CPT

## 2023-08-23 PROCEDURE — 2580000003 HC RX 258: Performed by: STUDENT IN AN ORGANIZED HEALTH CARE EDUCATION/TRAINING PROGRAM

## 2023-08-23 PROCEDURE — 1100000000 HC RM PRIVATE

## 2023-08-23 RX ADMIN — Medication: at 09:00

## 2023-08-23 RX ADMIN — SODIUM CHLORIDE, PRESERVATIVE FREE 10 ML: 5 INJECTION INTRAVENOUS at 09:00

## 2023-08-23 RX ADMIN — ACETAMINOPHEN 650 MG: 325 TABLET ORAL at 16:09

## 2023-08-23 RX ADMIN — ACETAMINOPHEN 650 MG: 325 TABLET ORAL at 23:47

## 2023-08-23 RX ADMIN — Medication: at 21:15

## 2023-08-23 RX ADMIN — SODIUM CHLORIDE, PRESERVATIVE FREE 10 ML: 5 INJECTION INTRAVENOUS at 21:16

## 2023-08-23 ASSESSMENT — PAIN SCALES - GENERAL: PAINLEVEL_OUTOF10: 0

## 2023-08-23 NOTE — PLAN OF CARE
Problem: Discharge Planning  Goal: Discharge to home or other facility with appropriate resources  Outcome: Progressing     Problem: Safety - Adult  Goal: Free from fall injury  Outcome: Progressing     Problem: Physical Therapy - Adult  Goal: By Discharge: Performs mobility at highest level of function for planned discharge setting. See evaluation for individualized goals. Description: FUNCTIONAL STATUS PRIOR TO ADMISSION: Patient was modified independent using a rolling walker and single point cane for functional mobility. She has a history of multiple falls. Reports increased difficulty managing at home and unable to use RW since fall 8/16/2023 and left wrist fracture. HOME SUPPORT PRIOR TO ADMISSION: The patient lived alone with local daughter to provide some assistance. Daughter works full time. Physical Therapy Goals  Initiated 8/20/2023  1. Patient will move from supine to sit and sit to supine, scoot up and down, and roll side to side in bed with minimal assistance within 7 day(s). 2.  Patient will perform sit to stand with minimal assistance within 7 day(s). 3.  Patient will transfer from bed to chair and chair to bed with minimal assistance using the least restrictive device within 7 day(s). 4.  Patient will ambulate with minimal assistance for 25 feet with the least restrictive device within 7 day(s).     8/23/2023 1029 by Christian Roberts PTA  Outcome: Progressing     Problem: Skin/Tissue Integrity  Goal: Absence of new skin breakdown  Description: 1. Monitor for areas of redness and/or skin breakdown  2. Assess vascular access sites hourly  3. Every 4-6 hours minimum:  Change oxygen saturation probe site  4. Every 4-6 hours:  If on nasal continuous positive airway pressure, respiratory therapy assess nares and determine need for appliance change or resting period.   Outcome: Progressing

## 2023-08-23 NOTE — PROGRESS NOTES
Pt reports her left arm/fingers feel cool to touch and numb. Nurse assessed and fingers felt warm but not as warm as the right side. Pt moving fingers. Pt given tylenol for mild pain in left arm. 20 minutes later she rang out again saying same thing. Nurse undressed splint with ace wrap and felt a strong radial pulse.   Nurse to update MD for further eval.

## 2023-08-23 NOTE — PROGRESS NOTES
Occupational Therapy  Medical record reviewed. Pt was seated in chair upon arrival; she declined OT at this time, despite encouragement, and stated that she was waiting for information re: discharge to rehab. Will continue to follow.

## 2023-08-23 NOTE — PLAN OF CARE
Problem: Physical Therapy - Adult  Goal: By Discharge: Performs mobility at highest level of function for planned discharge setting. See evaluation for individualized goals. Description: FUNCTIONAL STATUS PRIOR TO ADMISSION: Patient was modified independent using a rolling walker and single point cane for functional mobility. She has a history of multiple falls. Reports increased difficulty managing at home and unable to use RW since fall 8/16/2023 and left wrist fracture. HOME SUPPORT PRIOR TO ADMISSION: The patient lived alone with local daughter to provide some assistance. Daughter works full time. Physical Therapy Goals  Initiated 8/20/2023  1. Patient will move from supine to sit and sit to supine, scoot up and down, and roll side to side in bed with minimal assistance within 7 day(s). 2.  Patient will perform sit to stand with minimal assistance within 7 day(s). 3.  Patient will transfer from bed to chair and chair to bed with minimal assistance using the least restrictive device within 7 day(s). 4.  Patient will ambulate with minimal assistance for 25 feet with the least restrictive device within 7 day(s). Outcome: Progressing   PHYSICAL THERAPY TREATMENT    Patient: Tillman Klinefelter (76 y.o. female)  Date: 8/23/2023  Diagnosis: Frequent falls [R29.6]  Recurrent falls [R29.6]  Left wrist fracture, closed, initial encounter [F63.699A] Recurrent falls      Precautions: Weight Bearing, Fall Risk, Contact Precautions (MRSA)       Left Upper Extremity Weight Bearing: Non Weight Bearing          ASSESSMENT:  Patient continues to benefit from skilled PT services and is progressing towards goals. Pt continues to have impaired functional mobility secondary to her NWB LUE precautions, needing min-modA x1 throughout. Pt requires assistance for LE management with transitions to come sitting EOB, and noted with decreased LE strength R > L.  Pt has some impaired balance upon standing initially, needing

## 2023-08-24 PROCEDURE — 97535 SELF CARE MNGMENT TRAINING: CPT | Performed by: OCCUPATIONAL THERAPIST

## 2023-08-24 PROCEDURE — 1100000000 HC RM PRIVATE

## 2023-08-24 PROCEDURE — 2580000003 HC RX 258: Performed by: STUDENT IN AN ORGANIZED HEALTH CARE EDUCATION/TRAINING PROGRAM

## 2023-08-24 PROCEDURE — 97530 THERAPEUTIC ACTIVITIES: CPT

## 2023-08-24 RX ADMIN — Medication: at 10:41

## 2023-08-24 RX ADMIN — SODIUM CHLORIDE, PRESERVATIVE FREE 10 ML: 5 INJECTION INTRAVENOUS at 21:23

## 2023-08-24 ASSESSMENT — PAIN SCALES - GENERAL
PAINLEVEL_OUTOF10: 0

## 2023-08-24 NOTE — PLAN OF CARE
Problem: Physical Therapy - Adult  Goal: By Discharge: Performs mobility at highest level of function for planned discharge setting. See evaluation for individualized goals. Description: FUNCTIONAL STATUS PRIOR TO ADMISSION: Patient was modified independent using a rolling walker and single point cane for functional mobility. She has a history of multiple falls. Reports increased difficulty managing at home and unable to use RW since fall 8/16/2023 and left wrist fracture. HOME SUPPORT PRIOR TO ADMISSION: The patient lived alone with local daughter to provide some assistance. Daughter works full time. Physical Therapy Goals  Initiated 8/20/2023  1. Patient will move from supine to sit and sit to supine, scoot up and down, and roll side to side in bed with minimal assistance within 7 day(s). 2.  Patient will perform sit to stand with minimal assistance within 7 day(s). 3.  Patient will transfer from bed to chair and chair to bed with minimal assistance using the least restrictive device within 7 day(s). 4.  Patient will ambulate with minimal assistance for 25 feet with the least restrictive device within 7 day(s). Outcome: Progressing   PHYSICAL THERAPY TREATMENT    Patient: Berry Guzmán (71 y.o. female)  Date: 8/24/2023  Diagnosis: Frequent falls [R29.6]  Recurrent falls [R29.6]  Left wrist fracture, closed, initial encounter [Y60.017E] Recurrent falls      Precautions: Weight Bearing, Fall Risk, Contact Precautions (MRSA)       Left Upper Extremity Weight Bearing: Non Weight Bearing            ASSESSMENT:  Patient continues to benefit from skilled PT services and is slowly progressing towards goals. Pt focusing in transfer training with use of quad cane this session. She is mod to max A of 1 to come to the edge of the bed and scoot. She comes to stand with mod A of 1 and CGA of second for safety.  She then turned with the quad cane with mod A of 1 to aid in support of pt and mod A of second

## 2023-08-24 NOTE — PLAN OF CARE
Problem: Occupational Therapy - Adult  Goal: By Discharge: Performs self-care activities at highest level of function for planned discharge setting. See evaluation for individualized goals. Description: FUNCTIONAL STATUS PRIOR TO ADMISSION:  Patient was ambulatory using cane for transfers and RW for ambulation. Patient typically independent in all ADLs and IADLs. Note recent ED visit after fall with LUE splinted, second fall sustained just prior to this admission. HOME SUPPORT: Patient lived alone; supportive daughter local.    Occupational Therapy Goals:  Initiated 8/20/2023  1. Patient will perform standing grooming with Minimal Assist within 7 day(s). 2.  Patient will perform upper body dressing and bathing with Minimal Assist within 7 day(s). 3.  Patient will perform lower body dressing and bathing with Minimal Assist within 7 day(s). 4.  Patient will perform toilet transfers to [facility] with Minimal Assist using DME prn within 7 day(s). 5.  Patient will perform all aspects of toileting with Minimal Assist using DME prn within 7 day(s). 6.  Patient will utilize energy conservation and fall prevention techniques during functional activities with verbal cues within 7 day(s). Outcome: Progressing   OCCUPATIONAL THERAPY TREATMENT  Patient: Jessie Miranda (89 y.o. female)  Date: 8/24/2023  Primary Diagnosis: Frequent falls [R29.6]  Recurrent falls [R29.6]  Left wrist fracture, closed, initial encounter [S62.102A]       Precautions: Weight Bearing, Fall Risk, Contact Precautions (MRSA)       Left Upper Extremity Weight Bearing: Non Weight Bearing        Chart, occupational therapy assessment, plan of care, and goals were reviewed. ASSESSMENT  Patient continues to benefit from skilled OT services and is slowly progressing towards goals. Pt was able to perform bed mobility with less assistance this date.   She was able to transfer to [facility] with one to two assist, BSC stabilized and pt using quad cane with support; Fair (assist X2 for standing balance during pericare-pt set up)  Standing - Dynamic: Constant support;Fair;Poor (assist X2)      ADL Intervention:         Feeding: Supervision         Grooming: Stand by assistance;Supervision        UE Bathing: Minimal assistance; Moderate assistance                          LE Dressing Skilled Clinical Factors: mod A to remove L sock       Toileting Skilled Clinical Factors: assist X2 mod A to perform pericare in standing position with set up of wipes  after BSC transfer                       Pain Rating:  Did not rate pain   Pain Intervention(s): Activity Tolerance:   Fair , Poor, requires frequent rest breaks, and pt is fearful of falling  Please refer to the flowsheet for vital signs taken during this treatment.     After treatment:   Patient left in no apparent distress sitting up in chair, Call bell within reach, and Bed/ chair alarm activated    COMMUNICATION/EDUCATION:   The patient's plan of care was discussed with: physical therapist, registered nurse, and     Patient Education  Education Given To: Patient  Education Provided: Transfer Training  Education Method: Verbal  Barriers to Learning: None  Education Outcome: Continued education needed    Thank you for this referral.  Rin Hines OTR/L  Minutes: 24

## 2023-08-24 NOTE — PROGRESS NOTES
Physician Progress Note      Loyd Garcia  CSN #:                  [id number]  :                       1950  ADMIT DATE:       2023 11:02 AM  DISCH DATE:  RESPONDING  PROVIDER #:        Austin Kam MD          QUERY TEXT:    Pt admitted with Hip and knee pain S/p Recurrent Falls. Pt noted to have \"   diffuse osteopenia \" per XR Pelvis and B/L LE edema. If possible, please   document in progress notes and discharge summary if you are evaluating and/or   treating any of the following:  [  The medical record reflects the following:  Risk Factors: Hx: Lymphedema; HTN;  A-fib on Xarelto  Clinical Indicators: XR Femur Right: Study limited due to diffuse osteopenia   and patient's increased body habitus; No appreciable acute fracture nor   dislocation. Lumbar spine hardware; Vascular Duplex LE Right: NO DVT     AP: Knee pain  Hip pain  Recurrent falls  Recent intra articular fracture of the distal end of the left radius  All x-rays were unremarkable  PT OT eval  Patient alert and oriented although she is on Xarelto will get CT head  ? Hx of lymphedema  Lower legs edema  Lower extremity Doppler where negative for DVT  ?   IM PN: Knee pain  Hip pain  Recurrent falls  Recent?intra articular fracture of the distal end of the left radius  All x-rays were unremarkable  PT OT eval  CT head unremarkable  Ortho consulted their input appreciated continue with NWB left wrist and hand   and to follow with Dr. Savita Schafer or Dr. Evangelina Tabares in 1 to 2 weeks  ? Hx of?lymphedema  Lower legs? edema  Lower extremity Doppler where negative for DVT    Left leg wound  We will get wound care  No signs of infection so far  We will send blood cultures    Treatment: XR Femur Right; XR Pelvis; Vascular duplex LE Right; Ortho consult;   PT/OT eval    Thank you,    Savannah Cortes  CDI  Options provided:  -- Recurrent falls due to osteopenia  -- Recurrent falls due to lower extremity edema  -- Recurrent falls

## 2023-08-24 NOTE — CARE COORDINATION
Transition of Care Plan:    RUR: 22%  Prior Level of Functioning: independent, lives alone   Disposition: SNF- [address] [place] [address]   If SNF or IPR: Date FOC offered: 8/22/23  Date FOC received: 8/22/23  Accepting facility: [address] [place] [address]   Date authorization started with reference number: 8/23/23   Date authorization received and expires: 8/24/23  Follow up appointments: PCP  DME needed: N/A  Transportation at discharge: medical transport   IM/IMM Medicare/ letter given: given  Is patient a [place] and connected with [facility]? No   If yes, was [employer] transfer form completed and [facility] notified? Caregiver Contact: Catie Echols (daughter)  Discharge Caregiver contacted prior to discharge? yes  Care Conference needed? no  Barriers to discharge:  bed availability     Chart reviewed. CM aware of discharge order. [address] [place] [address] accepted pt for placement, insurance authorization has been approved. [facility] does not have an available bed for pt to admit today. Will contact facility first thing in the AM to check bed availability. Will continue to follow.     TESS Dave   Care Manager, [facility]

## 2023-08-25 VITALS
TEMPERATURE: 97.7 F | BODY MASS INDEX: 33.3 KG/M2 | DIASTOLIC BLOOD PRESSURE: 50 MMHG | HEART RATE: 64 BPM | SYSTOLIC BLOOD PRESSURE: 125 MMHG | HEIGHT: 63 IN | OXYGEN SATURATION: 100 % | RESPIRATION RATE: 18 BRPM

## 2023-08-25 LAB
BACTERIA SPEC CULT: NORMAL
BACTERIA SPEC CULT: NORMAL
SERVICE CMNT-IMP: NORMAL
SERVICE CMNT-IMP: NORMAL

## 2023-08-25 PROCEDURE — 97116 GAIT TRAINING THERAPY: CPT

## 2023-08-25 PROCEDURE — 6370000000 HC RX 637 (ALT 250 FOR IP): Performed by: STUDENT IN AN ORGANIZED HEALTH CARE EDUCATION/TRAINING PROGRAM

## 2023-08-25 PROCEDURE — 2580000003 HC RX 258: Performed by: STUDENT IN AN ORGANIZED HEALTH CARE EDUCATION/TRAINING PROGRAM

## 2023-08-25 PROCEDURE — 97530 THERAPEUTIC ACTIVITIES: CPT

## 2023-08-25 RX ADMIN — Medication: at 10:00

## 2023-08-25 RX ADMIN — ACETAMINOPHEN 650 MG: 325 TABLET ORAL at 00:10

## 2023-08-25 RX ADMIN — SODIUM CHLORIDE, PRESERVATIVE FREE 10 ML: 5 INJECTION INTRAVENOUS at 10:00

## 2023-08-25 ASSESSMENT — PAIN SCALES - GENERAL: PAINLEVEL_OUTOF10: 0

## 2023-08-25 NOTE — PLAN OF CARE
Problem: Discharge Planning  Goal: Discharge to home or other facility with appropriate resources  Outcome: Progressing     Problem: Safety - Adult  Goal: Free from fall injury  Outcome: Progressing     Problem: Physical Therapy - Adult  Goal: By Discharge: Performs mobility at highest level of function for planned discharge setting. See evaluation for individualized goals. Description: FUNCTIONAL STATUS PRIOR TO ADMISSION: Patient was modified independent using a rolling walker and single point cane for functional mobility. She has a history of multiple falls. Reports increased difficulty managing at home and unable to use RW since fall 8/16/2023 and left wrist fracture. HOME SUPPORT PRIOR TO ADMISSION: The patient lived alone with local daughter to provide some assistance. Daughter works full time. Physical Therapy Goals  Initiated 8/20/2023  1. Patient will move from supine to sit and sit to supine, scoot up and down, and roll side to side in bed with minimal assistance within 7 day(s). 2.  Patient will perform sit to stand with minimal assistance within 7 day(s). 3.  Patient will transfer from bed to chair and chair to bed with minimal assistance using the least restrictive device within 7 day(s). 4.  Patient will ambulate with minimal assistance for 25 feet with the least restrictive device within 7 day(s).     8/25/2023 1119 by Gurjit Estevez PT  Outcome: Progressing     Problem: Skin/Tissue Integrity  Goal: Absence of new skin breakdown  Description: 1. Monitor for areas of redness and/or skin breakdown  2. Assess vascular access sites hourly  3. Every 4-6 hours minimum:  Change oxygen saturation probe site  4. Every 4-6 hours:  If on nasal continuous positive airway pressure, respiratory therapy assess nares and determine need for appliance change or resting period.   Outcome: Progressing     Problem: Pain  Goal: Verbalizes/displays adequate comfort level or baseline comfort

## 2023-08-25 NOTE — DISCHARGE SUMMARY
Discharge Summary    Name: Susan Arana  [id number]  YOB: 1950 (Age: 71 y.o.)   Date of Admission: 8/19/2023  Date of Discharge: 8/25/2023  Attending Physician: Natalia Mendoza MD    Discharge Diagnosis:   Knee pain  Hip pain  Recurrent falls  Recent intra articular fracture of the distal end of the left radius  Hx of lymphedema  Lower legs edema  A-fib  Left leg wound  History of hypertension        Consultations:  IP CONSULT TO CASE MANAGEMENT  IP CONSULT TO HOSPITALIST  IP WOUND CARE NURSE CONSULT TO EVAL  IP CONSULT TO ORTHOPEDIC SURGERY      Brief Admission History/Reason for Admission Per Stella Ortiz MD: Vidya Lagunas is a 71 y.o.  female with PMHx significant for A-fib on Xarelto she had recurrent falls recently recent 1 and ended with fracture of her left wrist which is casted, patient stated yesterday again fell down in the kitchen once her legs are just give away, patient denied any numbness in her legs, or weakness, patient also denied any dizziness or lightheadedness before the fall, patient denied any other symptoms of headache change in vision, just her legs are so swollen and this issue has been for a long time and worsens over the past few days and that worsening her mobility. We were asked to admit for work up and evaluation of the above problems. Brief Hospital Course by Main Problems:   Knee pain  Hip pain  Recurrent falls  Recent intra articular fracture of the distal end of the left radius  All x-rays were unremarkable  PT OT eval  CT head unremarkable  Ortho consulted their input appreciated continue with NWB left wrist and hand and to follow with Dr. Cordell Brooks or Dr. Shayy Elizalde in 1 to 2 weeks  Stable  8/23: Patient stable for discharge. Care management on board for placement. 8/24: Auth pending. Likely discharge to [facility]. 8/25: Patient discharged to [facility] today.      Hx of lymphedema  Lower legs edema  Lower extremity

## 2023-08-25 NOTE — PLAN OF CARE
Problem: Physical Therapy - Adult  Goal: By Discharge: Performs mobility at highest level of function for planned discharge setting. See evaluation for individualized goals. Description: FUNCTIONAL STATUS PRIOR TO ADMISSION: Patient was modified independent using a rolling walker and single point cane for functional mobility. She has a history of multiple falls. Reports increased difficulty managing at home and unable to use RW since fall 8/16/2023 and left wrist fracture. HOME SUPPORT PRIOR TO ADMISSION: The patient lived alone with local daughter to provide some assistance. Daughter works full time. Physical Therapy Goals  Initiated 8/20/2023  1. Patient will move from supine to sit and sit to supine, scoot up and down, and roll side to side in bed with minimal assistance within 7 day(s). 2.  Patient will perform sit to stand with minimal assistance within 7 day(s). 3.  Patient will transfer from bed to chair and chair to bed with minimal assistance using the least restrictive device within 7 day(s).   4.  Patient will ambulate with minimal assistance for 25 feet with the least restrictive device within 7 day(s).     8/25/2023 1119 by Yan Mendoza PT  Outcome: Progressing

## 2023-08-25 NOTE — PLAN OF CARE
Problem: Physical Therapy - Adult  Goal: By Discharge: Performs mobility at highest level of function for planned discharge setting. See evaluation for individualized goals. Description: FUNCTIONAL STATUS PRIOR TO ADMISSION: Patient was modified independent using a rolling walker and single point cane for functional mobility. She has a history of multiple falls. Reports increased difficulty managing at home and unable to use RW since fall 8/16/2023 and left wrist fracture. HOME SUPPORT PRIOR TO ADMISSION: The patient lived alone with local daughter to provide some assistance. Daughter works full time. Physical Therapy Goals  Initiated 8/20/2023  1. Patient will move from supine to sit and sit to supine, scoot up and down, and roll side to side in bed with minimal assistance within 7 day(s). 2.  Patient will perform sit to stand with minimal assistance within 7 day(s). 3.  Patient will transfer from bed to chair and chair to bed with minimal assistance using the least restrictive device within 7 day(s). 4.  Patient will ambulate with minimal assistance for 25 feet with the least restrictive device within 7 day(s). Outcome: Progressing   PHYSICAL THERAPY TREATMENT    Patient: John Paul Lozada (53 y.o. female)  Date: 8/25/2023  Diagnosis: Frequent falls [R29.6]  Recurrent falls [R29.6]  Left wrist fracture, closed, initial encounter [E54.160G] Recurrent falls      Precautions: Weight Bearing, Fall Risk, Contact Precautions (MRSA)       Left Upper Extremity Weight Bearing: Non Weight Bearing            ASSESSMENT:  Patient continues to benefit from skilled PT services and is progressing towards goals. Pt showing good improvement this session. She was overall min A to complete scoot to edge of bed and min A to stand and take a few steps forward and then turn to sit in bedside chair with the quad cane. Her steps are shortened and she needs extra time.  She showed improved balance in standing and was QC)  Balance:  Balance  Sitting: Intact  Standing: Impaired  Standing - Static: Fair;Constant support (with QC)  Standing - Dynamic: Constant support; Fair (with quad cane)   Ambulation/Gait Training:     Gait  Overall Level of Assistance: Minimum assistance (short forward then turning steps to chair)  Interventions: Safety awareness training;Verbal cues  Base of Support: Widened  Speed/Ira: Shuffled;Pace decreased (< 100 feet/min)  Step Length: Left shortened;Right shortened  Gait Abnormalities: Decreased step clearance;Shuffling gait;Trunk sway increased  Distance (ft): 6 Feet  Assistive Device: Cane, quad;Gait belt      Pain Rating:  Did not interfere with session, pt tolerated mobility well. Activity Tolerance:   Fair     After treatment:   Patient left in no apparent distress sitting up in chair, Call bell within reach, and Bed/ chair alarm activated      COMMUNICATION/EDUCATION:   The patient's plan of care was discussed with: registered nurse    Patient Education  Education Given To: Patient  Education Provided: Role of Therapy;Transfer Training; Fall Prevention Strategies  Education Provided Comments: bed mobility  Education Method: Demonstration;Verbal;Teach Back  Barriers to Learning: None  Education Outcome: Continued education needed;Verbalized understanding      Harry Klein, PT  Minutes: 66

## 2023-08-25 NOTE — PROGRESS NOTES
Verbal and telephonic report given to Bekah Graham LPN (receiving nurse) by Malachi Perdomo RN (transferring nurse). Report included the following information Nurse Handoff Report, Adult Overview, MAR, and Recent Results. /62, HR 65, RR 18, SPO2 100 on room air,  temp 97.9. Patient was handed over to Hospital to home crew she is fully awake and oriented. Patient had no belongings. Family collected in advance.

## 2023-08-25 NOTE — CARE COORDINATION
Transition of Care Plan:     RUR: 22%  Prior Level of Functioning: independent, lives alone   Disposition: SNF- [facility]   If SNF or IPR: Date FOC offered: 8/22/23  Date FOC received: 8/22/23  Accepting facility: [facility]   Date authorization started with reference number: 8/23/23   Date authorization received and expires: 8/24/23  Follow up appointments: PCP  DME needed: N/A  [facility] discharge[facility]  IM/IMM Medicare/ letter given: given  Is patient a [place] and connected with [facility]? No              If yes, was [facility] transfer form completed and VA notified? Caregiver Contact: Bonnie Jones (daughter)  Discharge Caregiver contacted prior to discharge? yes  Care Conference needed? no  Barriers to discharge: none        08/25/23 1628   Services At/After Discharge   Transition of Care Consult (CM Consult) Discharge [place] [facility] [address] (SNF); Transport   [employer] Information Provided? No   Mode of Transport at Discharge [facility] Transport Time of Discharge 1700   Confirm Follow Up Transport Family   Condition of Participation: Discharge Planning   The Plan for Transition of Care is related to the following treatment goals: SNF placement   The Patient and/or Patient Representative was provided with a Choice of Provider? Patient   The Patient and/Or Patient Representative agree with the Discharge Plan? Yes   Freedom of Choice list was provided with basic dialogue that supports the patient's individualized plan of care/goals, treatment preferences, and shares the quality data associated with the providers? Yes       Transition of Care Plan to SNF/Rehab    Communication to Patient/Family:  Met with patient and family and they are agreeable to the transition plan.  The Plan for Transition of Care is related to the following treatment goals: SNF/rehab placement     The Patient and/or patient

## 2023-09-18 ENCOUNTER — TELEPHONE (OUTPATIENT)
Age: 73
End: 2023-09-18

## 2023-09-18 NOTE — TELEPHONE ENCOUNTER
Any time pt stands 2-3 minutes pt's b/p is going down 90/50  symptomatic with nausea and dizzy. Pt is taking medications for htn and low blood pressure. Please call to go over medications.

## 2023-09-18 NOTE — TELEPHONE ENCOUNTER
She might be dehydrated. Hold bumex for a couple of days, and drink plenty of fluids. ER if she feels worse.    BJF

## 2023-09-18 NOTE — TELEPHONE ENCOUNTER
Per therapists    BP sitting 120/59 prior to therapy  Stands or walks for 2-3 minutes - patient complains of nausea and dizziness - with BP of 90/50   BP after sitting for 10 minutes  - 116/52

## 2023-09-19 NOTE — TELEPHONE ENCOUNTER
Daughter Em Otero - HIPAA  Two pt identifiers confirmed.     Updated with PCP recommendations  States understanding       Therapist Fatuma Saravia updated as well

## 2023-09-20 ENCOUNTER — PATIENT MESSAGE (OUTPATIENT)
Age: 73
End: 2023-09-20

## 2023-09-20 NOTE — TELEPHONE ENCOUNTER
PCP: Geronimo Farris MD    Last appt:   8/3/2023    No future appointments.     Requested Prescriptions     Pending Prescriptions Disp Refills    midodrine (PROAMATINE) 5 MG tablet 90 tablet 3     Sig: Take 1 tablet by mouth 2 times daily (with meals)    PARoxetine (PAXIL) 10 MG tablet 30 tablet 3     Sig: Take 1 tablet by mouth daily    potassium chloride (KLOR-CON M) 20 MEQ extended release tablet 30 tablet 0     Sig: Take 1 tablet by mouth daily    atorvastatin (LIPITOR) 40 MG tablet 90 tablet 1     Sig: Take 1 tablet by mouth daily

## 2023-09-20 NOTE — TELEPHONE ENCOUNTER
----- Message from Ayleen Guevara sent at 9/20/2023  6:48 AM EDT -----  Regarding: in need of refills that are \"not available for refill on mychart\"  Contact: [phone]  Good morning! Lisa's daughter, Anthony Burrows, here--- Lisa is need of a few refills, but it's not allowing  me to request the refills via my chart. Hoping you can help. She's needing her:    Atorvastatin 40mg  Potassium chloride jorge er 20 tbcr  Midodrine hcl 10mg  Paroxetine hcl 10mg tabs     [address]  [phone]    Let me know if you need anything else!

## 2023-09-21 RX ORDER — ATORVASTATIN CALCIUM 40 MG/1
40 TABLET, FILM COATED ORAL DAILY
Qty: 90 TABLET | Refills: 3 | Status: SHIPPED | OUTPATIENT
Start: 2023-09-21

## 2023-09-21 RX ORDER — PAROXETINE 10 MG/1
10 TABLET, FILM COATED ORAL DAILY
Qty: 90 TABLET | Refills: 3 | Status: SHIPPED | OUTPATIENT
Start: 2023-09-21

## 2023-09-21 RX ORDER — POTASSIUM CHLORIDE 20 MEQ/1
20 TABLET, EXTENDED RELEASE ORAL DAILY
Qty: 90 TABLET | Refills: 3 | Status: SHIPPED | OUTPATIENT
Start: 2023-09-21

## 2023-09-21 RX ORDER — MIDODRINE HYDROCHLORIDE 5 MG/1
5 TABLET ORAL 2 TIMES DAILY WITH MEALS
Qty: 180 TABLET | Refills: 3 | Status: SHIPPED | OUTPATIENT
Start: 2023-09-21

## 2023-09-22 NOTE — TELEPHONE ENCOUNTER
From: Radha Handley  To: Dr. Devonte Wilde  Sent: 9/20/2023 6:48 AM EDT  Subject: in need of refills that are \"not available for refill on mychart\"    Good morning! Lisa's daughter, Valarie Patel, here--- Lisa is need of a few refills, but it's not allowing me to request the refills via my chart. Hoping you can help. She's needing her:    Atorvastatin 40mg  Potassium chloride jorge er 20 tbcr  Midodrine hcl 10mg  Paroxetine hcl 10mg tabs     [address]  [phone]    Let me know if you need anything else!

## 2023-09-22 NOTE — TELEPHONE ENCOUNTER
Future Appointments:  No future appointments.      Last Appointment With Me:  8/3/2023     Requested Prescriptions     Pending Prescriptions Disp Refills    amiodarone (PACERONE) 400 MG tablet 90 tablet 3     Sig: Take 1 tablet by mouth daily

## 2023-09-23 RX ORDER — AMIODARONE HYDROCHLORIDE 400 MG/1
400 TABLET ORAL DAILY
Qty: 90 TABLET | Refills: 3 | Status: SHIPPED | OUTPATIENT
Start: 2023-09-23

## 2023-10-25 ENCOUNTER — TELEPHONE (OUTPATIENT)
Age: 73
End: 2023-10-25

## 2023-10-25 NOTE — TELEPHONE ENCOUNTER
I spoke to Sharon she stated the patient's BP today was 180/60, her weight increase 10lbs in 1 week and she has  gained 21lb is 1 mos. She is not taking the bumex or the lisinopril-HCTZ; however, she has been taking midodrine. She let the patient's daughter know, she took the midodrine  out of her pill box until she hears back from . Lyndsey Garvin is aware the patient will need a follow up appt with .

## 2023-10-25 NOTE — TELEPHONE ENCOUNTER
Flora//[facility] states she needs a call back to discuss Plan of Care for patient with Elevated Blood Pressure, & also 2 falls within past week & 10 lb weight gain in past week. Please call to discuss & advise.  Thank you

## 2023-10-26 NOTE — TELEPHONE ENCOUNTER
Spoke w/ pt's daughter Javna Abarca, scheduled appt for 11/2. Pt's daughter would like to know if pt should start back up with the Lisinopril and the Bumex before they are seen or wait till pt see the provider on 11/2. Please advise. Thank you!

## 2023-10-26 NOTE — TELEPHONE ENCOUNTER
Two pt identifiers confirmed. I advised Ilda Haro RN per , she may have volume overloaded; add back bumex and keep track of weight. Ilda Haro is going to call the patient's daughter.

## 2023-11-02 ENCOUNTER — OFFICE VISIT (OUTPATIENT)
Age: 73
End: 2023-11-02
Payer: MEDICARE

## 2023-11-02 VITALS
WEIGHT: 164.4 LBS | HEART RATE: 61 BPM | SYSTOLIC BLOOD PRESSURE: 148 MMHG | OXYGEN SATURATION: 99 % | HEIGHT: 63 IN | RESPIRATION RATE: 18 BRPM | BODY MASS INDEX: 29.13 KG/M2 | TEMPERATURE: 97.3 F | DIASTOLIC BLOOD PRESSURE: 85 MMHG

## 2023-11-02 DIAGNOSIS — N17.9 AKI (ACUTE KIDNEY INJURY) (HCC): ICD-10-CM

## 2023-11-02 DIAGNOSIS — R73.9 HYPERGLYCEMIA, UNSPECIFIED: ICD-10-CM

## 2023-11-02 DIAGNOSIS — I10 PRIMARY HYPERTENSION: ICD-10-CM

## 2023-11-02 DIAGNOSIS — Z00.00 ENCOUNTER FOR SUBSEQUENT ANNUAL WELLNESS VISIT (AWV) IN MEDICARE PATIENT: Primary | ICD-10-CM

## 2023-11-02 DIAGNOSIS — I48.91 ATRIAL FIBRILLATION, UNSPECIFIED TYPE (HCC): ICD-10-CM

## 2023-11-02 DIAGNOSIS — R60.0 BILATERAL LOWER EXTREMITY EDEMA: ICD-10-CM

## 2023-11-02 PROCEDURE — 3077F SYST BP >= 140 MM HG: CPT | Performed by: INTERNAL MEDICINE

## 2023-11-02 PROCEDURE — G0439 PPPS, SUBSEQ VISIT: HCPCS | Performed by: INTERNAL MEDICINE

## 2023-11-02 PROCEDURE — 3079F DIAST BP 80-89 MM HG: CPT | Performed by: INTERNAL MEDICINE

## 2023-11-02 PROCEDURE — 1123F ACP DISCUSS/DSCN MKR DOCD: CPT | Performed by: INTERNAL MEDICINE

## 2023-11-02 PROCEDURE — 99214 OFFICE O/P EST MOD 30 MIN: CPT | Performed by: INTERNAL MEDICINE

## 2023-11-02 RX ORDER — LISINOPRIL 10 MG/1
10 TABLET ORAL DAILY
Qty: 90 TABLET | Refills: 1 | Status: SHIPPED | OUTPATIENT
Start: 2023-11-02

## 2023-11-02 RX ORDER — DONEPEZIL HYDROCHLORIDE 10 MG/1
10 TABLET, FILM COATED ORAL NIGHTLY
Qty: 90 TABLET | Refills: 1 | Status: SHIPPED | OUTPATIENT
Start: 2023-11-02

## 2023-11-02 SDOH — ECONOMIC STABILITY: FOOD INSECURITY: WITHIN THE PAST 12 MONTHS, THE FOOD YOU BOUGHT JUST DIDN'T LAST AND YOU DIDN'T HAVE MONEY TO GET MORE.: NEVER TRUE

## 2023-11-02 SDOH — ECONOMIC STABILITY: INCOME INSECURITY: HOW HARD IS IT FOR YOU TO PAY FOR THE VERY BASICS LIKE FOOD, HOUSING, MEDICAL CARE, AND HEATING?: NOT HARD AT ALL

## 2023-11-02 SDOH — ECONOMIC STABILITY: FOOD INSECURITY: WITHIN THE PAST 12 MONTHS, YOU WORRIED THAT YOUR FOOD WOULD RUN OUT BEFORE YOU GOT MONEY TO BUY MORE.: NEVER TRUE

## 2023-11-02 ASSESSMENT — ANXIETY QUESTIONNAIRES
7. FEELING AFRAID AS IF SOMETHING AWFUL MIGHT HAPPEN: 0
IF YOU CHECKED OFF ANY PROBLEMS ON THIS QUESTIONNAIRE, HOW DIFFICULT HAVE THESE PROBLEMS MADE IT FOR YOU TO DO YOUR WORK, TAKE CARE OF THINGS AT HOME, OR GET ALONG WITH OTHER PEOPLE: NOT DIFFICULT AT ALL
GAD7 TOTAL SCORE: 0
6. BECOMING EASILY ANNOYED OR IRRITABLE: 0
3. WORRYING TOO MUCH ABOUT DIFFERENT THINGS: 0
2. NOT BEING ABLE TO STOP OR CONTROL WORRYING: 0
5. BEING SO RESTLESS THAT IT IS HARD TO SIT STILL: 0
4. TROUBLE RELAXING: 0
1. FEELING NERVOUS, ANXIOUS, OR ON EDGE: 0

## 2023-11-02 ASSESSMENT — PATIENT HEALTH QUESTIONNAIRE - PHQ9
2. FEELING DOWN, DEPRESSED OR HOPELESS: 0
SUM OF ALL RESPONSES TO PHQ QUESTIONS 1-9: 0
1. LITTLE INTEREST OR PLEASURE IN DOING THINGS: 0
SUM OF ALL RESPONSES TO PHQ QUESTIONS 1-9: 0
SUM OF ALL RESPONSES TO PHQ9 QUESTIONS 1 & 2: 0
SUM OF ALL RESPONSES TO PHQ QUESTIONS 1-9: 0
SUM OF ALL RESPONSES TO PHQ QUESTIONS 1-9: 0

## 2023-11-02 NOTE — PATIENT INSTRUCTIONS
Medicare Wellness Visit  The best way to live healthy is to have a lifestyle where you eat a well-balanced diet, exercise regularly, limit alcohol use,  and quit all forms of tobacco/nicotine, if applicable. Regular preventive services are another way to keep healthy. Preventive services (vaccines, screening tests, monitoring &  exams) can help personalize your care plan, which helps you manage your own care. Screening tests can find health  problems at the earliest stages, when they are easiest to treat. [facility] follows the current, evidence-based guidelines published by the [place] Larry Navarro (USPSTF) when recommending preventive services for our patients. Because we follow  these guidelines, sometimes recommendations change over time as research supports it. (For example, a prostate  screening blood test is no longer routinely recommended for men with no symptoms). Of course, you and your doctor  may decide to screen more often for some diseases, based on your risk and co-morbidities (chronic disease you are  already diagnosed with). Preventive services for you include:  - Medicare offers their members a free annual wellness visit, which is time for you and your primary care provider to  discuss and plan for your preventive service needs. Take advantage of this benefit every year!  -All adults over age 72 should receive the recommended pneumonia vaccines. Current USPSTF guidelines recommend a  series of two vaccines for the best pneumonia protection.  -All adults should have a flu vaccine yearly and tetanus vaccine every 10 years.  -All adults age 48 and older should receive the shingles vaccines (series of two vaccines).   -All adults age 44-73 who are overweight should have a diabetes screening test once every three years.  -Other screening tests & preventive services for persons with diabetes include: an eye exam to screen for diabetic  retinopathy, a

## 2023-12-04 ENCOUNTER — HOSPITAL ENCOUNTER (INPATIENT)
Facility: HOSPITAL | Age: 73
LOS: 19 days | DRG: 871 | End: 2023-12-23
Attending: STUDENT IN AN ORGANIZED HEALTH CARE EDUCATION/TRAINING PROGRAM | Admitting: INTERNAL MEDICINE
Payer: MEDICARE

## 2023-12-04 ENCOUNTER — APPOINTMENT (OUTPATIENT)
Facility: HOSPITAL | Age: 73
DRG: 871 | End: 2023-12-04
Payer: MEDICARE

## 2023-12-04 DIAGNOSIS — R74.01 TRANSAMINITIS: ICD-10-CM

## 2023-12-04 DIAGNOSIS — N30.00 ACUTE CYSTITIS WITHOUT HEMATURIA: ICD-10-CM

## 2023-12-04 DIAGNOSIS — I50.9 CONGESTIVE HEART FAILURE, UNSPECIFIED HF CHRONICITY, UNSPECIFIED HEART FAILURE TYPE (HCC): ICD-10-CM

## 2023-12-04 DIAGNOSIS — R65.20 SEVERE SEPSIS (HCC): Primary | ICD-10-CM

## 2023-12-04 DIAGNOSIS — N17.9 AKI (ACUTE KIDNEY INJURY) (HCC): ICD-10-CM

## 2023-12-04 DIAGNOSIS — A41.9 SEVERE SEPSIS (HCC): Primary | ICD-10-CM

## 2023-12-04 DIAGNOSIS — R11.0 NAUSEA: ICD-10-CM

## 2023-12-04 LAB
ALBUMIN SERPL-MCNC: 3.1 G/DL (ref 3.5–5)
ALBUMIN/GLOB SERPL: 0.9 (ref 1.1–2.2)
ALP SERPL-CCNC: 175 U/L (ref 45–117)
ALT SERPL-CCNC: 1399 U/L (ref 12–78)
ANION GAP BLD CALC-SCNC: 10 (ref 10–20)
ANION GAP BLD CALC-SCNC: 11 (ref 10–20)
ANION GAP SERPL CALC-SCNC: 15 MMOL/L (ref 5–15)
APPEARANCE UR: ABNORMAL
AST SERPL-CCNC: >2000 U/L (ref 15–37)
BACTERIA URNS QL MICRO: ABNORMAL /HPF
BASE DEFICIT BLD-SCNC: 5.9 MMOL/L
BASE DEFICIT BLD-SCNC: 6.6 MMOL/L
BASOPHILS # BLD: 0 K/UL (ref 0–0.1)
BASOPHILS NFR BLD: 0 % (ref 0–1)
BILIRUB SERPL-MCNC: 2.1 MG/DL (ref 0.2–1)
BILIRUB UR QL CFM: NEGATIVE
BUN SERPL-MCNC: 56 MG/DL (ref 6–20)
BUN/CREAT SERPL: 22 (ref 12–20)
CA-I BLD-MCNC: 1.01 MMOL/L (ref 1.12–1.32)
CA-I BLD-MCNC: 1.03 MMOL/L (ref 1.12–1.32)
CALCIUM SERPL-MCNC: 8.4 MG/DL (ref 8.5–10.1)
CHLORIDE BLD-SCNC: 102 MMOL/L (ref 100–108)
CHLORIDE BLD-SCNC: 104 MMOL/L (ref 100–108)
CHLORIDE SERPL-SCNC: 99 MMOL/L (ref 97–108)
CK SERPL-CCNC: 618 U/L (ref 26–192)
CO2 BLD-SCNC: 19 MMOL/L (ref 19–24)
CO2 BLD-SCNC: 20 MMOL/L (ref 19–24)
CO2 SERPL-SCNC: 19 MMOL/L (ref 21–32)
COLOR UR: ABNORMAL
CREAT SERPL-MCNC: 2.54 MG/DL (ref 0.55–1.02)
CREAT UR-MCNC: 1.9 MG/DL (ref 0.6–1.3)
CREAT UR-MCNC: 2.5 MG/DL (ref 0.6–1.3)
DIFFERENTIAL METHOD BLD: ABNORMAL
DIGOXIN SERPL-MCNC: <0.2 NG/ML (ref 0.9–2)
EOSINOPHIL # BLD: 0 K/UL (ref 0–0.4)
EOSINOPHIL NFR BLD: 0 % (ref 0–7)
EPITH CASTS URNS QL MICRO: ABNORMAL /LPF
ERYTHROCYTE [DISTWIDTH] IN BLOOD BY AUTOMATED COUNT: 15 % (ref 11.5–14.5)
GLOBULIN SER CALC-MCNC: 3.5 G/DL (ref 2–4)
GLUCOSE BLD STRIP.AUTO-MCNC: 102 MG/DL (ref 74–106)
GLUCOSE BLD STRIP.AUTO-MCNC: 90 MG/DL (ref 74–106)
GLUCOSE SERPL-MCNC: 109 MG/DL (ref 65–100)
GLUCOSE UR STRIP.AUTO-MCNC: NEGATIVE MG/DL
HCO3 BLDA-SCNC: 19 MMOL/L
HCO3 BLDA-SCNC: 20 MMOL/L
HCT VFR BLD AUTO: 35.8 % (ref 35–47)
HGB BLD-MCNC: 12 G/DL (ref 11.5–16)
HGB UR QL STRIP: NEGATIVE
IMM GRANULOCYTES # BLD AUTO: 0 K/UL (ref 0–0.04)
IMM GRANULOCYTES NFR BLD AUTO: 0 % (ref 0–0.5)
KETONES UR QL STRIP.AUTO: ABNORMAL MG/DL
LACTATE BLD-SCNC: 0.7 MMOL/L (ref 0.4–2)
LACTATE BLD-SCNC: 1.95 MMOL/L (ref 0.4–2)
LEUKOCYTE ESTERASE UR QL STRIP.AUTO: ABNORMAL
LYMPHOCYTES # BLD: 0.2 K/UL (ref 0.8–3.5)
LYMPHOCYTES NFR BLD: 1 % (ref 12–49)
MCH RBC QN AUTO: 31.5 PG (ref 26–34)
MCHC RBC AUTO-ENTMCNC: 33.5 G/DL (ref 30–36.5)
MCV RBC AUTO: 94 FL (ref 80–99)
MONOCYTES # BLD: 1.8 K/UL (ref 0–1)
MONOCYTES NFR BLD: 8 % (ref 5–13)
NEUTS BAND NFR BLD MANUAL: 1 %
NEUTS SEG # BLD: 20.8 K/UL (ref 1.8–8)
NEUTS SEG NFR BLD: 90 % (ref 32–75)
NITRITE UR QL STRIP.AUTO: NEGATIVE
NRBC # BLD: 0 K/UL (ref 0–0.01)
NRBC BLD-RTO: 0 PER 100 WBC
PCO2 BLDV: 36.6 MMHG (ref 41–51)
PCO2 BLDV: 41.2 MMHG (ref 41–51)
PH BLDV: 7.29 (ref 7.32–7.42)
PH BLDV: 7.33 (ref 7.32–7.42)
PH UR STRIP: 6 (ref 5–8)
PLATELET # BLD AUTO: 283 K/UL (ref 150–400)
PMV BLD AUTO: 12.1 FL (ref 8.9–12.9)
PO2 BLDV: 29 MMHG (ref 25–40)
PO2 BLDV: 43 MMHG (ref 25–40)
POTASSIUM BLD-SCNC: 2.5 MMOL/L (ref 3.5–5.5)
POTASSIUM BLD-SCNC: 3.5 MMOL/L (ref 3.5–5.5)
POTASSIUM SERPL-SCNC: 4 MMOL/L (ref 3.5–5.1)
PROCALCITONIN SERPL-MCNC: 0.8 NG/ML
PROT SERPL-MCNC: 6.6 G/DL (ref 6.4–8.2)
PROT UR STRIP-MCNC: ABNORMAL MG/DL
RBC # BLD AUTO: 3.81 M/UL (ref 3.8–5.2)
RBC #/AREA URNS HPF: ABNORMAL /HPF (ref 0–5)
RBC MORPH BLD: ABNORMAL
SAO2 % BLD: 49 %
SAO2 % BLD: 76 %
SERVICE CMNT-IMP: ABNORMAL
SODIUM BLD-SCNC: 132 MMOL/L (ref 136–145)
SODIUM BLD-SCNC: 134 MMOL/L (ref 136–145)
SODIUM SERPL-SCNC: 133 MMOL/L (ref 136–145)
SP GR UR REFRACTOMETRY: 1.02
SPECIMEN SITE: ABNORMAL
SPECIMEN SITE: ABNORMAL
TROPONIN I SERPL HS-MCNC: 13 NG/L (ref 0–51)
URINE CULTURE IF INDICATED: ABNORMAL
UROBILINOGEN UR QL STRIP.AUTO: 1 EU/DL (ref 0.2–1)
WAXY CASTS URNS QL MICRO: ABNORMAL /LPF
WBC # BLD AUTO: 22.8 K/UL (ref 3.6–11)
WBC URNS QL MICRO: ABNORMAL /HPF (ref 0–4)
YEAST URNS QL MICRO: PRESENT

## 2023-12-04 PROCEDURE — 82803 BLOOD GASES ANY COMBINATION: CPT

## 2023-12-04 PROCEDURE — 87086 URINE CULTURE/COLONY COUNT: CPT

## 2023-12-04 PROCEDURE — 87040 BLOOD CULTURE FOR BACTERIA: CPT

## 2023-12-04 PROCEDURE — 2580000003 HC RX 258: Performed by: STUDENT IN AN ORGANIZED HEALTH CARE EDUCATION/TRAINING PROGRAM

## 2023-12-04 PROCEDURE — 85025 COMPLETE CBC W/AUTO DIFF WBC: CPT

## 2023-12-04 PROCEDURE — 96361 HYDRATE IV INFUSION ADD-ON: CPT

## 2023-12-04 PROCEDURE — 2580000003 HC RX 258: Performed by: INTERNAL MEDICINE

## 2023-12-04 PROCEDURE — 87077 CULTURE AEROBIC IDENTIFY: CPT

## 2023-12-04 PROCEDURE — 87186 SC STD MICRODIL/AGAR DIL: CPT

## 2023-12-04 PROCEDURE — 93005 ELECTROCARDIOGRAM TRACING: CPT | Performed by: STUDENT IN AN ORGANIZED HEALTH CARE EDUCATION/TRAINING PROGRAM

## 2023-12-04 PROCEDURE — 71045 X-RAY EXAM CHEST 1 VIEW: CPT

## 2023-12-04 PROCEDURE — 6360000002 HC RX W HCPCS: Performed by: STUDENT IN AN ORGANIZED HEALTH CARE EDUCATION/TRAINING PROGRAM

## 2023-12-04 PROCEDURE — 84132 ASSAY OF SERUM POTASSIUM: CPT

## 2023-12-04 PROCEDURE — 84295 ASSAY OF SERUM SODIUM: CPT

## 2023-12-04 PROCEDURE — 82947 ASSAY GLUCOSE BLOOD QUANT: CPT

## 2023-12-04 PROCEDURE — 99285 EMERGENCY DEPT VISIT HI MDM: CPT

## 2023-12-04 PROCEDURE — 6370000000 HC RX 637 (ALT 250 FOR IP): Performed by: INTERNAL MEDICINE

## 2023-12-04 PROCEDURE — 36415 COLL VENOUS BLD VENIPUNCTURE: CPT

## 2023-12-04 PROCEDURE — 84484 ASSAY OF TROPONIN QUANT: CPT

## 2023-12-04 PROCEDURE — 2580000003 HC RX 258: Performed by: NURSE PRACTITIONER

## 2023-12-04 PROCEDURE — 74176 CT ABD & PELVIS W/O CONTRAST: CPT

## 2023-12-04 PROCEDURE — 1100000003 HC PRIVATE W/ TELEMETRY

## 2023-12-04 PROCEDURE — 80053 COMPREHEN METABOLIC PANEL: CPT

## 2023-12-04 PROCEDURE — 96365 THER/PROPH/DIAG IV INF INIT: CPT

## 2023-12-04 PROCEDURE — 80162 ASSAY OF DIGOXIN TOTAL: CPT

## 2023-12-04 PROCEDURE — 84145 PROCALCITONIN (PCT): CPT

## 2023-12-04 PROCEDURE — 81001 URINALYSIS AUTO W/SCOPE: CPT

## 2023-12-04 PROCEDURE — 82550 ASSAY OF CK (CPK): CPT

## 2023-12-04 PROCEDURE — 82330 ASSAY OF CALCIUM: CPT

## 2023-12-04 RX ORDER — SODIUM CHLORIDE 0.9 % (FLUSH) 0.9 %
5-40 SYRINGE (ML) INJECTION PRN
Status: DISCONTINUED | OUTPATIENT
Start: 2023-12-04 | End: 2023-12-22

## 2023-12-04 RX ORDER — DIGOXIN 125 MCG
125 TABLET ORAL DAILY
Status: ON HOLD | COMMUNITY
End: 2023-12-14 | Stop reason: HOSPADM

## 2023-12-04 RX ORDER — PAROXETINE HYDROCHLORIDE 20 MG/1
10 TABLET, FILM COATED ORAL DAILY
Status: DISCONTINUED | OUTPATIENT
Start: 2023-12-04 | End: 2023-12-22

## 2023-12-04 RX ORDER — SODIUM CHLORIDE 9 MG/ML
INJECTION, SOLUTION INTRAVENOUS PRN
Status: DISCONTINUED | OUTPATIENT
Start: 2023-12-04 | End: 2023-12-22

## 2023-12-04 RX ORDER — DONEPEZIL HYDROCHLORIDE 5 MG/1
10 TABLET, FILM COATED ORAL NIGHTLY
Status: DISCONTINUED | OUTPATIENT
Start: 2023-12-04 | End: 2023-12-22

## 2023-12-04 RX ORDER — 0.9 % SODIUM CHLORIDE 0.9 %
1000 INTRAVENOUS SOLUTION INTRAVENOUS ONCE
Status: COMPLETED | OUTPATIENT
Start: 2023-12-04 | End: 2023-12-04

## 2023-12-04 RX ORDER — POLYETHYLENE GLYCOL 3350 17 G/17G
17 POWDER, FOR SOLUTION ORAL DAILY PRN
Status: DISCONTINUED | OUTPATIENT
Start: 2023-12-04 | End: 2023-12-22

## 2023-12-04 RX ORDER — SODIUM CHLORIDE 0.9 % (FLUSH) 0.9 %
5-40 SYRINGE (ML) INJECTION EVERY 12 HOURS SCHEDULED
Status: DISCONTINUED | OUTPATIENT
Start: 2023-12-04 | End: 2023-12-22

## 2023-12-04 RX ORDER — ACETAMINOPHEN 650 MG/1
650 SUPPOSITORY RECTAL EVERY 6 HOURS PRN
Status: DISCONTINUED | OUTPATIENT
Start: 2023-12-04 | End: 2023-12-23 | Stop reason: HOSPADM

## 2023-12-04 RX ORDER — ONDANSETRON 2 MG/ML
4 INJECTION INTRAMUSCULAR; INTRAVENOUS EVERY 6 HOURS PRN
Status: DISCONTINUED | OUTPATIENT
Start: 2023-12-04 | End: 2023-12-23 | Stop reason: HOSPADM

## 2023-12-04 RX ORDER — ONDANSETRON 4 MG/1
4 TABLET, ORALLY DISINTEGRATING ORAL EVERY 8 HOURS PRN
Status: DISCONTINUED | OUTPATIENT
Start: 2023-12-04 | End: 2023-12-22

## 2023-12-04 RX ORDER — SODIUM CHLORIDE 9 MG/ML
INJECTION, SOLUTION INTRAVENOUS CONTINUOUS
Status: DISCONTINUED | OUTPATIENT
Start: 2023-12-04 | End: 2023-12-10

## 2023-12-04 RX ORDER — AMIODARONE HYDROCHLORIDE 200 MG/1
200 TABLET ORAL DAILY
Status: DISCONTINUED | OUTPATIENT
Start: 2023-12-05 | End: 2023-12-22

## 2023-12-04 RX ORDER — 0.9 % SODIUM CHLORIDE 0.9 %
30 INTRAVENOUS SOLUTION INTRAVENOUS ONCE
Status: DISCONTINUED | OUTPATIENT
Start: 2023-12-04 | End: 2023-12-04

## 2023-12-04 RX ORDER — ACETAMINOPHEN 325 MG/1
650 TABLET ORAL EVERY 6 HOURS PRN
Status: DISCONTINUED | OUTPATIENT
Start: 2023-12-04 | End: 2023-12-23 | Stop reason: HOSPADM

## 2023-12-04 RX ADMIN — SODIUM CHLORIDE 1000 ML: 9 INJECTION, SOLUTION INTRAVENOUS at 16:04

## 2023-12-04 RX ADMIN — SODIUM CHLORIDE: 9 INJECTION, SOLUTION INTRAVENOUS at 17:44

## 2023-12-04 RX ADMIN — SODIUM CHLORIDE, PRESERVATIVE FREE 10 ML: 5 INJECTION INTRAVENOUS at 21:26

## 2023-12-04 RX ADMIN — SODIUM CHLORIDE: 9 INJECTION, SOLUTION INTRAVENOUS at 21:51

## 2023-12-04 RX ADMIN — SODIUM CHLORIDE 1000 ML: 9 INJECTION, SOLUTION INTRAVENOUS at 13:06

## 2023-12-04 RX ADMIN — DONEPEZIL HYDROCHLORIDE 10 MG: 5 TABLET, FILM COATED ORAL at 21:19

## 2023-12-04 RX ADMIN — SODIUM CHLORIDE 1000 MG: 900 INJECTION INTRAVENOUS at 13:04

## 2023-12-04 RX ADMIN — SODIUM CHLORIDE 1000 ML: 9 INJECTION, SOLUTION INTRAVENOUS at 18:37

## 2023-12-04 NOTE — ED NOTES
Attempted to call attending Dr. Martine Tamayo about the patient's BP. No call service available, sent to voicemail with a full voicemail box.      Zach Luciano RN  12/04/23 6111

## 2023-12-04 NOTE — H&P
Hospitalist Admission Note    NAME: Stuart Javed   :  1950   MRN:  [id number]     Date/Time:  2023 4:23 PM    Patient PCP: John Newsome MD  ______________________________________________________________________  Given the patient's current clinical presentation, I have a high level of concern for decompensation if discharged from the emergency department. Given the patient's current clinical presentation, I have a high level of concern for decompensation if patient is discharged from the emergency department. Patient will be admitted as an inpatient with an estimated LOS of 2 days    My assessment of this patient's clinical condition and my plan of care is as follows. Assessment / Plan:    Sepsis due to UTI  -UA with 10-20 wbc, 4+ bacteria. Ucx pending  -Blood cultures sent  -CT abdomen nothing acute  -CXR clear  -continue rocephin IV    SAMUEL  Hyponatremia  Volume depletion  Hypotension 98/34  -poor intake recently  -hold bumex and lsinopril  -IVFs    Acute metabolic encephalopathy  Generalized weakness  -non focal.  Speech okay. Likely related to UTI, sepsis and SAMUEL  -PT OT eval and treat    Afib   HTN  -continue amiodarone and xarelto  -hold digoxin given SAMUEL, pending digoxin level  -continue statin  -hold lisinopril and bumex due to SAMUEL    Dementia  Depression  -continue paxil and aricept    Medical Decision Making:  Labs reviewed by myself: CBC, BMP  Diagnostic data reviewed by myself:  CXR, CT abdomen   Toxic drug monitoring:  Discussed case with emergency room physician . After discussion I am in agreement that acuity of patient's medical condition necessitates hospital stay. 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      Subjective:   CHIEF COMPLAINT:  found altered and weak    HISTORY OF PRESENT ILLNESS:     Alex Valdivia is a 68 y.o. female with a history of Afib,

## 2023-12-04 NOTE — CODE DOCUMENTATION
RAPID RESPONSE TEAM    Responded to overhead page in room CD35. RN reports /34 (56). Attempts to reach attending were unsuccessful, and he is concerned about the low MAP. Hospitalist responded and ordered NS 1L bolus. Last /42 (68) after approximately 500mL. Labs reviewed. Rocephin on MAR. Please call back if needed.     Nestor Ward RN  Ext. [phone]

## 2023-12-04 NOTE — ED TRIAGE NOTES
Cleaned pt of incontinence urine and stool. The stool brown pasty very dried on her.  And pt fully red of entire buttocks , hips and tops posterior thighs, with open redness at buttocks crease

## 2023-12-04 NOTE — ED PROVIDER NOTES
hospitals EMERGENCY DEPT  EMERGENCY DEPARTMENT ENCOUNTER       Pt Name: Jose Sung  MRN: [id number]  [facility] 1950  Date of evaluation: 12/4/2023  Provider: Savannah Borges MD   PCP: Alivia Bradley MD  Note Started: 12:24 PM EST 12/4/23     CHIEF COMPLAINT       Chief Complaint   Patient presents with    Fatigue     Arrival from home for weakness, she slid to the end of her bed-rescue bp 96/46 -98% room air bgl 130- pt was positioned with her feet on floor but layed back on the bed. Pt had sepsis in July for a uti-feeling weak a day or two. Pt has a sore throat. HISTORY OF PRESENT ILLNESS: 1 or more elements      History From: Patient and EMS  HPI Limitations: Other (confusion)     Jose Sung is a 68 y.o. female who presents for evaluation of generalized fatigue, weakness, unable to stand. EMS was called by the patient's daughter. EMS states that when they found her she was sitting at the end of her bed, she had stool over her. She was unable to stand. Patient was drowsy but responsive. Blood pressure was low with systolics in the 33W prior to arrival.  Patient has no complaints of pain. She reports feeling nauseous, generalized fatigue, weakness. No reports of injuries. Patient states he lives alone. No medications given prior to arrival.  History limited secondary to patient's drowsiness. Denies drug or alcohol use. Nursing Notes were all reviewed and agreed with or any disagreements were addressed in the HPI. REVIEW OF SYSTEMS      Review of Systems     Positives and Pertinent negatives as per HPI.     PAST HISTORY     Past Medical History:  Past Medical History:   Diagnosis Date    Arrhythmia     ATRIAL FIB    Hypertension          Past Surgical History:  Past Surgical History:   Procedure Laterality Date    ABLATION OF DYSRHYTHMIC FOCUS      APPENDECTOMY      CATARACT REMOVAL Bilateral     CHOLECYSTECTOMY      COLONOSCOPY N/A 3/16/2023    COLONOSCOPY performed by

## 2023-12-05 LAB
ANION GAP SERPL CALC-SCNC: 11 MMOL/L (ref 5–15)
BASOPHILS # BLD: 0.1 K/UL (ref 0–0.1)
BASOPHILS NFR BLD: 0 % (ref 0–1)
BUN SERPL-MCNC: 49 MG/DL (ref 6–20)
BUN/CREAT SERPL: 31 (ref 12–20)
CALCIUM SERPL-MCNC: 7.4 MG/DL (ref 8.5–10.1)
CHLORIDE SERPL-SCNC: 111 MMOL/L (ref 97–108)
CO2 SERPL-SCNC: 19 MMOL/L (ref 21–32)
CREAT SERPL-MCNC: 1.57 MG/DL (ref 0.55–1.02)
DIFFERENTIAL METHOD BLD: ABNORMAL
EOSINOPHIL # BLD: 0 K/UL (ref 0–0.4)
EOSINOPHIL NFR BLD: 0 % (ref 0–7)
ERYTHROCYTE [DISTWIDTH] IN BLOOD BY AUTOMATED COUNT: 15.1 % (ref 11.5–14.5)
GLUCOSE SERPL-MCNC: 77 MG/DL (ref 65–100)
HCT VFR BLD AUTO: 28.3 % (ref 35–47)
HGB BLD-MCNC: 9.5 G/DL (ref 11.5–16)
IMM GRANULOCYTES # BLD AUTO: 0.1 K/UL (ref 0–0.04)
IMM GRANULOCYTES NFR BLD AUTO: 1 % (ref 0–0.5)
LYMPHOCYTES # BLD: 1 K/UL (ref 0.8–3.5)
LYMPHOCYTES NFR BLD: 7 % (ref 12–49)
MAGNESIUM SERPL-MCNC: 1.6 MG/DL (ref 1.6–2.4)
MCH RBC QN AUTO: 31 PG (ref 26–34)
MCHC RBC AUTO-ENTMCNC: 33.6 G/DL (ref 30–36.5)
MCV RBC AUTO: 92.5 FL (ref 80–99)
MONOCYTES # BLD: 1 K/UL (ref 0–1)
MONOCYTES NFR BLD: 7 % (ref 5–13)
NEUTS SEG # BLD: 11.9 K/UL (ref 1.8–8)
NEUTS SEG NFR BLD: 85 % (ref 32–75)
NRBC # BLD: 0 K/UL (ref 0–0.01)
NRBC BLD-RTO: 0 PER 100 WBC
PLATELET # BLD AUTO: 193 K/UL (ref 150–400)
PMV BLD AUTO: 11.5 FL (ref 8.9–12.9)
POTASSIUM SERPL-SCNC: 2.4 MMOL/L (ref 3.5–5.1)
POTASSIUM SERPL-SCNC: 3.2 MMOL/L (ref 3.5–5.1)
RBC # BLD AUTO: 3.06 M/UL (ref 3.8–5.2)
SODIUM SERPL-SCNC: 141 MMOL/L (ref 136–145)
WBC # BLD AUTO: 14.1 K/UL (ref 3.6–11)

## 2023-12-05 PROCEDURE — 6370000000 HC RX 637 (ALT 250 FOR IP): Performed by: INTERNAL MEDICINE

## 2023-12-05 PROCEDURE — 2580000003 HC RX 258: Performed by: INTERNAL MEDICINE

## 2023-12-05 PROCEDURE — 1100000003 HC PRIVATE W/ TELEMETRY

## 2023-12-05 PROCEDURE — 97530 THERAPEUTIC ACTIVITIES: CPT | Performed by: OCCUPATIONAL THERAPIST

## 2023-12-05 PROCEDURE — 85025 COMPLETE CBC W/AUTO DIFF WBC: CPT

## 2023-12-05 PROCEDURE — 36415 COLL VENOUS BLD VENIPUNCTURE: CPT

## 2023-12-05 PROCEDURE — 97535 SELF CARE MNGMENT TRAINING: CPT | Performed by: OCCUPATIONAL THERAPIST

## 2023-12-05 PROCEDURE — 97166 OT EVAL MOD COMPLEX 45 MIN: CPT | Performed by: OCCUPATIONAL THERAPIST

## 2023-12-05 PROCEDURE — 6360000002 HC RX W HCPCS: Performed by: INTERNAL MEDICINE

## 2023-12-05 PROCEDURE — 80048 BASIC METABOLIC PNL TOTAL CA: CPT

## 2023-12-05 PROCEDURE — 2580000003 HC RX 258: Performed by: STUDENT IN AN ORGANIZED HEALTH CARE EDUCATION/TRAINING PROGRAM

## 2023-12-05 PROCEDURE — 97161 PT EVAL LOW COMPLEX 20 MIN: CPT

## 2023-12-05 PROCEDURE — 6360000002 HC RX W HCPCS: Performed by: STUDENT IN AN ORGANIZED HEALTH CARE EDUCATION/TRAINING PROGRAM

## 2023-12-05 PROCEDURE — 99233 SBSQ HOSP IP/OBS HIGH 50: CPT | Performed by: INTERNAL MEDICINE

## 2023-12-05 PROCEDURE — 2060000000 HC ICU INTERMEDIATE R&B

## 2023-12-05 PROCEDURE — 83735 ASSAY OF MAGNESIUM: CPT

## 2023-12-05 PROCEDURE — 97116 GAIT TRAINING THERAPY: CPT

## 2023-12-05 PROCEDURE — 84132 ASSAY OF SERUM POTASSIUM: CPT

## 2023-12-05 PROCEDURE — 97530 THERAPEUTIC ACTIVITIES: CPT

## 2023-12-05 RX ORDER — CASTOR OIL AND BALSAM, PERU 788; 87 MG/G; MG/G
OINTMENT TOPICAL 2 TIMES DAILY
Status: DISCONTINUED | OUTPATIENT
Start: 2023-12-05 | End: 2023-12-14

## 2023-12-05 RX ORDER — POTASSIUM CHLORIDE 7.45 MG/ML
10 INJECTION INTRAVENOUS
Status: COMPLETED | OUTPATIENT
Start: 2023-12-05 | End: 2023-12-05

## 2023-12-05 RX ADMIN — DONEPEZIL HYDROCHLORIDE 10 MG: 5 TABLET, FILM COATED ORAL at 20:27

## 2023-12-05 RX ADMIN — Medication: at 10:47

## 2023-12-05 RX ADMIN — POTASSIUM CHLORIDE 10 MEQ: 10 INJECTION, SOLUTION INTRAVENOUS at 13:55

## 2023-12-05 RX ADMIN — PAROXETINE HYDROCHLORIDE 10 MG: 20 TABLET, FILM COATED ORAL at 10:44

## 2023-12-05 RX ADMIN — POTASSIUM CHLORIDE 10 MEQ: 10 INJECTION, SOLUTION INTRAVENOUS at 12:49

## 2023-12-05 RX ADMIN — POTASSIUM CHLORIDE 10 MEQ: 10 INJECTION, SOLUTION INTRAVENOUS at 10:57

## 2023-12-05 RX ADMIN — SODIUM CHLORIDE, PRESERVATIVE FREE 10 ML: 5 INJECTION INTRAVENOUS at 20:28

## 2023-12-05 RX ADMIN — POTASSIUM CHLORIDE 10 MEQ: 10 INJECTION, SOLUTION INTRAVENOUS at 11:41

## 2023-12-05 RX ADMIN — SODIUM CHLORIDE 1000 MG: 900 INJECTION INTRAVENOUS at 10:46

## 2023-12-05 RX ADMIN — Medication: at 20:28

## 2023-12-05 RX ADMIN — RIVAROXABAN 15 MG: 15 TABLET, FILM COATED ORAL at 10:57

## 2023-12-05 RX ADMIN — POTASSIUM CHLORIDE 10 MEQ: 10 INJECTION, SOLUTION INTRAVENOUS at 15:07

## 2023-12-05 RX ADMIN — SODIUM CHLORIDE: 9 INJECTION, SOLUTION INTRAVENOUS at 10:43

## 2023-12-05 RX ADMIN — SODIUM CHLORIDE: 9 INJECTION, SOLUTION INTRAVENOUS at 22:05

## 2023-12-05 RX ADMIN — AMIODARONE HYDROCHLORIDE 200 MG: 200 TABLET ORAL at 10:44

## 2023-12-05 NOTE — ED NOTES
Attempted to give report, nurse was in the middle of giving report to another nurse, they said they will call back     Naty Rivas  12/04/23 1927

## 2023-12-05 NOTE — PLAN OF CARE
Problem: Occupational Therapy - Adult  Goal: By Discharge: Performs self-care activities at highest level of function for planned discharge setting. See evaluation for individualized goals. Description: FUNCTIONAL STATUS PRIOR TO ADMISSION:  Lives alone in an apartment and was Mod I with basic self-care and light IADL. Receives Help From: Family     HOME SUPPORT: Her daughter lives nearby, and assists as needed. Occupational Therapy Goals:  Initiated 12/5/2023  1. Patient will perform grooming with Modified [place] within 7 day(s). 2.  Patient will perform lower body dressing with Modified [place] within 7 day(s). 3.  Patient will perform bathing with Supervision within 7 day(s). 4.  Patient will perform toilet transfers with Modified [place]  within 7 day(s). 5.  Patient will perform all aspects of toileting with Modified [place] within 7 day(s). 6.  Patient will independently utilize energy conservation and recommended equipment during ADL within 7 day(s). Outcome: Progressing     OCCUPATIONAL THERAPY EVALUATION    Patient: Mabel Johansen (75 y.o. female)  Date: 12/5/2023  Primary Diagnosis: Nausea [R11.0]  Transaminitis [R74.01]  SAMUEL (acute kidney injury) ([address]) [N17.9]  Acute cystitis without hematuria [N30.00]  Sepsis ([address]) [A41.9]  Severe sepsis ([address]) [A41.9, R65.20]         Precautions:                    ASSESSMENT :  The patient is limited by decreased functional mobility, independence in ADLs, high-level IADLs, strength, activity tolerance, safety awareness, balance, short term memory . Patient was agreeable to therapy session. She is able to manage bed mobility with SBA and functional transfer with CGA and cues. Patient requires up to [address] with basic self-care, which is slightly below her baseline level. She will benefit from skilled OT treatment to maximize independence and safety in ADL. Functional Outcome Measure:   The patient scored 21/24 on the [facility] education needed    Thank you for this referral.  Pineda Broderick, OTR/L  Minutes: 28    Occupational Therapy Evaluation Charge Determination   History Examination Decision-Making   MEDIUM Complexity : Expanded review of history including physical, cognitive and psychocial history  HIGH Complexity: 5 Performance deficits relating to physical, cognitive, or psychosocial skills that result in activity limitations and/or participation restrictions  MEDIUM Complexity: Patient may present with comorbidities that affect occupational performance.  Minimal to moderate modifications of tasks or assist (eg. physical or verbal) with assist is necessary to enable pt to complete eval   Based on the above components, the patient evaluation is determined to be of the following complexity level: Medium

## 2023-12-05 NOTE — ED NOTES
Attempted to call report to PCU, no answer when calling the floor     Clearance NESSA Fernandes  12/04/23 7213

## 2023-12-05 NOTE — PROGRESS NOTES
TRANSFER - IN REPORT:    Verbal report received from Marifer on [employer]  being received from ED  for change in patient condition ( )      Report consisted of patient's Situation, Background, Assessment and   Recommendations(SBAR). Information from the following report(s) Nurse Handoff Report, Index, ED Encounter Summary, ED SBAR, Intake/Output, MAR, Recent Results, Med Rec Status, Cardiac Rhythm  , Alarm Parameters, Neuro Assessment, and Event Log was reviewed with the receiving nurse. Opportunity for questions and clarification was provided. Assessment completed upon patient's arrival to unit and care assumed. 0600: Epic is down unable to obtain due lab works      End of Shift Note    Bedside shift change report given to [address] [place] [address] (oncoming nurse) by Apple Galicia RN (offgoing nurse). Report included the following information SBAR, Kardex, Intake/Output, MAR, Recent Results, and Med Rec Status    Shift worked:  7pm-7am     Shift summary and any significant changes:     Blood pressure remained stable for the shift. Maintenance IVF still going @100 cc/hr, voiced nil complaints throughout the night, patient slept well. Observation and care continues     Concerns for physician to address:  xxx     Zone phone for oncoming shift:   xxx       Activity:     Number times ambulated in hallways past shift: 0  Number of times OOB to chair past shift: 0    Cardiac:   Cardiac Monitoring: Yes           Access:  Current line(s): PIV     Genitourinary:   Urinary status: voiding and external catheter    Respiratory:      Chronic home O2 use?: NO  Incentive spirometer at bedside: NO       GI:     Current diet:  ADULT DIET;  Regular  Passing flatus: YES  Tolerating current diet: YES       Pain Management:   Patient states pain is manageable on current regimen: YES    Skin:     Interventions: turn team, specialty bed, float heels, increase time out of bed, foam dressing, PT/OT consult, internal/external urinary devices, and nutritional support    Patient Safety:  Fall Score:    Interventions: bed/chair alarm, assistive device (walker, cane.  etc), gripper socks, pt to call before getting OOB, and stay with me (per policy)       Length of Stay:  Expected LOS: 3  Actual LOS: 1      Isaias Harden RN

## 2023-12-05 NOTE — PLAN OF CARE
Problem: Physical Therapy - Adult  Goal: By Discharge: Performs mobility at highest level of function for planned discharge setting. See evaluation for individualized goals. Description: FUNCTIONAL STATUS PRIOR TO ADMISSION: Patient was modified independent using a rolling walker for functional mobility. HOME SUPPORT PRIOR TO ADMISSION: The patient lived alone with supportive daughter to provide assistance. Physical Therapy Goals  Initiated 12/5/2023  1. Patient will move from supine to sit and sit to supine, scoot up and down, and roll side to side in bed with modified independence within 7 day(s). 2.  Patient will perform sit to stand with modified independence within 7 day(s). 3.  Patient will transfer from bed to chair and chair to bed with modified independence using the least restrictive device within 7 day(s). 4.  Patient will ambulate with modified independence for 50 feet with the least restrictive device within 7 day(s). 5.  Patient will ascend/descend 5 stairs with handrail(s) with supervision/set-up within 7 day(s). Outcome: Progressing   PHYSICAL THERAPY EVALUATION    Patient: Jessie Miranda (53 y.o. female)  Date: 12/5/2023  Primary Diagnosis: Nausea [R11.0]  Transaminitis [R74.01]  SAMUEL (acute kidney injury) ([address]) [N17.9]  Acute cystitis without hematuria [N30.00]  Sepsis ([address]) [A41.9]  Severe sepsis ([address]) [A41.9, R65.20]       Precautions:                      ASSESSMENT :   DEFICITS/IMPAIRMENTS:   The patient is limited by decreased strength, activity tolerance, endurance, cognition, balance, and nausea. Pt received in bed, agreeable to PT evaluation. Her wound vac is in place. Pt moving fairly well and just appears to be functioning slightly below her baseline. She was able to come to sit eob, stand, and ambulate a short distance to the recliner. Once seated instructed in BLE seated exercises. VSS t/o session. Pt left seated in recliner.   Recommend HHPT at

## 2023-12-05 NOTE — PLAN OF CARE
Problem: Discharge Planning  Goal: Discharge to home or other facility with appropriate resources  Outcome: Progressing     Problem: Pain  Goal: Verbalizes/displays adequate comfort level or baseline comfort level  Outcome: Progressing  Flowsheets (Taken 12/4/2023 2000)  Verbalizes/displays adequate comfort level or baseline comfort level:   Implement non-pharmacological measures as appropriate and evaluate response   Consider cultural and social influences on pain and pain management     Problem: Safety - Adult  Goal: Free from fall injury  Outcome: Progressing

## 2023-12-05 NOTE — PLAN OF CARE
Problem: Discharge Planning  Goal: Discharge to home or other facility with appropriate resources  12/5/2023 1154 by Swetha Alvarez RN  Outcome: Progressing  Flowsheets (Taken 12/5/2023 0813)  Discharge to home or other facility with appropriate resources: Identify barriers to discharge with patient and caregiver  12/5/2023 0628 by Ludin Howard RN  Outcome: Progressing     Problem: Pain  Goal: Verbalizes/displays adequate comfort level or baseline comfort level  12/5/2023 1154 by Swetha Alvarez RN  Outcome: Progressing  Flowsheets (Taken 12/5/2023 0813)  Verbalizes/displays adequate comfort level or baseline comfort level: Encourage patient to monitor pain and request assistance  12/5/2023 0628 by Ludin Howard RN  Outcome: Progressing  Flowsheets (Taken 12/4/2023 2000)  Verbalizes/displays adequate comfort level or baseline comfort level:   Implement non-pharmacological measures as appropriate and evaluate response   Consider cultural and social influences on pain and pain management     Problem: Safety - Adult  Goal: Free from fall injury  12/5/2023 1154 by Swetha Alvarez RN  Outcome: Progressing  12/5/2023 0628 by Ludin Howard RN  Outcome: Progressing

## 2023-12-05 NOTE — PROGRESS NOTES
4 Eyes Skin Assessment     NAME:  Moy Camarena  YOB: 1950  MEDICAL RECORD NUMBER:  [id number]    The patient is being assessed for  Admission    I agree that at least one RN has performed a thorough Head to Toe Skin Assessment on the patient. ALL assessment sites listed below have been assessed. Areas assessed by both nurses:    Head, Face, Ears, Shoulders, Back, Chest, Arms, Elbows, Hands, Sacrum. Buttock, Coccyx, Ischium, Legs. Feet and Heels, Under Medical Devices , and Other          Does the Patient have a Wound? Yes wound(s) were present on assessment.  LDA wound assessment was Initiated and completed by RN       James Prevention initiated by RN: Yes  Wound Care Orders initiated by RN: Yes    Pressure Injury (Stage 3,4, Unstageable, DTI, NWPT, and Complex wounds) if present, place Wound referral order by RN under : No    New Ostomies, if present place, Ostomy referral order under : No     Nurse 1 eSignature: Electronically signed by Jerilyn Caicedo RN on 12/5/23 at 6:41 AM EST    **SHARE this note so that the co-signing nurse can place an eSignature**    Nurse 2 eSignature: {Esignature:[id number]}

## 2023-12-06 ENCOUNTER — TELEPHONE (OUTPATIENT)
Age: 73
End: 2023-12-06

## 2023-12-06 ENCOUNTER — APPOINTMENT (OUTPATIENT)
Facility: HOSPITAL | Age: 73
DRG: 871 | End: 2023-12-06
Attending: STUDENT IN AN ORGANIZED HEALTH CARE EDUCATION/TRAINING PROGRAM
Payer: MEDICARE

## 2023-12-06 LAB
ALBUMIN SERPL-MCNC: 2.1 G/DL (ref 3.5–5)
ALBUMIN/GLOB SERPL: 0.8 (ref 1.1–2.2)
ALP SERPL-CCNC: 125 U/L (ref 45–117)
ALT SERPL-CCNC: 1043 U/L (ref 12–78)
ANION GAP SERPL CALC-SCNC: 8 MMOL/L (ref 5–15)
ANION GAP SERPL CALC-SCNC: 9 MMOL/L (ref 5–15)
AST SERPL-CCNC: 1653 U/L (ref 15–37)
BASOPHILS # BLD: 0.1 K/UL (ref 0–0.1)
BASOPHILS NFR BLD: 1 % (ref 0–1)
BILIRUB SERPL-MCNC: 0.9 MG/DL (ref 0.2–1)
BUN SERPL-MCNC: 32 MG/DL (ref 6–20)
BUN SERPL-MCNC: 36 MG/DL (ref 6–20)
BUN/CREAT SERPL: 33 (ref 12–20)
BUN/CREAT SERPL: 35 (ref 12–20)
CALCIUM SERPL-MCNC: 7.4 MG/DL (ref 8.5–10.1)
CALCIUM SERPL-MCNC: 7.8 MG/DL (ref 8.5–10.1)
CHLORIDE SERPL-SCNC: 116 MMOL/L (ref 97–108)
CHLORIDE SERPL-SCNC: 116 MMOL/L (ref 97–108)
CO2 SERPL-SCNC: 19 MMOL/L (ref 21–32)
CO2 SERPL-SCNC: 20 MMOL/L (ref 21–32)
CREAT SERPL-MCNC: 0.96 MG/DL (ref 0.55–1.02)
CREAT SERPL-MCNC: 1.03 MG/DL (ref 0.55–1.02)
DIFFERENTIAL METHOD BLD: ABNORMAL
ECHO AO ROOT DIAM: 2.7 CM
ECHO AO ROOT INDEX: 1.55 CM/M2
ECHO AR MAX VEL PISA: 4.2 M/S
ECHO AV AREA PEAK VELOCITY: 2.1 CM2
ECHO AV AREA PEAK VELOCITY: 2.2 CM2
ECHO AV AREA VTI: 2.2 CM2
ECHO AV AREA/BSA VTI: 1.3 CM2/M2
ECHO AV CUSP MM: 1.7 CM
ECHO AV MEAN GRADIENT: 6 MMHG
ECHO AV MEAN VELOCITY: 1.2 M/S
ECHO AV PEAK GRADIENT: 14 MMHG
ECHO AV PEAK GRADIENT: 14 MMHG
ECHO AV PEAK VELOCITY: 1.9 M/S
ECHO AV PEAK VELOCITY: 1.9 M/S
ECHO AV REGURGITANT PHT: 437.6 MILLISECOND
ECHO AV VTI: 39.9 CM
ECHO BSA: 1.78 M2
ECHO EST RA PRESSURE: 10 MMHG
ECHO LA DIAMETER INDEX: 2.24 CM/M2
ECHO LA DIAMETER: 3.9 CM
ECHO LA TO AORTIC ROOT RATIO: 1.44
ECHO LA VOL A-L A2C: 61 ML (ref 22–52)
ECHO LA VOL A-L A4C: 80 ML (ref 22–52)
ECHO LA VOL MOD A2C: 56 ML (ref 22–52)
ECHO LA VOL MOD A4C: 76 ML (ref 22–52)
ECHO LA VOLUME AREA LENGTH: 74 ML
ECHO LA VOLUME INDEX A-L A2C: 35 ML/M2 (ref 16–34)
ECHO LA VOLUME INDEX A-L A4C: 46 ML/M2 (ref 16–34)
ECHO LA VOLUME INDEX AREA LENGTH: 43 ML/M2 (ref 16–34)
ECHO LA VOLUME INDEX MOD A2C: 32 ML/M2 (ref 16–34)
ECHO LA VOLUME INDEX MOD A4C: 44 ML/M2 (ref 16–34)
ECHO LV E' LATERAL VELOCITY: 9 CM/S
ECHO LV E' SEPTAL VELOCITY: 9 CM/S
ECHO LV FRACTIONAL SHORTENING: 36 % (ref 28–44)
ECHO LV INTERNAL DIMENSION DIASTOLE INDEX: 2.41 CM/M2
ECHO LV INTERNAL DIMENSION DIASTOLIC: 4.2 CM (ref 3.9–5.3)
ECHO LV INTERNAL DIMENSION SYSTOLIC INDEX: 1.55 CM/M2
ECHO LV INTERNAL DIMENSION SYSTOLIC: 2.7 CM
ECHO LV IVSD: 1 CM (ref 0.6–0.9)
ECHO LV MASS 2D: 137.2 G (ref 67–162)
ECHO LV MASS INDEX 2D: 78.9 G/M2 (ref 43–95)
ECHO LV POSTERIOR WALL DIASTOLIC: 1 CM (ref 0.6–0.9)
ECHO LV RELATIVE WALL THICKNESS RATIO: 0.48
ECHO LVOT AREA: 3.1 CM2
ECHO LVOT AV VTI INDEX: 0.72
ECHO LVOT DIAM: 2 CM
ECHO LVOT MEAN GRADIENT: 3 MMHG
ECHO LVOT PEAK GRADIENT: 7 MMHG
ECHO LVOT PEAK GRADIENT: 7 MMHG
ECHO LVOT PEAK VELOCITY: 1.3 M/S
ECHO LVOT PEAK VELOCITY: 1.3 M/S
ECHO LVOT STROKE VOLUME INDEX: 51.6 ML/M2
ECHO LVOT SV: 89.8 ML
ECHO LVOT VTI: 28.6 CM
ECHO MV A VELOCITY: 0.47 M/S
ECHO MV AREA VTI: 2.7 CM2
ECHO MV E DECELERATION TIME (DT): 141.4 MS
ECHO MV E VELOCITY: 1.08 M/S
ECHO MV E/A RATIO: 2.3
ECHO MV E/E' LATERAL: 12
ECHO MV E/E' RATIO (AVERAGED): 12
ECHO MV LVOT VTI INDEX: 1.14
ECHO MV MAX VELOCITY: 1.3 M/S
ECHO MV MEAN GRADIENT: 2 MMHG
ECHO MV MEAN VELOCITY: 0.6 M/S
ECHO MV PEAK GRADIENT: 7 MMHG
ECHO MV VTI: 32.7 CM
ECHO PV MAX VELOCITY: 1.1 M/S
ECHO PV PEAK GRADIENT: 5 MMHG
ECHO RIGHT VENTRICULAR SYSTOLIC PRESSURE (RVSP): 28 MMHG
ECHO RV FREE WALL PEAK S': 17 CM/S
ECHO RV INTERNAL DIMENSION: 4.5 CM
ECHO TV REGURGITANT MAX VELOCITY: 2.11 M/S
ECHO TV REGURGITANT PEAK GRADIENT: 18 MMHG
EKG ATRIAL RATE: 56 BPM
EKG DIAGNOSIS: NORMAL
EKG P AXIS: 62 DEGREES
EKG P-R INTERVAL: 190 MS
EKG Q-T INTERVAL: 574 MS
EKG QRS DURATION: 112 MS
EKG QTC CALCULATION (BAZETT): 553 MS
EKG R AXIS: -19 DEGREES
EKG T AXIS: 66 DEGREES
EKG VENTRICULAR RATE: 56 BPM
EOSINOPHIL # BLD: 0.2 K/UL (ref 0–0.4)
EOSINOPHIL NFR BLD: 2 % (ref 0–7)
ERYTHROCYTE [DISTWIDTH] IN BLOOD BY AUTOMATED COUNT: 15.1 % (ref 11.5–14.5)
GLOBULIN SER CALC-MCNC: 2.6 G/DL (ref 2–4)
GLUCOSE SERPL-MCNC: 72 MG/DL (ref 65–100)
GLUCOSE SERPL-MCNC: 76 MG/DL (ref 65–100)
HCT VFR BLD AUTO: 26.7 % (ref 35–47)
HGB BLD-MCNC: 9.1 G/DL (ref 11.5–16)
IMM GRANULOCYTES # BLD AUTO: 0 K/UL (ref 0–0.04)
IMM GRANULOCYTES NFR BLD AUTO: 1 % (ref 0–0.5)
LYMPHOCYTES # BLD: 1.1 K/UL (ref 0.8–3.5)
LYMPHOCYTES NFR BLD: 14 % (ref 12–49)
MAGNESIUM SERPL-MCNC: 1.5 MG/DL (ref 1.6–2.4)
MCH RBC QN AUTO: 31.6 PG (ref 26–34)
MCHC RBC AUTO-ENTMCNC: 34.1 G/DL (ref 30–36.5)
MCV RBC AUTO: 92.7 FL (ref 80–99)
MONOCYTES # BLD: 0.8 K/UL (ref 0–1)
MONOCYTES NFR BLD: 9 % (ref 5–13)
NEUTS SEG # BLD: 6.2 K/UL (ref 1.8–8)
NEUTS SEG NFR BLD: 73 % (ref 32–75)
NRBC # BLD: 0 K/UL (ref 0–0.01)
NRBC BLD-RTO: 0 PER 100 WBC
PLATELET # BLD AUTO: 166 K/UL (ref 150–400)
PMV BLD AUTO: 11.2 FL (ref 8.9–12.9)
POTASSIUM SERPL-SCNC: 3 MMOL/L (ref 3.5–5.1)
POTASSIUM SERPL-SCNC: 3.6 MMOL/L (ref 3.5–5.1)
PROT SERPL-MCNC: 4.7 G/DL (ref 6.4–8.2)
RBC # BLD AUTO: 2.88 M/UL (ref 3.8–5.2)
SODIUM SERPL-SCNC: 144 MMOL/L (ref 136–145)
SODIUM SERPL-SCNC: 144 MMOL/L (ref 136–145)
WBC # BLD AUTO: 8.4 K/UL (ref 3.6–11)

## 2023-12-06 PROCEDURE — 6360000002 HC RX W HCPCS: Performed by: STUDENT IN AN ORGANIZED HEALTH CARE EDUCATION/TRAINING PROGRAM

## 2023-12-06 PROCEDURE — 99233 SBSQ HOSP IP/OBS HIGH 50: CPT | Performed by: INTERNAL MEDICINE

## 2023-12-06 PROCEDURE — 85025 COMPLETE CBC W/AUTO DIFF WBC: CPT

## 2023-12-06 PROCEDURE — 6370000000 HC RX 637 (ALT 250 FOR IP): Performed by: INTERNAL MEDICINE

## 2023-12-06 PROCEDURE — 80053 COMPREHEN METABOLIC PANEL: CPT

## 2023-12-06 PROCEDURE — 36415 COLL VENOUS BLD VENIPUNCTURE: CPT

## 2023-12-06 PROCEDURE — 6370000000 HC RX 637 (ALT 250 FOR IP): Performed by: STUDENT IN AN ORGANIZED HEALTH CARE EDUCATION/TRAINING PROGRAM

## 2023-12-06 PROCEDURE — 2580000003 HC RX 258: Performed by: STUDENT IN AN ORGANIZED HEALTH CARE EDUCATION/TRAINING PROGRAM

## 2023-12-06 PROCEDURE — 83735 ASSAY OF MAGNESIUM: CPT

## 2023-12-06 PROCEDURE — 97530 THERAPEUTIC ACTIVITIES: CPT

## 2023-12-06 PROCEDURE — 2580000003 HC RX 258: Performed by: INTERNAL MEDICINE

## 2023-12-06 PROCEDURE — 1100000003 HC PRIVATE W/ TELEMETRY

## 2023-12-06 PROCEDURE — 97116 GAIT TRAINING THERAPY: CPT

## 2023-12-06 PROCEDURE — 6360000002 HC RX W HCPCS: Performed by: INTERNAL MEDICINE

## 2023-12-06 PROCEDURE — 93306 TTE W/DOPPLER COMPLETE: CPT

## 2023-12-06 PROCEDURE — A4216 STERILE WATER/SALINE, 10 ML: HCPCS | Performed by: INTERNAL MEDICINE

## 2023-12-06 RX ORDER — 0.9 % SODIUM CHLORIDE 0.9 %
500 INTRAVENOUS SOLUTION INTRAVENOUS ONCE
Status: COMPLETED | OUTPATIENT
Start: 2023-12-06 | End: 2023-12-06

## 2023-12-06 RX ORDER — POTASSIUM CHLORIDE 20 MEQ/1
20 TABLET, EXTENDED RELEASE ORAL ONCE
Status: COMPLETED | OUTPATIENT
Start: 2023-12-06 | End: 2023-12-06

## 2023-12-06 RX ORDER — LOPERAMIDE HYDROCHLORIDE 2 MG/1
2 CAPSULE ORAL 4 TIMES DAILY PRN
Status: DISCONTINUED | OUTPATIENT
Start: 2023-12-06 | End: 2023-12-22

## 2023-12-06 RX ORDER — MAGNESIUM SULFATE 1 G/100ML
1000 INJECTION INTRAVENOUS ONCE
Status: COMPLETED | OUTPATIENT
Start: 2023-12-06 | End: 2023-12-06

## 2023-12-06 RX ORDER — MIDODRINE HYDROCHLORIDE 5 MG/1
5 TABLET ORAL
Status: DISCONTINUED | OUTPATIENT
Start: 2023-12-06 | End: 2023-12-22

## 2023-12-06 RX ORDER — POTASSIUM CHLORIDE 1.5 G/1.58G
40 POWDER, FOR SOLUTION ORAL DAILY
Status: DISCONTINUED | OUTPATIENT
Start: 2023-12-06 | End: 2023-12-06

## 2023-12-06 RX ADMIN — MIDODRINE HYDROCHLORIDE 5 MG: 5 TABLET ORAL at 16:35

## 2023-12-06 RX ADMIN — POTASSIUM BICARBONATE 40 MEQ: 782 TABLET, EFFERVESCENT ORAL at 09:13

## 2023-12-06 RX ADMIN — SODIUM CHLORIDE, PRESERVATIVE FREE 10 ML: 5 INJECTION INTRAVENOUS at 20:05

## 2023-12-06 RX ADMIN — MIDODRINE HYDROCHLORIDE 5 MG: 5 TABLET ORAL at 11:35

## 2023-12-06 RX ADMIN — SODIUM CHLORIDE: 9 INJECTION, SOLUTION INTRAVENOUS at 19:35

## 2023-12-06 RX ADMIN — Medication: at 09:14

## 2023-12-06 RX ADMIN — AMIODARONE HYDROCHLORIDE 200 MG: 200 TABLET ORAL at 09:14

## 2023-12-06 RX ADMIN — PAROXETINE HYDROCHLORIDE 10 MG: 20 TABLET, FILM COATED ORAL at 09:14

## 2023-12-06 RX ADMIN — MAGNESIUM SULFATE HEPTAHYDRATE 1000 MG: 1 INJECTION, SOLUTION INTRAVENOUS at 15:06

## 2023-12-06 RX ADMIN — SODIUM CHLORIDE 1000 MG: 900 INJECTION INTRAVENOUS at 12:34

## 2023-12-06 RX ADMIN — SODIUM CHLORIDE: 9 INJECTION, SOLUTION INTRAVENOUS at 09:13

## 2023-12-06 RX ADMIN — RIVAROXABAN 15 MG: 15 TABLET, FILM COATED ORAL at 09:14

## 2023-12-06 RX ADMIN — LOPERAMIDE HYDROCHLORIDE 2 MG: 2 CAPSULE ORAL at 11:35

## 2023-12-06 RX ADMIN — SODIUM CHLORIDE 500 ML: 9 INJECTION, SOLUTION INTRAVENOUS at 10:51

## 2023-12-06 RX ADMIN — DONEPEZIL HYDROCHLORIDE 10 MG: 5 TABLET, FILM COATED ORAL at 20:05

## 2023-12-06 RX ADMIN — POTASSIUM CHLORIDE 20 MEQ: 1500 TABLET, EXTENDED RELEASE ORAL at 15:07

## 2023-12-06 RX ADMIN — Medication: at 20:05

## 2023-12-06 RX ADMIN — SODIUM CHLORIDE 1000 MG: 9 INJECTION INTRAMUSCULAR; INTRAVENOUS; SUBCUTANEOUS at 16:35

## 2023-12-06 RX ADMIN — LOPERAMIDE HYDROCHLORIDE 2 MG: 2 CAPSULE ORAL at 20:07

## 2023-12-06 NOTE — TELEPHONE ENCOUNTER
[address] [facility]    Phone [phone]      States: Will dr follow for [facility]  PT  OT  Skilled nursing  Please call to advise            Appointments:  Last seen by PCP:   11/2/2023   Last seen at [facility]:   11/2/2023   Future appointment [facility]:  Visit date not found                     Caller confirms readback of documented phone/fax number(s) as correct.

## 2023-12-06 NOTE — WOUND CARE
Wound care nurse consult for POA sacral wound. Chart reviewed and patient assessed. 69 y/o CF admitted for sepsis. Past Medical History:   Diagnosis Date    Arrhythmia     ATRIAL FIB    Hypertension      Past Surgical History:   Procedure Laterality Date    ABLATION OF DYSRHYTHMIC FOCUS      APPENDECTOMY      CATARACT REMOVAL Bilateral     CHOLECYSTECTOMY      COLONOSCOPY N/A 3/16/2023    COLONOSCOPY performed by Miranda Cuevas MD at [facility]  3/16/2023    GASTRIC BYPASS SURGERY  01/01/1994    LUMBAR FUSION  2016    L4-5    OTHER SURGICAL HISTORY Right 2018    Fell and hit coffee table; bleeding--vascular proceedure R thigh. TONSILLECTOMY       Patient incontinent of urine and Purewick in use. Sacrum has a sacral/coccyx Stage 2 PI measuring aprox 0.8 x 0.8 x 5.8KP complicated by MASD/ICD from incontinence. Blanchable erythema surrounding/denuded      Recommend    Sacrum: Daily and prn soiling of dressing. Cleanse skin with soap and water, pat dry and apply a smear of Z-guard zinc cream over wound ans cover with samll sacral foam dressing.     [facility] Chucky Gama

## 2023-12-06 NOTE — CARE COORDINATION
Care Management Initial Assessment       RUR: 17%   Readmission? No  1st IM letter given? Yes - 12/5/23  1st  letter given: No         12/06/23 0945   Service Assessment   Patient Orientation Unable to Assess   History Provided By Child/Family   Primary [address] [place] [address]   Patient's Healthcare Decision Maker is: Legal Next of Kin   PCP Verified by CM Yes  (Pt sees Dr. Juan M Sin.)   Last Visit to PCP Within last 3 months  (\"last month\")   Prior Functional Level Independent in ADLs/IADLs   Current Functional Level Independent in ADLs/IADLs   Can patient return to prior living arrangement Yes   Ability to make needs known: Good   Family able to assist with home care needs: Yes   Financial Resources [facility] Resources Other (Comment)  (Pt was open to [facility]. Daughter would not like to resume with them.)   Social/Functional History   Lives With Alone   Type of Home Apartment  (1 floor with 4 YOUNG)   Home Equipment Cane;Walker, rolling  (shower chair)   Active  No   Discharge Planning   Type of Residence Apartment         11:45 a.m. [facility] cannot accept. CM sent to [facility].     11:34 a.m. Pt's SEVERO is 12/8/23. CM sent referral to [facility]. CM called pt's room but did not receive an answer. CM called pt's daughter and introduced self/role, verified demographics and completed initial assessment. Pt lives alone with an apt. Daughter lives close by. Pt was open to [facility]. Daughter would prefer to use a different company at d/c. Pt has a cane, walker and shower chair. Pt was previously in SNF at [facility]. Pt uses the [facility] pharmacy. Pt's daughter will transport pt at d/c.  CM offered FOC. [facility], just one that will accept pt's insurance.       Julio Hartley, ANH  Supervisee in [address]

## 2023-12-06 NOTE — PROGRESS NOTES
Comprehensive Nutrition Assessment    Type and Reason for Visit:  Initial, Wound    Nutrition Recommendations/Plan:   Continue current diet  Trying Magic cups BID  Please document % meals and supplements consumed in flowsheet I/O's under intake      Malnutrition Assessment:  Malnutrition Status:  Severe malnutrition (12/06/23 1611)    Context:  Chronic Illness     Findings of the 6 clinical characteristics of malnutrition:  Energy Intake:  75% or less estimated energy requirements for 1 month or longer  Weight Loss:  Greater than 20% over 1 year     Body Fat Loss:  Severe body fat loss (Moderate) Orbital, Triceps   Muscle Mass Loss:  Severe muscle mass loss (Moderate) Temples (temporalis), Clavicles (pectoralis & deltoids), Hand (interosseous), Scapula (trapezius)  Fluid Accumulation:  Severe Extremities, Generalized   Strength:  Not Performed    Nutrition Assessment:     Chart reviewed for documented wounds. Pt medically noted for sepsis, UTI, SAMUEL, hypotension, acute encephalopathy, afib, HTN, dementia. Pt reports her appetite has been significantly decreased for the last 2 weeks. Per EMR, she has been losing weight aggressively for the last 9 months (down 46#, 22% since March which is severe for the time frame). Significant fat and muscle wasting present on nutrition focused physical exam, meeting ASPEN criteria for malnutrition. Pt is not open to Ensure shakes but she did agree to try Magic cups. She admitted to eating very little over the last few days. Will keep diet liberalized to optimize intake and follow up.      Patient Vitals for the past 120 hrs:   PO Meals Eaten (%)   12/06/23 1506 0%   12/06/23 0913 1 - 25%   12/05/23 1706 1 - 25%   12/05/23 1250 0%   12/05/23 1152 0%   12/05/23 0900 0%     Wt Readings from Last 5 Encounters:   12/04/23 72.6 kg (160 lb)   11/02/23 74.6 kg (164 lb 6.4 oz)   08/16/23 85.3 kg (188 lb)   07/12/23 90.3 kg (199 lb)   06/14/23 88.7 kg (195 lb 9.6 oz)   ]    Nutrition

## 2023-12-06 NOTE — PLAN OF CARE
Problem: Physical Therapy - Adult  Goal: By Discharge: Performs mobility at highest level of function for planned discharge setting. See evaluation for individualized goals. Description: FUNCTIONAL STATUS PRIOR TO ADMISSION: Patient was modified independent using a rolling walker for functional mobility. HOME SUPPORT PRIOR TO ADMISSION: The patient lived alone with supportive daughter to provide assistance. Physical Therapy Goals  Initiated 12/5/2023  1. Patient will move from supine to sit and sit to supine, scoot up and down, and roll side to side in bed with modified independence within 7 day(s). 2.  Patient will perform sit to stand with modified independence within 7 day(s). 3.  Patient will transfer from bed to chair and chair to bed with modified independence using the least restrictive device within 7 day(s). 4.  Patient will ambulate with modified independence for 50 feet with the least restrictive device within 7 day(s). 5.  Patient will ascend/descend 5 stairs with handrail(s) with supervision/set-up within 7 day(s). Outcome: Progressing   PHYSICAL THERAPY TREATMENT    Patient: Suzzane Severe (68 y.o. female)  Date: 12/6/2023  Diagnosis: Nausea [R11.0]  Transaminitis [R74.01]  SAMUEL (acute kidney injury) ([address]) [N17.9]  Acute cystitis without hematuria [N30.00]  Sepsis ([address]) [A41.9]  Severe sepsis ([address]) [A41.9, R65.20] Sepsis ([address])      Precautions:                      ASSESSMENT:  Patient continues to benefit from skilled PT services and is progressing towards goals. She is able to ambulate in to the restroom with use of a RW demonstrating shuffled slow gait pattern. Patient is seated on commode in order to complete hygiene secondary to bowel incontinence. Patient reports nausea during clean up. Bedside chair is brought to the restroom in order to increase ease of transfer. Patient expels a large amount of watery stool on attempt to stand and becomes unresponsive.  RR is called and Poor    After treatment:   Patient left in care of RN and RR team      COMMUNICATION/EDUCATION:   The patient's plan of care was discussed with: registered nurse           Matheus Valdez PT  Minutes: 56

## 2023-12-06 NOTE — PROGRESS NOTES
Rapid response was called at 1030 today,pt had pt up to bathroom, pt developed loose stools and passed out. Assisted pt to get pt into a chair. Pt received a fluid bolus , imodium, and midodrine. 1141 pt went into svt for a short while and cardiology consult was placed.  Pt did receive  mag and potassium replacement today

## 2023-12-06 NOTE — SIGNIFICANT EVENT
RAPID RESPONSE TEAM    Overhead rapid response paged to room #2322 at 1025     Reason for rapid response:  Syncope, hypotension    Initial assessment:  Pt sitting reclined in chair, breathing normally, complaining of mild dizziness. Hypotension present on monitor. Per primary RN, pt was on the toilet, had new onset diarrhea and then went unresponsive while sitting; unsure of exact amount of time, but stated the patient was unconscious for a couple minutes. RRT called, staff carried patient to chair. Pt assisted by staff back to bed, pt feeling mildly dizzy, worsening hypotension secondary to standing/ambulation noted on repeat BP. Dr. Karon Harvey at bedside, orders received for the following Interventions: 500mL bolus NS, midodrine scheduled, repeat BMP/magnesium at 1400, echocardiogram, imodium prn. Outcome:  pt to remain in room #2322    Please call with any questions or concerns    Florencia Gonzalez.  Nitza Moser RN  Rapid Response Team  Ext [phone]    Recent Results (from the past 8 hour(s))   Echo (TTE) complete (PRN contrast/bubble/strain/3D)    Collection Time: 12/06/23  1:28 PM   Result Value Ref Range    IVSd 1.0 (A) 0.6 - 0.9 cm    LVIDd 4.2 3.9 - 5.3 cm    LVIDs 2.7 cm    LVOT Diameter 2.0 cm    LVPWd 1.0 (A) 0.6 - 0.9 cm    LVOT Peak Gradient 7 mmHg    LVOT Peak Gradient 7 mmHg    LVOT Mean Gradient 3 mmHg    LVOT SV 89.8 ml    LVOT Peak Velocity 1.3 m/s    LVOT Peak Velocity 1.3 m/s    LVOT VTI 28.6 cm    RVIDd 4.5 cm    RV Free Wall Peak S' 17 cm/s    LA Diameter 3.9 cm    LA Volume A/L 74 mL    LA Volume A-L A4C 61 (A) 22 - 52 mL    LA Volume A-L A4C 80 (A) 22 - 52 mL    LA Volume MOD A2C 56 (A) 22 - 52 mL    LA Volume MOD A4C 76 (A) 22 - 52 mL    AV Cusp Mmode 1.7 cm    AV Area by Peak Velocity 2.1 cm2    AV Area by Peak Velocity 2.1 cm2    AV Area by Peak Velocity 2.1 cm2    AV Area by Peak Velocity 2.2 cm2    AV Area by VTI 2.2 cm2    AR .6 millisecond    AR Max Velocity PISA 4.2 m/s    AV Peak

## 2023-12-06 NOTE — PROGRESS NOTES
Occupational Therapy    Patient just had rapid response called due to passing out on toilet while working with PT. Will defer OT today and follow back tomorrow.     Stiven Jules, OTR/L

## 2023-12-07 LAB
ALBUMIN SERPL-MCNC: 2 G/DL (ref 3.5–5)
ALBUMIN/GLOB SERPL: 0.8 (ref 1.1–2.2)
ALP SERPL-CCNC: 130 U/L (ref 45–117)
ALT SERPL-CCNC: 865 U/L (ref 12–78)
ANION GAP SERPL CALC-SCNC: 7 MMOL/L (ref 5–15)
AST SERPL-CCNC: 1145 U/L (ref 15–37)
BACTERIA SPEC CULT: ABNORMAL
BACTERIA SPEC CULT: ABNORMAL
BASOPHILS # BLD: 0.1 K/UL (ref 0–0.1)
BASOPHILS NFR BLD: 1 % (ref 0–1)
BILIRUB SERPL-MCNC: 1 MG/DL (ref 0.2–1)
BUN SERPL-MCNC: 23 MG/DL (ref 6–20)
BUN/CREAT SERPL: 31 (ref 12–20)
CALCIUM SERPL-MCNC: 7.7 MG/DL (ref 8.5–10.1)
CC UR VC: ABNORMAL
CHLORIDE SERPL-SCNC: 117 MMOL/L (ref 97–108)
CO2 SERPL-SCNC: 18 MMOL/L (ref 21–32)
CREAT SERPL-MCNC: 0.75 MG/DL (ref 0.55–1.02)
DIFFERENTIAL METHOD BLD: ABNORMAL
EOSINOPHIL # BLD: 0.3 K/UL (ref 0–0.4)
EOSINOPHIL NFR BLD: 4 % (ref 0–7)
ERYTHROCYTE [DISTWIDTH] IN BLOOD BY AUTOMATED COUNT: 15.6 % (ref 11.5–14.5)
GLOBULIN SER CALC-MCNC: 2.6 G/DL (ref 2–4)
GLUCOSE SERPL-MCNC: 64 MG/DL (ref 65–100)
HCT VFR BLD AUTO: 29.2 % (ref 35–47)
HGB BLD-MCNC: 9.5 G/DL (ref 11.5–16)
IMM GRANULOCYTES # BLD AUTO: 0.1 K/UL (ref 0–0.04)
IMM GRANULOCYTES NFR BLD AUTO: 1 % (ref 0–0.5)
LYMPHOCYTES # BLD: 1.5 K/UL (ref 0.8–3.5)
LYMPHOCYTES NFR BLD: 16 % (ref 12–49)
MCH RBC QN AUTO: 31.8 PG (ref 26–34)
MCHC RBC AUTO-ENTMCNC: 32.5 G/DL (ref 30–36.5)
MCV RBC AUTO: 97.7 FL (ref 80–99)
MONOCYTES # BLD: 0.9 K/UL (ref 0–1)
MONOCYTES NFR BLD: 10 % (ref 5–13)
NEUTS SEG # BLD: 6.3 K/UL (ref 1.8–8)
NEUTS SEG NFR BLD: 69 % (ref 32–75)
NRBC # BLD: 0 K/UL (ref 0–0.01)
NRBC BLD-RTO: 0 PER 100 WBC
PLATELET # BLD AUTO: 157 K/UL (ref 150–400)
PMV BLD AUTO: 11.7 FL (ref 8.9–12.9)
POTASSIUM SERPL-SCNC: 3.5 MMOL/L (ref 3.5–5.1)
PROT SERPL-MCNC: 4.6 G/DL (ref 6.4–8.2)
RBC # BLD AUTO: 2.99 M/UL (ref 3.8–5.2)
SERVICE CMNT-IMP: ABNORMAL
SODIUM SERPL-SCNC: 142 MMOL/L (ref 136–145)
WBC # BLD AUTO: 9.2 K/UL (ref 3.6–11)

## 2023-12-07 PROCEDURE — 6370000000 HC RX 637 (ALT 250 FOR IP): Performed by: STUDENT IN AN ORGANIZED HEALTH CARE EDUCATION/TRAINING PROGRAM

## 2023-12-07 PROCEDURE — 6370000000 HC RX 637 (ALT 250 FOR IP): Performed by: INTERNAL MEDICINE

## 2023-12-07 PROCEDURE — 2580000003 HC RX 258: Performed by: INTERNAL MEDICINE

## 2023-12-07 PROCEDURE — 6360000002 HC RX W HCPCS: Performed by: INTERNAL MEDICINE

## 2023-12-07 PROCEDURE — 1100000003 HC PRIVATE W/ TELEMETRY

## 2023-12-07 PROCEDURE — 36415 COLL VENOUS BLD VENIPUNCTURE: CPT

## 2023-12-07 PROCEDURE — 99233 SBSQ HOSP IP/OBS HIGH 50: CPT | Performed by: INTERNAL MEDICINE

## 2023-12-07 PROCEDURE — 85025 COMPLETE CBC W/AUTO DIFF WBC: CPT

## 2023-12-07 PROCEDURE — 80053 COMPREHEN METABOLIC PANEL: CPT

## 2023-12-07 RX ADMIN — AMIODARONE HYDROCHLORIDE 200 MG: 200 TABLET ORAL at 10:01

## 2023-12-07 RX ADMIN — SODIUM CHLORIDE: 9 INJECTION, SOLUTION INTRAVENOUS at 09:19

## 2023-12-07 RX ADMIN — MEROPENEM 1000 MG: 1 INJECTION, POWDER, FOR SOLUTION INTRAVENOUS at 10:08

## 2023-12-07 RX ADMIN — DONEPEZIL HYDROCHLORIDE 10 MG: 5 TABLET, FILM COATED ORAL at 21:37

## 2023-12-07 RX ADMIN — Medication: at 10:01

## 2023-12-07 RX ADMIN — Medication: at 21:37

## 2023-12-07 RX ADMIN — PAROXETINE HYDROCHLORIDE 10 MG: 20 TABLET, FILM COATED ORAL at 10:00

## 2023-12-07 RX ADMIN — MEROPENEM 1000 MG: 1 INJECTION, POWDER, FOR SOLUTION INTRAVENOUS at 17:14

## 2023-12-07 RX ADMIN — SODIUM CHLORIDE, PRESERVATIVE FREE 10 ML: 5 INJECTION INTRAVENOUS at 21:38

## 2023-12-07 RX ADMIN — MIDODRINE HYDROCHLORIDE 5 MG: 5 TABLET ORAL at 12:44

## 2023-12-07 RX ADMIN — MEROPENEM 1000 MG: 1 INJECTION, POWDER, FOR SOLUTION INTRAVENOUS at 00:17

## 2023-12-07 RX ADMIN — SODIUM CHLORIDE, PRESERVATIVE FREE 10 ML: 5 INJECTION INTRAVENOUS at 10:01

## 2023-12-07 RX ADMIN — MIDODRINE HYDROCHLORIDE 5 MG: 5 TABLET ORAL at 10:00

## 2023-12-07 RX ADMIN — POTASSIUM BICARBONATE 40 MEQ: 782 TABLET, EFFERVESCENT ORAL at 10:00

## 2023-12-07 RX ADMIN — MIDODRINE HYDROCHLORIDE 5 MG: 5 TABLET ORAL at 17:09

## 2023-12-07 NOTE — CARE COORDINATION
Transition of Care Plan:    RUR: 18%   Prior Level of Functioning: independent   Disposition: home with [facility] home health  If SNF or IPR: Date FOC offered:   Date FOC received:   Accepting facility:   Date authorization started with reference number:   Date authorization received and expires: Follow up appointments: PCP, Specialists  DME needed: none  Transportation at discharge: Pt's daughter  IM/IMM Medicare/ letter given: needed at d/c  Is patient a  and connected with [facility]? If yes, was [employer] transfer form completed and [facility] notified? Caregiver Contact: Gladys Pascal (Child)  [phone] (Home Phone)   Discharge Caregiver contacted prior to discharge? Care Conference needed? Barriers to discharge:     CM checked referrals. [facility] accepted for home health.   CM will place on AVS.      Jakub Landry LMSW  Supervisee in [facility], [address] [place] [address]

## 2023-12-07 NOTE — TELEPHONE ENCOUNTER
"Reports to ED c c/o palpitations. Pt states symptoms started just after eating dinner. Patient also reports mild SOB. States "I am always SOB but it is a little bit worse tonight" Reports took 1 metoprolol @ 0645 pm 25 mg tablet.   " Called and spoke to [facility].  Gave the ok for hh, ot, pt

## 2023-12-07 NOTE — PROGRESS NOTES
EP/ ARRHYTHMIA/ CARDIOLOGY Progress Note    Patient ID:  Patient: Gayle Fisher  MRN: [id number]  Age: 68 y.o.  : 1950    Date of  Admission: 2023 12:04 PM   PCP:  Ariadna Navarro MD  Usual arrhythmia specialists:  Dc Santillan MD    Assessment:   Fainting likely due to orthostatic hypotension, confounded by her UTI. UTI with Klebsiella pnuemoniae. Atrial fibrillation with prior ablation. Was on propafenone prior, now on amiodarone. Elevated transaminases, unsure of the cause. Hypotension can be the cause of hepatic hypoperfusion, but still sorting this out. Digoxin level undetectable despite this as an OP medication. Hypokalemia. Chronic lymphedema. Full code. Plan:     Supportive care with hydration, antibiotics, K supplement. Continue anticoagulation. Her GFR supports 20 mg dose of Xarelto, but there may be history that led to reduced dosing. Will continue this reduced dose for now. Agree with reducing her amiodarone to 200 mg daily. Agree with holding atorvastatin. Will see if her transaminases normalize with the above moves. Improving. Agree with holding digoxin in the setting of renal hypoperfusion. Agree with midodrine for now. [x]       High complexity decision making was performed in this patient at high risk for decompensation with multiple organ involvement. Gayle Fisher is a 68 y.o. female with a history of admission for symptomatic UTI. She denies CP, syncope, dizziness, palpitations, orthopnea, PND, or edema. NO TIA or stroke like symptoms on my interview today. She feels weak. Better than on admission, but not at baseline. Denies symptoms of UTI such as dysuria, frequency, urgency. Per the hospitalist H&P:  Nicolas Fenton is a 68 y.o. female with a history of Afib, HTN, depression and dementia who was brought in by EMS for AMS and weakness.   Patient lives at home alone but her daughter checks in on her daily, either Right Ventricle Right ventricle size is normal. Normal systolic function. Right Atrium Right atrium size is normal.   Aortic Valve Valve structure is normal. Mild regurgitation. No stenosis. Mitral Valve Valve structure is normal. Mild regurgitation. No stenosis noted. Tricuspid Valve Valve structure is normal. Mild regurgitation. No stenosis noted. The estimated RVSP is 28 mmHg. Pulmonic Valve The pulmonic valve was not well visualized. Valve structure is normal. No regurgitation. No stenosis noted. Aorta Normal sized aortic root. Pericardium No pericardial effusion. Labs:  No results for input(s): \"CPK\", \"CKMB\" in the last 72 hours. Invalid input(s): \"CPKMB\", \"CKNDX\", \"TROIQ\"  Lab Results   Component Value Date/Time    CHOL 154 10/20/2022 12:04 PM    HDL 58 10/20/2022 12:04 PM     No results for input(s): \"INR\", \"APTT\" in the last 72 hours. Invalid input(s): \"PTP\"   Recent Labs     12/05/23  0806 12/05/23  1720 12/06/23  0547 12/06/23  1351 12/07/23  0437     --  144 144 142   K 2.4*   < > 3.0* 3.6 3.5   *  --  116* 116* 117*   CO2 19*  --  19* 20* 18*   BUN 49*  --  36* 32* 23*   WBC 14.1*  --  8.4  --  9.2   HGB 9.5*  --  9.1*  --  9.5*   HCT 28.3*  --  26.7*  --  29.2*     --  166  --  157    < > = values in this interval not displayed. Recent Labs     12/06/23  0547 12/07/23  0437   GLOB 2.6 2.6       No components found for: \"GLPOC\"  No results for input(s): \"PH\", \"PCO2\", \"PO2\" in the last 72 hours.       Basia Pradhan MD 12/07/23 4:31 PM

## 2023-12-07 NOTE — PROGRESS NOTES
End of Shift Note    Bedside shift change report given to NESSA Sims (oncoming nurse) by Margareth Swift. Celi Fair RN (offgoing nurse). Report included the following information SBAR, Kardex, MAR, and Recent Results    Shift worked:  07:00-19:00     Shift summary and any significant changes:    Patient was afraid to stand and get up in the the chair today. Patient was able to sit on the edge of the bed and eat lunch. Patient was not orthostatic (supine to sitting). Concerns for physician to address:       [employer] phone for oncoming shift:   [phone]       Activity:     Number times ambulated in hallways past shift: 0  Number of times OOB to chair past shift: 0    Cardiac:   Cardiac Monitoring: Yes           Access:  Current line(s): PIV     Genitourinary:   Urinary status: external catheter    Respiratory:      Chronic home O2 use?: NO  Incentive spirometer at bedside: NO       GI:     Current diet:  ADULT DIET; Regular  ADULT ORAL NUTRITION SUPPLEMENT; Lunch, Dinner; Frozen Oral Supplement  Passing flatus: YES  Tolerating current diet: YES       Pain Management:   Patient states pain is manageable on current regimen: N/A    Skin:     Interventions: float heels, increase time out of bed, foam dressing, and internal/external urinary devices    Patient Safety:  Fall Score:    Interventions: bed/chair alarm, gripper socks, pt to call before getting OOB, and stay with me (per policy)       Length of Stay:  Expected LOS: 4  Actual LOS: [address] [place].  [facility]

## 2023-12-08 LAB
ALBUMIN SERPL-MCNC: 2 G/DL (ref 3.5–5)
ALBUMIN/GLOB SERPL: 0.7 (ref 1.1–2.2)
ALP SERPL-CCNC: 136 U/L (ref 45–117)
ALT SERPL-CCNC: 685 U/L (ref 12–78)
ANION GAP SERPL CALC-SCNC: 7 MMOL/L (ref 5–15)
AST SERPL-CCNC: 720 U/L (ref 15–37)
BASOPHILS # BLD: 0.1 K/UL (ref 0–0.1)
BASOPHILS NFR BLD: 1 % (ref 0–1)
BILIRUB SERPL-MCNC: 0.7 MG/DL (ref 0.2–1)
BUN SERPL-MCNC: 17 MG/DL (ref 6–20)
BUN/CREAT SERPL: 28 (ref 12–20)
CALCIUM SERPL-MCNC: 7.9 MG/DL (ref 8.5–10.1)
CHLORIDE SERPL-SCNC: 116 MMOL/L (ref 97–108)
CO2 SERPL-SCNC: 21 MMOL/L (ref 21–32)
CREAT SERPL-MCNC: 0.6 MG/DL (ref 0.55–1.02)
DIFFERENTIAL METHOD BLD: ABNORMAL
EOSINOPHIL # BLD: 0.5 K/UL (ref 0–0.4)
EOSINOPHIL NFR BLD: 5 % (ref 0–7)
ERYTHROCYTE [DISTWIDTH] IN BLOOD BY AUTOMATED COUNT: 15.9 % (ref 11.5–14.5)
GLOBULIN SER CALC-MCNC: 2.8 G/DL (ref 2–4)
GLUCOSE SERPL-MCNC: 93 MG/DL (ref 65–100)
HCT VFR BLD AUTO: 30.3 % (ref 35–47)
HGB BLD-MCNC: 10 G/DL (ref 11.5–16)
IMM GRANULOCYTES # BLD AUTO: 0 K/UL (ref 0–0.04)
IMM GRANULOCYTES NFR BLD AUTO: 0 % (ref 0–0.5)
LYMPHOCYTES # BLD: 1.8 K/UL (ref 0.8–3.5)
LYMPHOCYTES NFR BLD: 17 % (ref 12–49)
MCH RBC QN AUTO: 31.5 PG (ref 26–34)
MCHC RBC AUTO-ENTMCNC: 33 G/DL (ref 30–36.5)
MCV RBC AUTO: 95.6 FL (ref 80–99)
MONOCYTES # BLD: 1 K/UL (ref 0–1)
MONOCYTES NFR BLD: 10 % (ref 5–13)
NEUTS SEG # BLD: 7 K/UL (ref 1.8–8)
NEUTS SEG NFR BLD: 67 % (ref 32–75)
NRBC # BLD: 0 K/UL (ref 0–0.01)
NRBC BLD-RTO: 0 PER 100 WBC
PLATELET # BLD AUTO: 159 K/UL (ref 150–400)
PMV BLD AUTO: 11.7 FL (ref 8.9–12.9)
POTASSIUM SERPL-SCNC: 3.9 MMOL/L (ref 3.5–5.1)
PROT SERPL-MCNC: 4.8 G/DL (ref 6.4–8.2)
RBC # BLD AUTO: 3.17 M/UL (ref 3.8–5.2)
SODIUM SERPL-SCNC: 144 MMOL/L (ref 136–145)
WBC # BLD AUTO: 10.4 K/UL (ref 3.6–11)

## 2023-12-08 PROCEDURE — 85025 COMPLETE CBC W/AUTO DIFF WBC: CPT

## 2023-12-08 PROCEDURE — 6370000000 HC RX 637 (ALT 250 FOR IP): Performed by: STUDENT IN AN ORGANIZED HEALTH CARE EDUCATION/TRAINING PROGRAM

## 2023-12-08 PROCEDURE — 6370000000 HC RX 637 (ALT 250 FOR IP): Performed by: INTERNAL MEDICINE

## 2023-12-08 PROCEDURE — 2580000003 HC RX 258: Performed by: INTERNAL MEDICINE

## 2023-12-08 PROCEDURE — 1100000003 HC PRIVATE W/ TELEMETRY

## 2023-12-08 PROCEDURE — 80053 COMPREHEN METABOLIC PANEL: CPT

## 2023-12-08 PROCEDURE — 6360000002 HC RX W HCPCS: Performed by: INTERNAL MEDICINE

## 2023-12-08 PROCEDURE — 99233 SBSQ HOSP IP/OBS HIGH 50: CPT | Performed by: INTERNAL MEDICINE

## 2023-12-08 PROCEDURE — 36415 COLL VENOUS BLD VENIPUNCTURE: CPT

## 2023-12-08 RX ADMIN — Medication: at 20:38

## 2023-12-08 RX ADMIN — SODIUM CHLORIDE, PRESERVATIVE FREE 10 ML: 5 INJECTION INTRAVENOUS at 20:39

## 2023-12-08 RX ADMIN — DONEPEZIL HYDROCHLORIDE 10 MG: 5 TABLET, FILM COATED ORAL at 20:39

## 2023-12-08 RX ADMIN — MIDODRINE HYDROCHLORIDE 5 MG: 5 TABLET ORAL at 13:22

## 2023-12-08 RX ADMIN — MIDODRINE HYDROCHLORIDE 5 MG: 5 TABLET ORAL at 16:59

## 2023-12-08 RX ADMIN — ACETAMINOPHEN 650 MG: 325 TABLET ORAL at 18:13

## 2023-12-08 RX ADMIN — SODIUM CHLORIDE, PRESERVATIVE FREE 10 ML: 5 INJECTION INTRAVENOUS at 08:49

## 2023-12-08 RX ADMIN — MEROPENEM 1000 MG: 1 INJECTION, POWDER, FOR SOLUTION INTRAVENOUS at 16:59

## 2023-12-08 RX ADMIN — AMIODARONE HYDROCHLORIDE 200 MG: 200 TABLET ORAL at 08:43

## 2023-12-08 RX ADMIN — Medication: at 08:49

## 2023-12-08 RX ADMIN — MIDODRINE HYDROCHLORIDE 5 MG: 5 TABLET ORAL at 08:43

## 2023-12-08 RX ADMIN — MEROPENEM 1000 MG: 1 INJECTION, POWDER, FOR SOLUTION INTRAVENOUS at 08:49

## 2023-12-08 RX ADMIN — RIVAROXABAN 20 MG: 20 TABLET, FILM COATED ORAL at 08:43

## 2023-12-08 RX ADMIN — MEROPENEM 1000 MG: 1 INJECTION, POWDER, FOR SOLUTION INTRAVENOUS at 23:40

## 2023-12-08 RX ADMIN — MEROPENEM 1000 MG: 1 INJECTION, POWDER, FOR SOLUTION INTRAVENOUS at 00:56

## 2023-12-08 RX ADMIN — POTASSIUM BICARBONATE 40 MEQ: 782 TABLET, EFFERVESCENT ORAL at 08:43

## 2023-12-08 RX ADMIN — SODIUM CHLORIDE: 9 INJECTION, SOLUTION INTRAVENOUS at 04:30

## 2023-12-08 RX ADMIN — SODIUM CHLORIDE: 9 INJECTION, SOLUTION INTRAVENOUS at 20:44

## 2023-12-08 RX ADMIN — PAROXETINE HYDROCHLORIDE 10 MG: 20 TABLET, FILM COATED ORAL at 08:44

## 2023-12-08 NOTE — PROGRESS NOTES
Occupational Therapy   Chart reviewed, patient states she has had nausea all day and that she cannot participate in therpay, declines repositioning or other assist; sipping on ginger ale. Will retry later as able.  Traci Bearden OTR/L

## 2023-12-08 NOTE — PROGRESS NOTES
EP/ ARRHYTHMIA/ CARDIOLOGY Progress Note    Patient ID:  Patient: Melanie Kay  MRN: [id number]  Age: 68 y.o.  : 1950    Date of  Admission: 2023 12:04 PM   PCP:  Chiquis Larson MD  Usual arrhythmia specialists:  Zondra Denver, MD    Assessment:   Fainting likely due to orthostatic hypotension, confounded by her UTI. UTI with Klebsiella pnuemoniae. Atrial fibrillation with prior ablation. Was on propafenone prior, now on amiodarone. Elevated transaminases, unsure of the cause. Hypotension can be the cause of hepatic hypoperfusion, but still sorting this out. Digoxin level undetectable despite this as an OP medication. Hypokalemia. Chronic lymphedema. Full code. Plan:     Supportive care with hydration, antibiotics, K supplement. Continue anticoagulation with Xarelto. Agree with reducing her amiodarone to 200 mg daily. Agree with holding atorvastatin. Will see if her transaminases normalize with the above moves. Improving. Will look into adding back digoxin this weekend. Continue midodrine. [x]       High complexity decision making was performed in this patient at high risk for decompensation with multiple organ involvement. Melanie Kay is a 68 y.o. female with a history of admission for symptomatic UTI. She denies CP, syncope, dizziness, palpitations, orthopnea, PND, or edema. NO TIA or stroke like symptoms on my interview today. She feels weak. Better than on admission, but not at baseline. Denies symptoms of UTI such as dysuria, frequency, urgency. Per the hospitalist H&P:  Raleigh Halsted is a 68 y.o. female with a history of Afib, HTN, depression and dementia who was brought in by EMS for AMS and weakness. Patient lives at home alone but her daughter checks in on her daily, either in person or by camera / phone.   Yesterday, the daughter checked the home cameras and noted that the patient went to bed early at around 6pm, something Mitral Valve Valve structure is normal. Mild regurgitation. No stenosis noted. Tricuspid Valve Valve structure is normal. Mild regurgitation. No stenosis noted. The estimated RVSP is 28 mmHg. Pulmonic Valve The pulmonic valve was not well visualized. Valve structure is normal. No regurgitation. No stenosis noted. Aorta Normal sized aortic root. Pericardium No pericardial effusion. Labs:  No results for input(s): \"CPK\", \"CKMB\" in the last 72 hours. Invalid input(s): \"CPKMB\", \"CKNDX\", \"TROIQ\"  Lab Results   Component Value Date/Time    CHOL 154 10/20/2022 12:04 PM    HDL 58 10/20/2022 12:04 PM     No results for input(s): \"INR\", \"APTT\" in the last 72 hours. Invalid input(s): \"PTP\"   Recent Labs     12/06/23  0547 12/06/23  1351 12/07/23  0437 12/08/23  0427    144 142 144   K 3.0* 3.6 3.5 3.9   * 116* 117* 116*   CO2 19* 20* 18* 21   BUN 36* 32* 23* 17   WBC 8.4  --  9.2 10.4   HGB 9.1*  --  9.5* 10.0*   HCT 26.7*  --  29.2* 30.3*     --  157 159       Recent Labs     12/06/23  0547 12/07/23  0437 12/08/23  0427   GLOB 2.6 2.6 2.8       No components found for: \"GLPOC\"  No results for input(s): \"PH\", \"PCO2\", \"PO2\" in the last 72 hours.       Roena Homans, MD 12/08/23 9:19 AM

## 2023-12-08 NOTE — CARE COORDINATION
Transition of Care Plan:     RUR: 18%   Prior Level of Functioning: independent   Disposition: SNF[facility]  If SNF or IPR: Date FOC offered:   Date FOC received:   Accepting facility:   Date authorization started with reference number:   Date authorization received and expires: Follow up appointments: PCP, Specialists  DME needed: none  Transportation at discharge: Pt's daughter  IM/IMM Medicare/ letter given: needed at d/c  Is patient a [place] and connected with [facility]? If yes, was [employer] transfer form completed and VA notified? Caregiver Contact: Gladys Pascal (Child)  [phone] (Home Phone)   Discharge Caregiver contacted prior to discharge? Care Conference needed? Barriers to discharge:     1:31 p.m. CM checked referral.  [facility] accepted. CM asked [address] [place] [address] [facility] to start auth. CM called and updated pt's daughter, Javan Abarca. CM informed in IDRs that therapy's new recommendation is SNF at d/c.  CM met with pt at bedside who asked that CM call her daughter. CM called pt's daughter who requested referral be sent to [address] [place] [address] [facility]. CM will send. CM left SNF list on bedside table and requested pt and daughter pick a few more choices. CM will follow up.     Jakub Landry LMSW  Supervisee in [facility], [address] [place] [address],  [address]

## 2023-12-08 NOTE — PROGRESS NOTES
Comprehensive Nutrition Assessment    Type and Reason for Visit:  Reassess    Nutrition Recommendations/Plan:   Continue current diet and supplements  Please document % meals and supplements consumed in flowsheet I/O's under intake   Consider appetite stimulant. Pt      Malnutrition Assessment:  Malnutrition Status:  Severe malnutrition (12/06/23 1611)    Context:  Chronic Illness     Findings of the 6 clinical characteristics of malnutrition:  Energy Intake:  75% or less estimated energy requirements for 1 month or longer  Weight Loss:  Greater than 20% over 1 year     Body Fat Loss:  Severe body fat loss (Moderate) Orbital, Triceps   Muscle Mass Loss:  Severe muscle mass loss (Moderate) Temples (temporalis), Clavicles (pectoralis & deltoids), Hand (interosseous), Scapula (trapezius)  Fluid Accumulation:  Severe Extremities, Generalized   Strength:  Not Performed    Nutrition Assessment:     Chart reviewed and case discussed during IDR. Pt seen laying in bed this morning. She reports her appetite still has not been doing very well and she has not yet tried the Magic cups. Pt is a poor historian today and unable to provide much detail. RD attempted to gather anything pt would like to eat today, but pt could not name anything. She says her daughter works here and might be able to bring her food. Continue to encourage intake of meals and supplements. Patient Vitals for the past 120 hrs:   PO Meals Eaten (%)   12/06/23 1616 0%   12/06/23 1506 0%   12/06/23 0913 1 - 25%   12/05/23 1706 1 - 25%   12/05/23 1250 0%   12/05/23 1152 0%   12/05/23 0900 0%       Nutrition Related Findings:    Labs: elevated LFTs improving. Meds: merrem, effer-K, NS. Edema: 2+ generalized. BM 12/7. Wound Type: Stage I (L heel, R heel), Stage II (coccyx), Pressure Injury       Current Nutrition Intake & Therapies:    Average Meal Intake: 1-25%  Average Supplements Intake: Unable to assess  ADULT DIET;  Regular  ADULT ORAL

## 2023-12-08 NOTE — PROGRESS NOTES
End of Shift Note    Bedside shift change report given to, Hilario Womack RN (oncoming nurse) by Jarrett Tucker RN (offgoing nurse). Report included the following information SBAR, Kardex, MAR, and Recent Results    Shift worked:  [id number]     Shift summary and any significant changes:    Patient stays in bed this shift, assist with karen-care, no concern noted. .      Concerns for physician to address:       Zone phone for oncoming shift:   [phone]       Activity:     Number times ambulated in hallways past shift: 0  Number of times OOB to chair past shift: 0    Cardiac:   Cardiac Monitoring: Yes           Access:  Current line(s): PIV     Genitourinary:   Urinary status: external catheter    Respiratory:      Chronic home O2 use?: NO  Incentive spirometer at bedside: NO       GI:     Current diet:  ADULT DIET;  Regular  ADULT ORAL NUTRITION SUPPLEMENT; Lunch, Dinner; Frozen Oral Supplement  Passing flatus: YES  Tolerating current diet: YES       Pain Management:   Patient states pain is manageable on current regimen: N/A    Skin:     Interventions: float heels, increase time out of bed, foam dressing, and internal/external urinary devices    Patient Safety:  Fall Score:    Interventions: bed/chair alarm, gripper socks, pt to call before getting OOB, and stay with me (per policy)       Length of Stay:  Expected LOS: 4  Actual LOS: [place], RN

## 2023-12-09 LAB
-: NORMAL
ALBUMIN SERPL-MCNC: 1.9 G/DL (ref 3.5–5)
ALBUMIN/GLOB SERPL: 0.6 (ref 1.1–2.2)
ALP SERPL-CCNC: 146 U/L (ref 45–117)
ALT SERPL-CCNC: 474 U/L (ref 12–78)
ANION GAP SERPL CALC-SCNC: 2 MMOL/L (ref 5–15)
APPEARANCE UR: CLEAR
AST SERPL-CCNC: 302 U/L (ref 15–37)
BACTERIA URNS QL MICRO: NEGATIVE /HPF
BILIRUB SERPL-MCNC: 0.8 MG/DL (ref 0.2–1)
BILIRUB UR QL CFM: NEGATIVE
BUN SERPL-MCNC: 12 MG/DL (ref 6–20)
BUN/CREAT SERPL: 18 (ref 12–20)
CALCIUM SERPL-MCNC: 8.2 MG/DL (ref 8.5–10.1)
CHLORIDE SERPL-SCNC: 115 MMOL/L (ref 97–108)
CO2 SERPL-SCNC: 24 MMOL/L (ref 21–32)
COLOR UR: ABNORMAL
CREAT SERPL-MCNC: 0.65 MG/DL (ref 0.55–1.02)
EPITH CASTS URNS QL MICRO: ABNORMAL /LPF
GLOBULIN SER CALC-MCNC: 3 G/DL (ref 2–4)
GLUCOSE SERPL-MCNC: 142 MG/DL (ref 65–100)
GLUCOSE UR STRIP.AUTO-MCNC: NEGATIVE MG/DL
HAV IGM SER QL: NONREACTIVE
HBV CORE IGM SER QL: NONREACTIVE
HBV SURFACE AG SER QL: <0.1 INDEX
HBV SURFACE AG SER QL: NEGATIVE
HCV AB SER IA-ACNC: 0.07 INDEX
HCV AB SERPL QL IA: NONREACTIVE
HGB UR QL STRIP: NEGATIVE
KETONES UR QL STRIP.AUTO: 15 MG/DL
LEUKOCYTE ESTERASE UR QL STRIP.AUTO: ABNORMAL
NITRITE UR QL STRIP.AUTO: NEGATIVE
PH UR STRIP: 6 (ref 5–8)
POTASSIUM SERPL-SCNC: 4.7 MMOL/L (ref 3.5–5.1)
PROT SERPL-MCNC: 4.9 G/DL (ref 6.4–8.2)
PROT UR STRIP-MCNC: 30 MG/DL
RBC #/AREA URNS HPF: ABNORMAL /HPF (ref 0–5)
SODIUM SERPL-SCNC: 141 MMOL/L (ref 136–145)
SP GR UR REFRACTOMETRY: 1.02
URINE CULTURE IF INDICATED: ABNORMAL
UROBILINOGEN UR QL STRIP.AUTO: 1 EU/DL (ref 0.2–1)
WBC URNS QL MICRO: ABNORMAL /HPF (ref 0–4)

## 2023-12-09 PROCEDURE — 6370000000 HC RX 637 (ALT 250 FOR IP): Performed by: INTERNAL MEDICINE

## 2023-12-09 PROCEDURE — 1100000003 HC PRIVATE W/ TELEMETRY

## 2023-12-09 PROCEDURE — 80074 ACUTE HEPATITIS PANEL: CPT

## 2023-12-09 PROCEDURE — 6370000000 HC RX 637 (ALT 250 FOR IP): Performed by: STUDENT IN AN ORGANIZED HEALTH CARE EDUCATION/TRAINING PROGRAM

## 2023-12-09 PROCEDURE — 2580000003 HC RX 258: Performed by: INTERNAL MEDICINE

## 2023-12-09 PROCEDURE — 81001 URINALYSIS AUTO W/SCOPE: CPT

## 2023-12-09 PROCEDURE — 36415 COLL VENOUS BLD VENIPUNCTURE: CPT

## 2023-12-09 PROCEDURE — 80053 COMPREHEN METABOLIC PANEL: CPT

## 2023-12-09 PROCEDURE — 6360000002 HC RX W HCPCS: Performed by: INTERNAL MEDICINE

## 2023-12-09 PROCEDURE — 99232 SBSQ HOSP IP/OBS MODERATE 35: CPT | Performed by: FAMILY MEDICINE

## 2023-12-09 RX ADMIN — RIVAROXABAN 20 MG: 20 TABLET, FILM COATED ORAL at 09:24

## 2023-12-09 RX ADMIN — SODIUM CHLORIDE: 9 INJECTION, SOLUTION INTRAVENOUS at 09:28

## 2023-12-09 RX ADMIN — MEROPENEM 1000 MG: 1 INJECTION, POWDER, FOR SOLUTION INTRAVENOUS at 16:40

## 2023-12-09 RX ADMIN — MEROPENEM 1000 MG: 1 INJECTION, POWDER, FOR SOLUTION INTRAVENOUS at 09:29

## 2023-12-09 RX ADMIN — Medication: at 19:50

## 2023-12-09 RX ADMIN — PAROXETINE HYDROCHLORIDE 10 MG: 20 TABLET, FILM COATED ORAL at 09:24

## 2023-12-09 RX ADMIN — SODIUM CHLORIDE, PRESERVATIVE FREE 10 ML: 5 INJECTION INTRAVENOUS at 19:50

## 2023-12-09 RX ADMIN — MIDODRINE HYDROCHLORIDE 5 MG: 5 TABLET ORAL at 09:24

## 2023-12-09 RX ADMIN — AMIODARONE HYDROCHLORIDE 200 MG: 200 TABLET ORAL at 09:24

## 2023-12-09 RX ADMIN — Medication: at 09:25

## 2023-12-09 RX ADMIN — DONEPEZIL HYDROCHLORIDE 10 MG: 5 TABLET, FILM COATED ORAL at 19:49

## 2023-12-09 RX ADMIN — MIDODRINE HYDROCHLORIDE 5 MG: 5 TABLET ORAL at 12:45

## 2023-12-09 RX ADMIN — SODIUM CHLORIDE, PRESERVATIVE FREE 10 ML: 5 INJECTION INTRAVENOUS at 09:25

## 2023-12-09 RX ADMIN — POTASSIUM BICARBONATE 40 MEQ: 782 TABLET, EFFERVESCENT ORAL at 09:24

## 2023-12-09 RX ADMIN — MIDODRINE HYDROCHLORIDE 5 MG: 5 TABLET ORAL at 16:41

## 2023-12-09 NOTE — PROGRESS NOTES
Bedside and Verbal shift change report given to Philip Andersen RN  (oncoming nurse) by Melissa Benson RN (offgoing nurse). Report included the following information Nurse Handoff Report, Index, ED Encounter Summary, Intake/Output, MAR, Recent Results, and Cardiac Rhythm NSR .      1428: Purewick set changed and karen care provided to collect urine sample    1500: Labs drawn & sent    1750: Urine sample collected and sent     Bedside and Verbal shift change report given to Melissa Benson RN (oncoming nurse) by Philip Andersen RN  (offgoing nurse). Report included the following information Nurse Handoff Report, Index, ED Encounter Summary, Intake/Output, MAR, Recent Results, and Cardiac Rhythm Sinus ella .

## 2023-12-09 NOTE — PROGRESS NOTES
James Score 14. All of the following interventions have been implemented to prevent pressure injury:    SKIN ASSESSMENT (S)  Dual skin assessment completed at shift change: later in shift  Name of second RN who completed Dual Skin Assessment: Yanni Cobb Rn  Picture of wound uploaded to EMR: Yes  Venelex ordered and given per protocol: Yes  Wound care consulted if wounds present: Yes    SURFACE (S)  [place] air pump or specialty bed ordered: Yes  Type of bed: rola with pump  Only white chux used with specialty surfaces (no green chux used): Yes  Waffle cushion used for chair positioning: NA    KEEP MOVING (K)  Mobility status (Bedrest, Chairbound, UWA x 1 assist , 2 assist, Max assist): 2 assist  Q2 hour turns documented: Yes  Refusals to turn and education provided documented: NA  Device used to float heels: heels up  PT/OT consulted: Yes    INCONTINENCE (I)  Incontinence status assessed Q2 hours: Yes  External catheter in use: yes  Barrier cream in use: yes    NUTRITION (N)  I/O's documented every 8 hours: Yes  Oral supplements ordered if appropriate: Yes  Nutrition services consulted: No    All concerns about new DTI's must be escalated directly to attending MD, charge nurse, [address] [place] [address] and nurse director.

## 2023-12-09 NOTE — PLAN OF CARE
Problem: Discharge Planning  Goal: Discharge to home or other facility with appropriate resources  Outcome: Progressing     Problem: Pain  Goal: Verbalizes/displays adequate comfort level or baseline comfort level  Outcome: Progressing  Flowsheets  Taken 12/9/2023 1500  Verbalizes/displays adequate comfort level or baseline comfort level: Encourage patient to monitor pain and request assistance  Taken 12/9/2023 1145  Verbalizes/displays adequate comfort level or baseline comfort level: Encourage patient to monitor pain and request assistance     Problem: Safety - Adult  Goal: Free from fall injury  Outcome: Progressing

## 2023-12-09 NOTE — PROGRESS NOTES
EP/ ARRHYTHMIA/ CARDIOLOGY Progress Note    Patient ID:  Patient: Jocelyn Kimble  MRN: [id number]  Age: 68 y.o.  : 1950    Date of  Admission: 2023 12:04 PM   PCP:  Eliana Pearson MD  Usual arrhythmia specialists:  Shobha Fonseca MD    Assessment:   Fainting likely due to orthostatic hypotension, confounded by her UTI. UTI with Klebsiella pnuemoniae. Atrial fibrillation with prior ablation. Was on propafenone prior, now on amiodarone. Elevated transaminases, unsure of the cause. Hypotension can be the cause of hepatic hypoperfusion, but still sorting this out. Digoxin level undetectable despite this as an OP medication. Hypokalemia. Chronic lymphedema. Full code. Plan:     Supportive care with hydration, antibiotics, K supplement. Continue anticoagulation with Xarelto. Agree with reducing her amiodarone to 200 mg daily. Agree with holding atorvastatin. Transaminases improving. Was going to add back digoxin but will just stay off this. Continue midodrine. Urine is brown. Will get U/A to see if this is blood. [x]       High complexity decision making was performed in this patient at high risk for decompensation with multiple organ involvement. Jocelyn Kimble is a 68 y.o. female with a history of admission for symptomatic UTI. She denies CP, syncope, dizziness, palpitations, orthopnea, PND, or edema. NO TIA or stroke like symptoms on my interview today. She feels weak. Better than on admission, but not at baseline. Denies symptoms of UTI such as dysuria, frequency, urgency. Per the hospitalist H&P:  Ivan Gomez is a 68 y.o. female with a history of Afib, HTN, depression and dementia who was brought in by EMS for AMS and weakness. Patient lives at home alone but her daughter checks in on her daily, either in person or by camera / phone.   Yesterday, the daughter checked the home cameras and noted that the patient went to bed early at regurgitation. No stenosis. Mitral Valve Valve structure is normal. Mild regurgitation. No stenosis noted. Tricuspid Valve Valve structure is normal. Mild regurgitation. No stenosis noted. The estimated RVSP is 28 mmHg. Pulmonic Valve The pulmonic valve was not well visualized. Valve structure is normal. No regurgitation. No stenosis noted. Aorta Normal sized aortic root. Pericardium No pericardial effusion. Labs:  No results for input(s): \"CPK\", \"CKMB\" in the last 72 hours. Invalid input(s): \"CPKMB\", \"CKNDX\", \"TROIQ\"  Lab Results   Component Value Date/Time    CHOL 154 10/20/2022 12:04 PM    HDL 58 10/20/2022 12:04 PM     No results for input(s): \"INR\", \"APTT\" in the last 72 hours. Invalid input(s): \"PTP\"   Recent Labs     12/06/23  1351 12/07/23  0437 12/08/23  0427    142 144   K 3.6 3.5 3.9   * 117* 116*   CO2 20* 18* 21   BUN 32* 23* 17   WBC  --  9.2 10.4   HGB  --  9.5* 10.0*   HCT  --  29.2* 30.3*   PLT  --  157 159       Recent Labs     12/07/23  0437 12/08/23 0427   GLOB 2.6 2.8       No components found for: \"GLPOC\"  No results for input(s): \"PH\", \"PCO2\", \"PO2\" in the last 72 hours.       Roena Homans, MD 12/09/23 1:27 PM

## 2023-12-10 ENCOUNTER — APPOINTMENT (OUTPATIENT)
Facility: HOSPITAL | Age: 73
DRG: 871 | End: 2023-12-10
Payer: MEDICARE

## 2023-12-10 LAB
ALBUMIN SERPL-MCNC: 1.9 G/DL (ref 3.5–5)
ALBUMIN/GLOB SERPL: 0.6 (ref 1.1–2.2)
ALP SERPL-CCNC: 133 U/L (ref 45–117)
ALT SERPL-CCNC: 399 U/L (ref 12–78)
ANION GAP SERPL CALC-SCNC: 5 MMOL/L (ref 5–15)
AST SERPL-CCNC: 224 U/L (ref 15–37)
BACTERIA SPEC CULT: NORMAL
BACTERIA SPEC CULT: NORMAL
BILIRUB SERPL-MCNC: 1 MG/DL (ref 0.2–1)
BUN SERPL-MCNC: 10 MG/DL (ref 6–20)
BUN/CREAT SERPL: 24 (ref 12–20)
CALCIUM SERPL-MCNC: 8.5 MG/DL (ref 8.5–10.1)
CHLORIDE SERPL-SCNC: 112 MMOL/L (ref 97–108)
CO2 SERPL-SCNC: 21 MMOL/L (ref 21–32)
CREAT SERPL-MCNC: 0.42 MG/DL (ref 0.55–1.02)
GLOBULIN SER CALC-MCNC: 3 G/DL (ref 2–4)
GLUCOSE SERPL-MCNC: 94 MG/DL (ref 65–100)
NT PRO BNP: 1796 PG/ML
POTASSIUM SERPL-SCNC: 4.7 MMOL/L (ref 3.5–5.1)
PROT SERPL-MCNC: 4.9 G/DL (ref 6.4–8.2)
SERVICE CMNT-IMP: NORMAL
SERVICE CMNT-IMP: NORMAL
SODIUM SERPL-SCNC: 138 MMOL/L (ref 136–145)
TROPONIN I SERPL HS-MCNC: 8 NG/L (ref 0–51)

## 2023-12-10 PROCEDURE — 6370000000 HC RX 637 (ALT 250 FOR IP): Performed by: INTERNAL MEDICINE

## 2023-12-10 PROCEDURE — 71045 X-RAY EXAM CHEST 1 VIEW: CPT

## 2023-12-10 PROCEDURE — 6360000002 HC RX W HCPCS: Performed by: INTERNAL MEDICINE

## 2023-12-10 PROCEDURE — 1100000003 HC PRIVATE W/ TELEMETRY

## 2023-12-10 PROCEDURE — 36415 COLL VENOUS BLD VENIPUNCTURE: CPT

## 2023-12-10 PROCEDURE — 2580000003 HC RX 258: Performed by: INTERNAL MEDICINE

## 2023-12-10 PROCEDURE — 83880 ASSAY OF NATRIURETIC PEPTIDE: CPT

## 2023-12-10 PROCEDURE — 84484 ASSAY OF TROPONIN QUANT: CPT

## 2023-12-10 PROCEDURE — 2060000000 HC ICU INTERMEDIATE R&B

## 2023-12-10 PROCEDURE — 6370000000 HC RX 637 (ALT 250 FOR IP): Performed by: STUDENT IN AN ORGANIZED HEALTH CARE EDUCATION/TRAINING PROGRAM

## 2023-12-10 PROCEDURE — 99232 SBSQ HOSP IP/OBS MODERATE 35: CPT | Performed by: FAMILY MEDICINE

## 2023-12-10 PROCEDURE — 80053 COMPREHEN METABOLIC PANEL: CPT

## 2023-12-10 RX ORDER — FUROSEMIDE 10 MG/ML
40 INJECTION INTRAMUSCULAR; INTRAVENOUS ONCE
Status: COMPLETED | OUTPATIENT
Start: 2023-12-10 | End: 2023-12-10

## 2023-12-10 RX ADMIN — MIDODRINE HYDROCHLORIDE 5 MG: 5 TABLET ORAL at 12:38

## 2023-12-10 RX ADMIN — MEROPENEM 1000 MG: 1 INJECTION, POWDER, FOR SOLUTION INTRAVENOUS at 09:22

## 2023-12-10 RX ADMIN — SODIUM CHLORIDE, PRESERVATIVE FREE 10 ML: 5 INJECTION INTRAVENOUS at 09:25

## 2023-12-10 RX ADMIN — PAROXETINE HYDROCHLORIDE 10 MG: 20 TABLET, FILM COATED ORAL at 09:24

## 2023-12-10 RX ADMIN — Medication: at 09:24

## 2023-12-10 RX ADMIN — SODIUM CHLORIDE: 9 INJECTION, SOLUTION INTRAVENOUS at 05:38

## 2023-12-10 RX ADMIN — MIDODRINE HYDROCHLORIDE 5 MG: 5 TABLET ORAL at 16:37

## 2023-12-10 RX ADMIN — DONEPEZIL HYDROCHLORIDE 10 MG: 5 TABLET, FILM COATED ORAL at 20:47

## 2023-12-10 RX ADMIN — FUROSEMIDE 40 MG: 10 INJECTION, SOLUTION INTRAMUSCULAR; INTRAVENOUS at 20:46

## 2023-12-10 RX ADMIN — MEROPENEM 1000 MG: 1 INJECTION, POWDER, FOR SOLUTION INTRAVENOUS at 00:49

## 2023-12-10 RX ADMIN — AMIODARONE HYDROCHLORIDE 200 MG: 200 TABLET ORAL at 09:24

## 2023-12-10 RX ADMIN — RIVAROXABAN 20 MG: 20 TABLET, FILM COATED ORAL at 09:24

## 2023-12-10 RX ADMIN — ACETAMINOPHEN 650 MG: 325 TABLET ORAL at 12:38

## 2023-12-10 RX ADMIN — MIDODRINE HYDROCHLORIDE 5 MG: 5 TABLET ORAL at 09:24

## 2023-12-10 RX ADMIN — Medication: at 20:48

## 2023-12-10 RX ADMIN — ACETAMINOPHEN 650 MG: 325 TABLET ORAL at 00:50

## 2023-12-10 RX ADMIN — SODIUM CHLORIDE, PRESERVATIVE FREE 10 ML: 5 INJECTION INTRAVENOUS at 20:47

## 2023-12-10 RX ADMIN — POTASSIUM BICARBONATE 40 MEQ: 782 TABLET, EFFERVESCENT ORAL at 09:23

## 2023-12-10 RX ADMIN — MEROPENEM 1000 MG: 1 INJECTION, POWDER, FOR SOLUTION INTRAVENOUS at 16:32

## 2023-12-10 NOTE — PROGRESS NOTES
Bedside and Verbal shift change report given to Dorothy Smith RN  (oncoming nurse) by Escobar Rojas RN (offgoing nurse). Report included the following information Nurse Handoff Report, Index, Intake/Output, MAR, Recent Results, and Cardiac Rhythm NSR .      1123: Attempted to page Abi Pearson MD to notify of pts decreased urinary output as well as some concerns expressed by pts daughter. Unable to leave message with office at this time, will attempt to page again. 1255: Pt placed on 2 L NC for O2 sats 87-88% on RA. O2 increased to 98% on 2 L NC    1415: Abi Pearson MD paged. Unable to leave message d/t full mailbox. 1424: Pager # from Jaqui Walker MD note called- [phone]. Call service answered and transferred this RN to office number. Reached full voicemail box. 1500: Nursing supervisor contacted to get best number to reach MD.     2296: Spoke to Abi Pearson MD.  Notified him of pts decreased urine output, new O2 requirements, crackles heard on auscultation, and daughters concerns for increased weakness. CXR ordered, MD to call pts daughter. 1830: Spoke with Abi Pearson MD over phone. Orders placed to d/c fluids and draw BNP and Trop. 1935: Bedside and Verbal shift change report given to NESSA Perez (oncoming nurse) by Dorothy Smith RN  (offgoing nurse). Report included the following information Nurse Handoff Report, Index, ED Encounter Summary, Intake/Output, MAR, Recent Results, and Cardiac Rhythm NSR/Sinus ella .

## 2023-12-10 NOTE — PROGRESS NOTES
Spiritual Care Assessment/Progress Note  Mariano    Name: Suzzane Severe MRN: [id number]    Age: 68 y.o. Sex: female   Language: English     Date: 12/10/2023            Total Time Calculated: 12 min              Spiritual Assessment begun in [facility] 2 CARDIAC MEDICAL STEP DOWN        Encounter Overview/Reason : Attempted Encounter    Spiritual beliefs:      [] Involved in a attila tradition/spiritual practice:      [] Supported by a attila community:      [] Claims no spiritual orientation:      [] Seeking spiritual identity:           [] Adheres to an individual form of spirituality:      [x] Not able to assess:                Identified resources for coping and support system:           [] Prayer                  [] Devotional reading               [] Music                  [] Guided Imagery     [] Pet visits                                        [] Other: (COMMENT)     Specific area/focus of visit   Encounter:    Crisis:    Spiritual/Emotional needs:    Ritual, Rites and Sacraments:    Grief, Loss, and Adjustments:    Ethics/Mediation:    Behavioral Health:    Palliative Care: Advance Care Planning:      Plan/Referrals: Continue to visit, (comment)    Narrative:  Reviewed chart prior to visit on PCU for spiritual assessment. No family/friends present. Ms Dheeraj Orozco appeared to be resting comfortably. Unable to assess for spiritual needs or concerns at this time. Left pastoral care note for patient.    available upon referral by staff or by patient/family request.    ROMULO Jurado, [facility], Staff   [facility] Paging Service  [phone])

## 2023-12-10 NOTE — PROGRESS NOTES
EP/ ARRHYTHMIA/ CARDIOLOGY Progress Note    Patient ID:  Patient: Berry Guzmán  MRN: [id number]  Age: 68 y.o.  : 1950    Date of  Admission: 2023 12:04 PM   PCP:  Dinora Villatoro MD  Usual arrhythmia specialists:  Natalee Gtz MD    Assessment:   Fainting likely due to orthostatic hypotension, confounded by her UTI. UTI with Klebsiella pnuemoniae. Atrial fibrillation with prior ablation. Was on propafenone prior, now on amiodarone. Elevated transaminases, unsure of the cause. Hypotension can be the cause of hepatic hypoperfusion, but still sorting this out. Digoxin level undetectable despite this as an OP medication. Hypokalemia. Chronic lymphedema. Full code. Plan:     Supportive care with hydration, antibiotics, K supplement. Continue anticoagulation with Xarelto. Agree with reducing her amiodarone to 200 mg daily. Agree with holding atorvastatin. Transaminases improving. Was going to add back digoxin but will just stay off this. Continue midodrine. Nutrition team suggested considering an appetite stimulant. Will try furosemide 40 mg IV x 1 given the pulmonary edema on CHEST X-RAY today. [x]       High complexity decision making was performed in this patient at high risk for decompensation with multiple organ involvement. Berry Guzmán is a 68 y.o. female with a history of admission for symptomatic UTI. She denies CP, syncope, dizziness, palpitations, orthopnea, PND, or edema. NO TIA or stroke like symptoms on my interview today. She feels weak. Better than on admission, but not at baseline. Denies symptoms of UTI such as dysuria, frequency, urgency. Per the hospitalist H&P:  Abdias Thomas is a 68 y.o. female with a history of Afib, HTN, depression and dementia who was brought in by EMS for AMS and weakness. Patient lives at home alone but her daughter checks in on her daily, either in person or by camera / phone.   Yesterday, the

## 2023-12-10 NOTE — PROGRESS NOTES
James Score 16. All of the following interventions have been implemented to prevent pressure injury:    SKIN ASSESSMENT (S)  Dual skin assessment completed at shift change: Yes  Name of second RN who completed Dual Skin Assessment: Raman Valle Rn  Picture of wound uploaded to EMR: Yes  Venelex ordered and given per protocol: Yes  Wound care consulted if wounds present: Yes    SURFACE (S)  [place] air pump or specialty bed ordered: Yes  Type of bed: naseem with pump  Only white chux used with specialty surfaces (no green chux used): Yes  Waffle cushion used for chair positioning: NA    KEEP MOVING (K)  Mobility status (Bedrest, Chairbound, UWA x 1 assist , 2 assist, Max assist): max assist  Q2 hour turns documented: Yes  Refusals to turn and education provided documented: No  Device used to float heels: heels up  PT/OT consulted: Yes    INCONTINENCE (I)  Incontinence status assessed Q2 hours: Yes  External catheter in use: yes  Barrier cream in use: yes    NUTRITION (N)  I/O's documented every 8 hours: Yes  Oral supplements ordered if appropriate: Yes  Nutrition services consulted: Yes    All concerns about new DTI's must be escalated directly to attending MD, charge nurse, [address] [place] [address] and nurse director.

## 2023-12-11 LAB
ALBUMIN SERPL-MCNC: 1.7 G/DL (ref 3.5–5)
ALBUMIN/GLOB SERPL: 0.6 (ref 1.1–2.2)
ALP SERPL-CCNC: 127 U/L (ref 45–117)
ALT SERPL-CCNC: 301 U/L (ref 12–78)
ANION GAP SERPL CALC-SCNC: 3 MMOL/L (ref 5–15)
AST SERPL-CCNC: 161 U/L (ref 15–37)
BILIRUB SERPL-MCNC: 0.9 MG/DL (ref 0.2–1)
BUN SERPL-MCNC: 10 MG/DL (ref 6–20)
BUN/CREAT SERPL: 24 (ref 12–20)
CALCIUM SERPL-MCNC: 8.5 MG/DL (ref 8.5–10.1)
CHLORIDE SERPL-SCNC: 111 MMOL/L (ref 97–108)
CO2 SERPL-SCNC: 27 MMOL/L (ref 21–32)
CREAT SERPL-MCNC: 0.42 MG/DL (ref 0.55–1.02)
ERYTHROCYTE [DISTWIDTH] IN BLOOD BY AUTOMATED COUNT: 16.1 % (ref 11.5–14.5)
GLOBULIN SER CALC-MCNC: 2.8 G/DL (ref 2–4)
GLUCOSE BLD STRIP.AUTO-MCNC: 101 MG/DL (ref 65–117)
GLUCOSE SERPL-MCNC: 97 MG/DL (ref 65–100)
HCT VFR BLD AUTO: 30.8 % (ref 35–47)
HGB BLD-MCNC: 9.7 G/DL (ref 11.5–16)
MCH RBC QN AUTO: 31.1 PG (ref 26–34)
MCHC RBC AUTO-ENTMCNC: 31.5 G/DL (ref 30–36.5)
MCV RBC AUTO: 98.7 FL (ref 80–99)
NRBC # BLD: 0 K/UL (ref 0–0.01)
NRBC BLD-RTO: 0 PER 100 WBC
PLATELET # BLD AUTO: 131 K/UL (ref 150–400)
PMV BLD AUTO: 11.4 FL (ref 8.9–12.9)
POTASSIUM SERPL-SCNC: 4.1 MMOL/L (ref 3.5–5.1)
PROT SERPL-MCNC: 4.5 G/DL (ref 6.4–8.2)
RBC # BLD AUTO: 3.12 M/UL (ref 3.8–5.2)
SERVICE CMNT-IMP: NORMAL
SODIUM SERPL-SCNC: 141 MMOL/L (ref 136–145)
TROPONIN I SERPL HS-MCNC: 6 NG/L (ref 0–51)
WBC # BLD AUTO: 11.7 K/UL (ref 3.6–11)

## 2023-12-11 PROCEDURE — 2580000003 HC RX 258: Performed by: INTERNAL MEDICINE

## 2023-12-11 PROCEDURE — 84484 ASSAY OF TROPONIN QUANT: CPT

## 2023-12-11 PROCEDURE — 6360000002 HC RX W HCPCS: Performed by: INTERNAL MEDICINE

## 2023-12-11 PROCEDURE — 97530 THERAPEUTIC ACTIVITIES: CPT

## 2023-12-11 PROCEDURE — 99233 SBSQ HOSP IP/OBS HIGH 50: CPT | Performed by: INTERNAL MEDICINE

## 2023-12-11 PROCEDURE — 6370000000 HC RX 637 (ALT 250 FOR IP): Performed by: INTERNAL MEDICINE

## 2023-12-11 PROCEDURE — 2700000000 HC OXYGEN THERAPY PER DAY

## 2023-12-11 PROCEDURE — 6370000000 HC RX 637 (ALT 250 FOR IP): Performed by: STUDENT IN AN ORGANIZED HEALTH CARE EDUCATION/TRAINING PROGRAM

## 2023-12-11 PROCEDURE — 97535 SELF CARE MNGMENT TRAINING: CPT

## 2023-12-11 PROCEDURE — 1100000003 HC PRIVATE W/ TELEMETRY

## 2023-12-11 PROCEDURE — 82962 GLUCOSE BLOOD TEST: CPT

## 2023-12-11 PROCEDURE — 80053 COMPREHEN METABOLIC PANEL: CPT

## 2023-12-11 PROCEDURE — 36415 COLL VENOUS BLD VENIPUNCTURE: CPT

## 2023-12-11 PROCEDURE — 85027 COMPLETE CBC AUTOMATED: CPT

## 2023-12-11 RX ORDER — FUROSEMIDE 10 MG/ML
40 INJECTION INTRAMUSCULAR; INTRAVENOUS ONCE
Status: COMPLETED | OUTPATIENT
Start: 2023-12-11 | End: 2023-12-11

## 2023-12-11 RX ADMIN — Medication: at 09:26

## 2023-12-11 RX ADMIN — PAROXETINE HYDROCHLORIDE 10 MG: 20 TABLET, FILM COATED ORAL at 09:27

## 2023-12-11 RX ADMIN — MIDODRINE HYDROCHLORIDE 5 MG: 5 TABLET ORAL at 12:14

## 2023-12-11 RX ADMIN — FUROSEMIDE 40 MG: 10 INJECTION, SOLUTION INTRAMUSCULAR; INTRAVENOUS at 12:14

## 2023-12-11 RX ADMIN — POTASSIUM BICARBONATE 40 MEQ: 782 TABLET, EFFERVESCENT ORAL at 09:27

## 2023-12-11 RX ADMIN — MEROPENEM 1000 MG: 1 INJECTION, POWDER, FOR SOLUTION INTRAVENOUS at 23:33

## 2023-12-11 RX ADMIN — MIDODRINE HYDROCHLORIDE 5 MG: 5 TABLET ORAL at 09:27

## 2023-12-11 RX ADMIN — SODIUM CHLORIDE, PRESERVATIVE FREE 10 ML: 5 INJECTION INTRAVENOUS at 20:44

## 2023-12-11 RX ADMIN — SODIUM CHLORIDE, PRESERVATIVE FREE 10 ML: 5 INJECTION INTRAVENOUS at 09:27

## 2023-12-11 RX ADMIN — DONEPEZIL HYDROCHLORIDE 10 MG: 5 TABLET, FILM COATED ORAL at 20:43

## 2023-12-11 RX ADMIN — MIDODRINE HYDROCHLORIDE 5 MG: 5 TABLET ORAL at 16:56

## 2023-12-11 RX ADMIN — MEROPENEM 1000 MG: 1 INJECTION, POWDER, FOR SOLUTION INTRAVENOUS at 16:55

## 2023-12-11 RX ADMIN — Medication: at 20:43

## 2023-12-11 RX ADMIN — AMIODARONE HYDROCHLORIDE 200 MG: 200 TABLET ORAL at 09:27

## 2023-12-11 RX ADMIN — RIVAROXABAN 20 MG: 20 TABLET, FILM COATED ORAL at 09:27

## 2023-12-11 RX ADMIN — MEROPENEM 1000 MG: 1 INJECTION, POWDER, FOR SOLUTION INTRAVENOUS at 09:39

## 2023-12-11 RX ADMIN — MEROPENEM 1000 MG: 1 INJECTION, POWDER, FOR SOLUTION INTRAVENOUS at 00:07

## 2023-12-11 NOTE — CARE COORDINATION
Transition of Care Plan:     RUR: 19% (moderate RUR)  Prior Level of Functioning: Independent   Disposition: SNF - [facility]    Back up plan: [facility] (accepted)  If SNF or IPR: Date [facility] offered: [facility]  Date [address] received: 12/6 - no preference for [facility],[address], 12/8 [facility]  Accepting facility: [address] [place] [address]  Date authorization started with reference number: TBD  Date authorization received and expires: TBD  Follow up appointments: defer to SNF  DME needed: defer to SNF  Transportation at discharge: Family vs. Transport  IM/IMM Medicare/ letter given: 2nd IM needed prior to discharge. Is patient a [place] and connected with [facility]? No.              If yes, was [place] transfer form completed and VA notified? N/A  Caregiver Contact: Travis Jean - [phone]  Discharge Caregiver contacted prior to discharge? Patient to contact. Care Conference needed? No.  Barriers to discharge: updated PT/OT notes for auth, cards clearance    1145 - CM notified therapist of need for updated notes for this [facility], [facility]   [address] [place] [address] confirmed that they can be contacted to start auth once notes are in.      Lilly Ray, TYESHAN, RN    Care Management  [phone]

## 2023-12-11 NOTE — PROGRESS NOTES
James Score 16. All of the following interventions have been implemented to prevent pressure injury:    SKIN ASSESSMENT (S)  Dual skin assessment completed at shift change: Yes  Name of second RN who completed Dual Skin Assessment: Nancy SZYMANSKI  Picture of wound uploaded to EMR: Yes  Venelex ordered and given per protocol: Yes  Wound care consulted if wounds present: Yes    SURFACE (S)  [place] air pump or specialty bed ordered: Yes  Type of bed: [place]  Only white chux used with specialty surfaces (no green chux used): Yes  Waffle cushion used for chair positioning: NA    KEEP MOVING (K)  Mobility status (Bedrest, Chairbound, UWA x 1 assist , 2 assist, Max assist): max assist  Q2 hour turns documented: Yes  Refusals to turn and education provided documented: NA  Device used to float heels: Yes  PT/OT consulted: Yes    INCONTINENCE (I)  Incontinence status assessed Q2 hours: Yes  External catheter in use: Yes  Barrier cream in use: Yes    NUTRITION (N)  I/O's documented every 8 hours: Yes  Oral supplements ordered if appropriate: Yes  Nutrition services consulted: Yes    All concerns about new DTI's must be escalated directly to attending MD, charge nurse, [address] [place] [address] and nurse director.

## 2023-12-11 NOTE — PROGRESS NOTES
2340: Bedside and Verbal shift change report given to 74-03 [facility] (oncoming nurse) by [facility] RN (offgoing nurse). Report included the following information Nurse Handoff Report, MAR, Recent Results, and Cardiac Rhythm NSR . End of Shift Note    Bedside shift change report given to [address] [facility] [facility] (oncoming nurse) by Kerwin Bullock RN (offgoing nurse). Report included the following information SBAR, MAR, Recent Results, Med Rec Status, and Cardiac Rhythm NSR    Shift worked:  7-7     Shift summary and any significant changes:          Concerns for physician to address:      Zone phone for oncoming shift:          Activity:     Number times ambulated in hallways past shift: 0  Number of times OOB to chair past shift: 1    Cardiac:   Cardiac Monitoring: Yes           Access:  Current line(s): PIV     Genitourinary:   Urinary status: voiding and external catheter    Respiratory:      Chronic home O2 use?: NO  Incentive spirometer at bedside: NO       GI:     Current diet:  ADULT ORAL NUTRITION SUPPLEMENT; Lunch, Dinner; Frozen Oral Supplement  ADULT DIET; Regular  Passing flatus: YES  Tolerating current diet: YES       Pain Management:   Patient states pain is manageable on current regimen: YES    Skin:     Interventions: float heels    Patient Safety:  Fall Score:    Interventions: bed/chair alarm, assistive device (walker, cane.  etc), and gripper socks       Length of Stay:  Expected LOS: 8  Actual LOS: 550 Yrn Boykin RN

## 2023-12-11 NOTE — PLAN OF CARE
Problem: Discharge Planning  Goal: Discharge to home or other facility with appropriate resources  12/11/2023 0843 by Carlos A Mora RN  Outcome: Progressing  12/11/2023 0056 by Heike Clifton RN  Outcome: Progressing     Problem: Pain  Goal: Verbalizes/displays adequate comfort level or baseline comfort level  12/11/2023 0843 by Carlos A Mora RN  Outcome: Progressing  12/11/2023 0056 by Heike Clifton RN  Outcome: Progressing  Flowsheets  Taken 12/10/2023 1557 by Kristofer Leonard RN  Verbalizes/displays adequate comfort level or baseline comfort level: Encourage patient to monitor pain and request assistance  Taken 12/10/2023 1150 by Kristofer Leonard RN  Verbalizes/displays adequate comfort level or baseline comfort level: Encourage patient to monitor pain and request assistance     Problem: Safety - Adult  Goal: Free from fall injury  12/11/2023 0843 by Carlos A Mora RN  Outcome: Progressing  12/11/2023 0056 by Heike Clifton RN  Outcome: Progressing     Problem: Skin/Tissue Integrity  Goal: Absence of new skin breakdown  Description: 1. Monitor for areas of redness and/or skin breakdown  2. Assess vascular access sites hourly  3. Every 4-6 hours minimum:  Change oxygen saturation probe site  4. Every 4-6 hours:  If on nasal continuous positive airway pressure, respiratory therapy assess nares and determine need for appliance change or resting period.   12/11/2023 0843 by Carlos A Mora RN  Outcome: Progressing  12/11/2023 0056 by Heike Clifton RN  Outcome: Progressing

## 2023-12-11 NOTE — PROGRESS NOTES
Bedside and Verbal shift change report given to Ana SZYMANSKI (oncoming nurse) by Mita Padilla RN (offgoing nurse). Report included the following information Nurse Handoff Report, MAR, Recent Results, and Cardiac Rhythm sinus ella/ NSR .     2046: Administered one time dose of IV lasix from 1830 at day shift RN request. Cristofer Maciel in place    [phone]: Morning blood work drawn and sent to lab    End of Shift Note    Bedside shift change report given to [address] (oncoming nurse) by Eitan Fagan RN (offgoing nurse). Report included the following information SBAR, MAR, Recent Results, and Cardiac Rhythm sinus ella/ NSR    Shift worked:  [id number]     Shift summary and any significant changes:    See above     Concerns for physician to address:    Zone phone for oncoming shift:       Activity:     Number times ambulated in hallways past shift: 0  Number of times OOB to chair past shift: 0    Cardiac:   Cardiac Monitoring: Yes           Access:  Current line(s): PIV     Genitourinary:   Urinary status: voiding and external catheter    Respiratory:      Chronic home O2 use?: NO  Incentive spirometer at bedside: YES       GI:     Current diet:  ADULT ORAL NUTRITION SUPPLEMENT; Lunch, Dinner; Frozen Oral Supplement  ADULT DIET; Regular  Passing flatus: YES  Tolerating current diet: YES       Pain Management:   Patient states pain is manageable on current regimen: YES    Skin:     Interventions: turn team, specialty bed, float heels, increase time out of bed, foam dressing, PT/OT consult, limit briefs, internal/external urinary devices, and nutritional support    Patient Safety:  Fall Score:    Interventions: bed/chair alarm, assistive device (walker, cane.  etc), gripper socks, pt to call before getting OOB, stay with me (per policy), and gait belt       Length of Stay:  Expected LOS: 4  Actual LOS: Carole Madrid RN

## 2023-12-11 NOTE — PLAN OF CARE
Problem: Physical Therapy - Adult  Goal: By Discharge: Performs mobility at highest level of function for planned discharge setting. See evaluation for individualized goals. Description: FUNCTIONAL STATUS PRIOR TO ADMISSION: Patient was modified independent using a rolling walker for functional mobility. HOME SUPPORT PRIOR TO ADMISSION: The patient lived alone with supportive daughter to provide assistance. Physical Therapy Goals  Initiated 12/5/2023  1. Patient will move from supine to sit and sit to supine, scoot up and down, and roll side to side in bed with modified independence within 7 day(s). 2.  Patient will perform sit to stand with modified independence within 7 day(s). 3.  Patient will transfer from bed to chair and chair to bed with modified independence using the least restrictive device within 7 day(s). 4.  Patient will ambulate with modified independence for 50 feet with the least restrictive device within 7 day(s). 5.  Patient will ascend/descend 5 stairs with handrail(s) with supervision/set-up within 7 day(s). Outcome: Progressing   PHYSICAL THERAPY TREATMENT    Patient: Mabel Johansen (68 y.o. female)  Date: 12/11/2023  Diagnosis: Nausea [R11.0]  Transaminitis [R74.01]  SAMUEL (acute kidney injury) ([address]) [N17.9]  Acute cystitis without hematuria [N30.00]  Sepsis ([address]) [A41.9]  Severe sepsis ([address]) [A41.9, R65.20] Sepsis ([address])      Precautions: Fall Risk, Bed Alarm, General Precautions, Contact Precautions (ESBL)                    ASSESSMENT:  Patient continues to benefit from skilled PT services and is slowly progressing towards goals. Pt received for PT treatment supine in bed, agreeable to therapy. Pt on 2L of supplemental O2 throughout session. Pt performed rolling to R side requiring Max A x1 including tactile cues for placement onto bed rail. Pt performed supine<>sit at EOB requiring MaxAx1.  While sitting EOB pt required verbal cues for BUE placement for support,

## 2023-12-11 NOTE — PROGRESS NOTES
EP/ ARRHYTHMIA/ CARDIOLOGY Progress Note    Patient ID:  Patient: Vilma Douglas  MRN: [id number]  Age: 68 y.o.  : 1950    Date of  Admission: 2023 12:04 PM   PCP:  Britni Andrade MD  Usual arrhythmia specialists:  Shara Latham MD    Assessment:   Fainting likely due to orthostatic hypotension, confounded by her UTI. UTI with Klebsiella pnuemoniae. Atrial fibrillation with prior ablation. Was on propafenone prior, now on amiodarone. Elevated transaminases, unsure of the cause. Hypotension can be the cause of hepatic hypoperfusion, but still sorting this out. Digoxin level undetectable despite this as an OP medication. Hypokalemia, resolved. Fluid overload with interstitial pulmonary edema on CHEST X-RAY 12/10. Net IO Since Admission: 9,502. 62 mL [23 1149]    No data found. .  Chronic lymphedema. Full code. Plan:     Supportive care with hydration, antibiotics, K supplement. Continue anticoagulation with Xarelto. Agree with reducing her amiodarone to 200 mg daily. Agree with holding atorvastatin. Transaminases improving. Was going to add back digoxin but will just stay off this. Continue midodrine. Nutrition team suggested considering an appetite stimulant. Will try furosemide 40 mg IV x 1 again today based on exam.      [x]       High complexity decision making was performed in this patient    Vilma Douglas is a 68 y.o. female with a history of admission for symptomatic UTI. She denies CP, syncope, dizziness, palpitations, or edema. NO TIA or stroke like symptoms on my interview. She feels weak. +nonproductive cough. Per the hospitalist H&P:  Naga Cooper is a 68 y.o. female with a history of Afib, HTN, depression and dementia who was brought in by EMS for AMS and weakness. Patient lives at home alone but her daughter checks in on her daily, either in person or by camera / phone.   Yesterday, the daughter checked the home cameras

## 2023-12-12 PROCEDURE — 2580000003 HC RX 258: Performed by: INTERNAL MEDICINE

## 2023-12-12 PROCEDURE — 97110 THERAPEUTIC EXERCISES: CPT

## 2023-12-12 PROCEDURE — 6370000000 HC RX 637 (ALT 250 FOR IP): Performed by: INTERNAL MEDICINE

## 2023-12-12 PROCEDURE — 6360000002 HC RX W HCPCS: Performed by: INTERNAL MEDICINE

## 2023-12-12 PROCEDURE — 2700000000 HC OXYGEN THERAPY PER DAY

## 2023-12-12 PROCEDURE — 99233 SBSQ HOSP IP/OBS HIGH 50: CPT | Performed by: INTERNAL MEDICINE

## 2023-12-12 PROCEDURE — 6370000000 HC RX 637 (ALT 250 FOR IP): Performed by: STUDENT IN AN ORGANIZED HEALTH CARE EDUCATION/TRAINING PROGRAM

## 2023-12-12 PROCEDURE — 97530 THERAPEUTIC ACTIVITIES: CPT

## 2023-12-12 PROCEDURE — 97530 THERAPEUTIC ACTIVITIES: CPT | Performed by: OCCUPATIONAL THERAPIST

## 2023-12-12 PROCEDURE — 1100000003 HC PRIVATE W/ TELEMETRY

## 2023-12-12 PROCEDURE — 6360000002 HC RX W HCPCS: Performed by: NURSE PRACTITIONER

## 2023-12-12 RX ORDER — FUROSEMIDE 10 MG/ML
40 INJECTION INTRAMUSCULAR; INTRAVENOUS ONCE
Status: COMPLETED | OUTPATIENT
Start: 2023-12-12 | End: 2023-12-12

## 2023-12-12 RX ORDER — MEGESTROL ACETATE 40 MG/1
40 TABLET ORAL DAILY
Status: DISCONTINUED | OUTPATIENT
Start: 2023-12-13 | End: 2023-12-22

## 2023-12-12 RX ADMIN — MIDODRINE HYDROCHLORIDE 5 MG: 5 TABLET ORAL at 11:59

## 2023-12-12 RX ADMIN — Medication: at 21:05

## 2023-12-12 RX ADMIN — SODIUM CHLORIDE: 9 INJECTION, SOLUTION INTRAVENOUS at 16:14

## 2023-12-12 RX ADMIN — POTASSIUM BICARBONATE 40 MEQ: 782 TABLET, EFFERVESCENT ORAL at 09:33

## 2023-12-12 RX ADMIN — PAROXETINE HYDROCHLORIDE 10 MG: 20 TABLET, FILM COATED ORAL at 09:33

## 2023-12-12 RX ADMIN — MIDODRINE HYDROCHLORIDE 5 MG: 5 TABLET ORAL at 16:13

## 2023-12-12 RX ADMIN — MIDODRINE HYDROCHLORIDE 5 MG: 5 TABLET ORAL at 09:33

## 2023-12-12 RX ADMIN — AMIODARONE HYDROCHLORIDE 200 MG: 200 TABLET ORAL at 09:33

## 2023-12-12 RX ADMIN — DONEPEZIL HYDROCHLORIDE 10 MG: 5 TABLET, FILM COATED ORAL at 21:05

## 2023-12-12 RX ADMIN — MEROPENEM 1000 MG: 1 INJECTION, POWDER, FOR SOLUTION INTRAVENOUS at 09:34

## 2023-12-12 RX ADMIN — SODIUM CHLORIDE, PRESERVATIVE FREE 10 ML: 5 INJECTION INTRAVENOUS at 09:34

## 2023-12-12 RX ADMIN — MEROPENEM 1000 MG: 1 INJECTION, POWDER, FOR SOLUTION INTRAVENOUS at 16:15

## 2023-12-12 RX ADMIN — FUROSEMIDE 40 MG: 10 INJECTION, SOLUTION INTRAMUSCULAR; INTRAVENOUS at 09:59

## 2023-12-12 RX ADMIN — SODIUM CHLORIDE, PRESERVATIVE FREE 10 ML: 5 INJECTION INTRAVENOUS at 21:06

## 2023-12-12 RX ADMIN — Medication: at 09:33

## 2023-12-12 RX ADMIN — RIVAROXABAN 20 MG: 20 TABLET, FILM COATED ORAL at 09:33

## 2023-12-12 NOTE — PROGRESS NOTES
12/12/23 0941 12/12/23 0947 12/12/23 0950   Vital Signs   Pulse  --   --   --    BP (!) 128/55 (!) 77/41 (!) 75/43   MAP (Calculated) 79 53 54   BP Location Left upper arm Left upper arm Left upper arm   BP Method Automatic Automatic Automatic   Patient Position Supine Sitting Supine      12/12/23 0952 12/12/23 0955   Vital Signs   Pulse 64 65   /61 (!) 117/45   MAP (Calculated) 78 69   BP Location Left upper arm Left upper arm   BP Method Automatic Automatic   Patient Position Trendelenburg Rocco GalvinDes Mo[place], PT

## 2023-12-12 NOTE — PROGRESS NOTES
Comprehensive Nutrition Assessment    Type and Reason for Visit:  Reassess    Nutrition Recommendations/Plan:   Continue current diet and supplements  Encourage family to bring in alternative food that may interest pt better  Please document % meals and supplements consumed in flowsheet I/O's under intake      Malnutrition Assessment:  Malnutrition Status:  Severe malnutrition (12/06/23 1611)    Context:  Chronic Illness     Findings of the 6 clinical characteristics of malnutrition:  Energy Intake:  75% or less estimated energy requirements for 1 month or longer  Weight Loss:  Greater than 20% over 1 year     Body Fat Loss:  Severe body fat loss (Moderate) Orbital, Triceps   Muscle Mass Loss:  Severe muscle mass loss (Moderate) Temples (temporalis), Clavicles (pectoralis & deltoids), Hand (interosseous), Scapula (trapezius)  Fluid Accumulation:  Severe Extremities, Generalized   Strength:  Not Performed    Nutrition Assessment:     Chart reviewed. Pt seen in bed and case discussed with RN. Her PO intake remains very poor. She gets confused and thinks she has already eaten a meal when she has not yet consumed anything. She has tried a little of the Magic cups and seems to like them okay, but still isn't consuming much. Continue to encourage intake of meals and supplements. Pt not a candidate for nutrition support 2/2 dementia. Patient Vitals for the past 120 hrs:   PO Meals Eaten (%)   12/09/23 1925 1 - 25%   12/09/23 1145 51 - 75%   12/09/23 0928 1 - 25%   12/08/23 1724 1 - 25%   12/08/23 1200 0%   12/08/23 0859 1 - 25%     Wt Readings from Last 5 Encounters:   12/04/23 72.6 kg (160 lb)   11/02/23 74.6 kg (164 lb 6.4 oz)   08/16/23 85.3 kg (188 lb)   07/12/23 90.3 kg (199 lb)   06/14/23 88.7 kg (195 lb 9.6 oz)   ]    Nutrition Related Findings:    Labs: reviewed. Meds: merrem, effer-K. Edema: 2+ generalized, 2+ pitting/weeping BLE. BM 12/7.    Wound Type: Stage I (L heel, R heel), Stage II (coccyx),

## 2023-12-12 NOTE — PROGRESS NOTES
0710: Bedside and Verbal shift change report given to 74-03 [facility] Trina (oncoming nurse) by Boris Enamorado (offgoing nurse). Report included the following information Nurse Handoff Report, Adult Overview, MAR, Recent Results, and Cardiac Rhythm NSR .       1000: Pt's daughter Balaji Carlin was at bedside expressing concern over her mother not having any will to live and still being A FULL code. Pt. Is currently A/O x 3/4. Balaji Carlin is patients medical POA. Balaji Carlin requested spiritual consult to be placed for her mother. End of Shift Note    Bedside shift change report given to Pineda Ortiz (oncoming nurse) by Tammy Smith RN (offgoing nurse). Report included the following information SBAR, MAR, Recent Results, and Cardiac Rhythm NSR    Shift worked:  7-7     Shift summary and any significant changes:          Concerns for physician to address:       Zone phone for oncoming shift:          Activity:     Number times ambulated in hallways past shift: 0  Number of times OOB to chair past shift: 0    Cardiac:   Cardiac Monitoring: Yes           Access:  Current line(s): PIV     Genitourinary:   Urinary status: voiding and external catheter    Respiratory:      Chronic home O2 use?: NO  Incentive spirometer at bedside: YES       GI:     Current diet:  ADULT ORAL NUTRITION SUPPLEMENT; Lunch, Dinner; Frozen Oral Supplement  ADULT DIET; Regular  Passing flatus: YES  Tolerating current diet: YES       Pain Management:   Patient states pain is manageable on current regimen: YES    Skin:     Interventions: float heels, PT/OT consult, and internal/external urinary devices    Patient Safety:  Fall Score:    Interventions: bed/chair alarm, assistive device (walker, cane.  etc), and gripper socks       Length of Stay:  Expected LOS: 9  Actual LOS: NESSA Alegre

## 2023-12-12 NOTE — CARE COORDINATION
8619 -  sent message to [facility] via Link to begin auth and CM contacted facility;  unable to find admissions, CM left message with them to call back and start auth for patient. [facility] with [facility] admissions contacted [facility] and was notified to begin authorization for patient.       August Mon, TYESHAN, RN    Care Management  [phone]

## 2023-12-12 NOTE — PLAN OF CARE
Problem: Physical Therapy - Adult  Goal: By Discharge: Performs mobility at highest level of function for planned discharge setting. See evaluation for individualized goals. Description: FUNCTIONAL STATUS PRIOR TO ADMISSION: Patient was modified independent using a rolling walker for functional mobility. HOME SUPPORT PRIOR TO ADMISSION: The patient lived alone with supportive daughter to provide assistance. Physical Therapy Goals  Initiated 12/5/2023  1. Patient will move from supine to sit and sit to supine, scoot up and down, and roll side to side in bed with modified independence within 7 day(s). 2.  Patient will perform sit to stand with modified independence within 7 day(s). 3.  Patient will transfer from bed to chair and chair to bed with modified independence using the least restrictive device within 7 day(s). 4.  Patient will ambulate with modified independence for 50 feet with the least restrictive device within 7 day(s). 5.  Patient will ascend/descend 5 stairs with handrail(s) with supervision/set-up within 7 day(s). Physical Therapy Goals  Revised 12/12/2023  1. Patient will move from supine to sit and sit to supine , scoot up and down, and roll side to side in bed with minimal assistance within 7 day(s). 2.  Patient will transfer from bed to chair and chair to bed with moderate assistance  using the least restrictive device within 7 day(s). 3.  Patient will perform sit to stand with moderate assistance  within 7 day(s). 4.  Patient will ambulate with moderate assistance  for 6 feet with the least restrictive device within 7 day(s).       Outcome: Not Progressing   PHYSICAL THERAPY TREATMENT: WEEKLY REASSESSMENT    Patient: Jerry Mata (40 y.o. female)  Date: 12/12/2023  Primary Diagnosis: Nausea [R11.0]  Transaminitis [R74.01]  SAMUEL (acute kidney injury) ([address]) [N17.9]  Acute cystitis without hematuria [N30.00]  Sepsis ([address]) [A41.9]  Severe sepsis ([address]) [A41.9, R65.20]

## 2023-12-12 NOTE — PROGRESS NOTES
EP/ ARRHYTHMIA/ CARDIOLOGY Progress Note    Patient ID:  Patient: Radha Hagan  MRN: [id number]  Age: 68 y.o.  : 1950    Date of  Admission: 2023 12:04 PM   PCP:  Baron Heavenly MD  Usual arrhythmia specialists:  Vilma Morton MD    Assessment:   Fainting likely due to orthostatic hypotension, confounded by her UTI. UTI with Klebsiella pnuemoniae. Treating. Atrial fibrillation with prior ablation. Was on propafenone prior, now on amiodarone. Elevated transaminases, unsure of the cause. Hypotension can be the cause of hepatic hypoperfusion, but still sorting this out. Digoxin level undetectable despite this as an OP medication. Hypokalemia, resolved. Fluid overload with interstitial pulmonary edema on CHEST X-RAY 12/10. Net IO Since Admission: 7,652. 62 mL [23 1704]    No data found. .  Chronic lymphedema. Hypoalbuminemia possibly due to poor nutrition. Anemia, probably multifactorial.   Full code. Plan:     Supportive care. Continue anticoagulation with Xarelto. Continue amiodarone 200 mg daily. Agree with holding atorvastatin. Transaminases improving daily. No digoxin. Little benefit, high risk. Continue midodrine 5 mg po THREE TIMES A DAY. Nutrition team suggested considering an appetite stimulant. I will start megestrol 40 mg daily tomorrow. Lasix 40 IV again today. Negative 900 cc yesterday. Try to have her sitting up more to clear secretions. Lungs are congested. [x]       High complexity decision making was performed in this patient    Radha Hagan is a 68 y.o. female with a history of admission for symptomatic UTI. She denies CP, syncope, dizziness, palpitations, or edema. NO TIA or stroke like symptoms on my interview. She feels weak. +nonproductive cough. Per the hospitalist H&P:  Shai Alba is a 68 y.o. female with a history of Afib, HTN, depression and dementia who was brought in by EMS for AMS and weakness.

## 2023-12-12 NOTE — PLAN OF CARE
Problem: Physical Therapy - Adult  Goal: By Discharge: Performs mobility at highest level of function for planned discharge setting. See evaluation for individualized goals. Description: FUNCTIONAL STATUS PRIOR TO ADMISSION: Patient was modified independent using a rolling walker for functional mobility. HOME SUPPORT PRIOR TO ADMISSION: The patient lived alone with supportive daughter to provide assistance. Physical Therapy Goals  Initiated 12/5/2023  1. Patient will move from supine to sit and sit to supine, scoot up and down, and roll side to side in bed with modified independence within 7 day(s). 2.  Patient will perform sit to stand with modified independence within 7 day(s). 3.  Patient will transfer from bed to chair and chair to bed with modified independence using the least restrictive device within 7 day(s). 4.  Patient will ambulate with modified independence for 50 feet with the least restrictive device within 7 day(s). 5.  Patient will ascend/descend 5 stairs with handrail(s) with supervision/set-up within 7 day(s). Physical Therapy Goals  Revised 12/12/2023  1. Patient will move from supine to sit and sit to supine , scoot up and down, and roll side to side in bed with minimal assistance within 7 day(s). 2.  Patient will transfer from bed to chair and chair to bed with moderate assistance  using the least restrictive device within 7 day(s). 3.  Patient will perform sit to stand with moderate assistance  within 7 day(s). 4.  Patient will ambulate with moderate assistance  for 6 feet with the least restrictive device within 7 day(s). 12/12/2023 1029 by Justin Ayala, PT  Outcome: Not Progressing     Problem: Occupational Therapy - Adult  Goal: By Discharge: Performs self-care activities at highest level of function for planned discharge setting. See evaluation for individualized goals.   Description: FUNCTIONAL STATUS PRIOR TO ADMISSION:  Lives alone in an apartment and was

## 2023-12-13 LAB
ALBUMIN SERPL-MCNC: 1.5 G/DL (ref 3.5–5)
ALBUMIN/GLOB SERPL: 0.5 (ref 1.1–2.2)
ALP SERPL-CCNC: 119 U/L (ref 45–117)
ALT SERPL-CCNC: 189 U/L (ref 12–78)
ANION GAP SERPL CALC-SCNC: 3 MMOL/L (ref 5–15)
AST SERPL-CCNC: 176 U/L (ref 15–37)
BASOPHILS # BLD: 0 K/UL (ref 0–0.1)
BASOPHILS NFR BLD: 0 % (ref 0–1)
BILIRUB SERPL-MCNC: 0.7 MG/DL (ref 0.2–1)
BUN SERPL-MCNC: 13 MG/DL (ref 6–20)
BUN/CREAT SERPL: 33 (ref 12–20)
CALCIUM SERPL-MCNC: 8.5 MG/DL (ref 8.5–10.1)
CHLORIDE SERPL-SCNC: 105 MMOL/L (ref 97–108)
CO2 SERPL-SCNC: 32 MMOL/L (ref 21–32)
CREAT SERPL-MCNC: 0.4 MG/DL (ref 0.55–1.02)
DIFFERENTIAL METHOD BLD: ABNORMAL
EOSINOPHIL # BLD: 0.3 K/UL (ref 0–0.4)
EOSINOPHIL NFR BLD: 5 % (ref 0–7)
ERYTHROCYTE [DISTWIDTH] IN BLOOD BY AUTOMATED COUNT: 15.6 % (ref 11.5–14.5)
GLOBULIN SER CALC-MCNC: 2.9 G/DL (ref 2–4)
GLUCOSE SERPL-MCNC: 86 MG/DL (ref 65–100)
HCT VFR BLD AUTO: 30.3 % (ref 35–47)
HGB BLD-MCNC: 9.9 G/DL (ref 11.5–16)
IMM GRANULOCYTES # BLD AUTO: 0 K/UL (ref 0–0.04)
IMM GRANULOCYTES NFR BLD AUTO: 1 % (ref 0–0.5)
LYMPHOCYTES # BLD: 0.9 K/UL (ref 0.8–3.5)
LYMPHOCYTES NFR BLD: 14 % (ref 12–49)
MCH RBC QN AUTO: 31.8 PG (ref 26–34)
MCHC RBC AUTO-ENTMCNC: 32.7 G/DL (ref 30–36.5)
MCV RBC AUTO: 97.4 FL (ref 80–99)
MONOCYTES # BLD: 0.9 K/UL (ref 0–1)
MONOCYTES NFR BLD: 13 % (ref 5–13)
NEUTS SEG # BLD: 4.5 K/UL (ref 1.8–8)
NEUTS SEG NFR BLD: 67 % (ref 32–75)
NRBC # BLD: 0 K/UL (ref 0–0.01)
NRBC BLD-RTO: 0 PER 100 WBC
PLATELET # BLD AUTO: 123 K/UL (ref 150–400)
PMV BLD AUTO: 11.1 FL (ref 8.9–12.9)
POTASSIUM SERPL-SCNC: 3.6 MMOL/L (ref 3.5–5.1)
PROT SERPL-MCNC: 4.4 G/DL (ref 6.4–8.2)
RBC # BLD AUTO: 3.11 M/UL (ref 3.8–5.2)
SODIUM SERPL-SCNC: 140 MMOL/L (ref 136–145)
WBC # BLD AUTO: 6.7 K/UL (ref 3.6–11)

## 2023-12-13 PROCEDURE — 97530 THERAPEUTIC ACTIVITIES: CPT

## 2023-12-13 PROCEDURE — 6370000000 HC RX 637 (ALT 250 FOR IP): Performed by: INTERNAL MEDICINE

## 2023-12-13 PROCEDURE — 6360000002 HC RX W HCPCS: Performed by: INTERNAL MEDICINE

## 2023-12-13 PROCEDURE — 2580000003 HC RX 258: Performed by: INTERNAL MEDICINE

## 2023-12-13 PROCEDURE — 85025 COMPLETE CBC W/AUTO DIFF WBC: CPT

## 2023-12-13 PROCEDURE — 1100000003 HC PRIVATE W/ TELEMETRY

## 2023-12-13 PROCEDURE — 80053 COMPREHEN METABOLIC PANEL: CPT

## 2023-12-13 PROCEDURE — 6370000000 HC RX 637 (ALT 250 FOR IP): Performed by: STUDENT IN AN ORGANIZED HEALTH CARE EDUCATION/TRAINING PROGRAM

## 2023-12-13 PROCEDURE — 36415 COLL VENOUS BLD VENIPUNCTURE: CPT

## 2023-12-13 PROCEDURE — 99233 SBSQ HOSP IP/OBS HIGH 50: CPT | Performed by: INTERNAL MEDICINE

## 2023-12-13 RX ORDER — FUROSEMIDE 10 MG/ML
40 INJECTION INTRAMUSCULAR; INTRAVENOUS DAILY
Status: COMPLETED | OUTPATIENT
Start: 2023-12-13 | End: 2023-12-15

## 2023-12-13 RX ADMIN — POTASSIUM BICARBONATE 40 MEQ: 782 TABLET, EFFERVESCENT ORAL at 08:34

## 2023-12-13 RX ADMIN — MIDODRINE HYDROCHLORIDE 5 MG: 5 TABLET ORAL at 10:42

## 2023-12-13 RX ADMIN — POLYETHYLENE GLYCOL 3350 17 G: 17 POWDER, FOR SOLUTION ORAL at 14:03

## 2023-12-13 RX ADMIN — Medication: at 21:18

## 2023-12-13 RX ADMIN — MIDODRINE HYDROCHLORIDE 5 MG: 5 TABLET ORAL at 08:35

## 2023-12-13 RX ADMIN — MIDODRINE HYDROCHLORIDE 5 MG: 5 TABLET ORAL at 16:32

## 2023-12-13 RX ADMIN — FUROSEMIDE 40 MG: 10 INJECTION, SOLUTION INTRAMUSCULAR; INTRAVENOUS at 10:41

## 2023-12-13 RX ADMIN — AMIODARONE HYDROCHLORIDE 200 MG: 200 TABLET ORAL at 08:35

## 2023-12-13 RX ADMIN — RIVAROXABAN 20 MG: 20 TABLET, FILM COATED ORAL at 08:35

## 2023-12-13 RX ADMIN — SODIUM CHLORIDE, PRESERVATIVE FREE 10 ML: 5 INJECTION INTRAVENOUS at 21:19

## 2023-12-13 RX ADMIN — PAROXETINE HYDROCHLORIDE 10 MG: 20 TABLET, FILM COATED ORAL at 08:34

## 2023-12-13 RX ADMIN — DONEPEZIL HYDROCHLORIDE 10 MG: 5 TABLET, FILM COATED ORAL at 21:19

## 2023-12-13 RX ADMIN — SODIUM CHLORIDE, PRESERVATIVE FREE 10 ML: 5 INJECTION INTRAVENOUS at 08:36

## 2023-12-13 RX ADMIN — Medication: at 08:36

## 2023-12-13 RX ADMIN — MEGESTROL ACETATE 40 MG: 40 TABLET ORAL at 08:45

## 2023-12-13 NOTE — PLAN OF CARE
Problem: Physical Therapy - Adult  Goal: By Discharge: Performs mobility at highest level of function for planned discharge setting. See evaluation for individualized goals. Description: FUNCTIONAL STATUS PRIOR TO ADMISSION: Patient was modified independent using a rolling walker for functional mobility. HOME SUPPORT PRIOR TO ADMISSION: The patient lived alone with supportive daughter to provide assistance. Physical Therapy Goals  Initiated 12/5/2023  1. Patient will move from supine to sit and sit to supine, scoot up and down, and roll side to side in bed with modified independence within 7 day(s). 2.  Patient will perform sit to stand with modified independence within 7 day(s). 3.  Patient will transfer from bed to chair and chair to bed with modified independence using the least restrictive device within 7 day(s). 4.  Patient will ambulate with modified independence for 50 feet with the least restrictive device within 7 day(s). 5.  Patient will ascend/descend 5 stairs with handrail(s) with supervision/set-up within 7 day(s). Physical Therapy Goals  Revised 12/12/2023  1. Patient will move from supine to sit and sit to supine , scoot up and down, and roll side to side in bed with minimal assistance within 7 day(s). 2.  Patient will transfer from bed to chair and chair to bed with moderate assistance  using the least restrictive device within 7 day(s). 3.  Patient will perform sit to stand with moderate assistance  within 7 day(s). 4.  Patient will ambulate with moderate assistance  for 6 feet with the least restrictive device within 7 day(s).       Outcome: Not Progressing   PHYSICAL THERAPY TREATMENT    Patient: Vilma Douglas (42 y.o. female)  Date: 12/13/2023  Diagnosis: Nausea [R11.0]  Transaminitis [R74.01]  SAMUEL (acute kidney injury) ([address]) [N17.9]  Acute cystitis without hematuria [N30.00]  Sepsis ([address]) [A41.9]  Severe sepsis ([address]) [A41.9, R65.20] Sepsis ([address])      Precautions: Fall assistance;Assist X2  Transfers:     Balance:  Balance  Sitting: Impaired  Sitting - Static: Fair (occasional)  Sitting - Dynamic: Fair (occasional); Poor (constant support)  Standing:  (did not occur)   Ambulation/Gait Training:        Neuro Re-Education:            Pain Rating:  Did not report pain    Activity Tolerance:   Poor and signs and symptoms of orthostatic hypotension    After treatment:   Patient left in no apparent distress in bed, Call bell within reach, Bed/ chair alarm activated, and Heels elevated for pressure relief      COMMUNICATION/EDUCATION:   The patient's plan of care was discussed with: occupational therapist and registered nurse    Patient Education  Education Given To: Patient  Education Provided: Role of Therapy  Education Method: Verbal  Barriers to Learning: None  Education Outcome: Verbalized understanding      Pleasant Betito PT, DPT  Minutes: 58 - - -

## 2023-12-13 NOTE — PROGRESS NOTES
End of Shift Note    Bedside shift change report given to Kay Rouse (oncoming nurse) by Sallie Scott RN (offgoing nurse). Report included the following information SBAR, Kardex, Intake/Output, MAR, Recent Results, and Cardiac Rhythm SR    Shift worked:  days     Shift summary and any significant changes:     Not eating well , very sleepy     Concerns for physician to address:       Zone phone for oncoming shift:          Activity:     Number times ambulated in hallways past shift: 0  Number of times OOB to chair past shift: 0    Cardiac:   Cardiac Monitoring: Yes           Access:  Current line(s): PIV     Genitourinary:   Urinary status: external catheter    Respiratory:      Chronic home O2 use?: NO  Incentive spirometer at bedside: YES       GI:     Current diet:  ADULT ORAL NUTRITION SUPPLEMENT; Lunch, Dinner; Frozen Oral Supplement  ADULT DIET; Regular  Passing flatus: YES  Tolerating current diet: NO       Pain Management:   Patient states pain is manageable on current regimen: YES    Skin:     Interventions: float heels,, turn      Patient Safety:  Fall Score:    Interventions: bed/chair alarm, assistive device (walker, cane.  etc), gripper socks, pt to call before getting OOB, stay with me (per policy), and gait belt       Length of Stay:  Expected LOS: 10  Actual LOS: 9      Sallie Scott RN

## 2023-12-13 NOTE — CARE COORDINATION
80 - CM spoke with [address] [place] [address] [place] who confirmed authorization was started yesterday; CM requested any available reference numbers. 1538 - CM attempted to contact patient's DTR, Em Otero, unable to reach anyone, VM left.       Nelida Escudero, BSN, RN    Care Management  [phone]

## 2023-12-13 NOTE — PLAN OF CARE
Problem: Discharge Planning  Goal: Discharge to home or other facility with appropriate resources  Outcome: Progressing  Flowsheets (Taken 12/13/2023 0729)  Discharge to home or other facility with appropriate resources: Identify barriers to discharge with patient and caregiver     Problem: Pain  Goal: Verbalizes/displays adequate comfort level or baseline comfort level  Outcome: Progressing  Flowsheets (Taken 12/13/2023 0728)  Verbalizes/displays adequate comfort level or baseline comfort level: Encourage patient to monitor pain and request assistance     Problem: Safety - Adult  Goal: Free from fall injury  Outcome: Progressing     Problem: Skin/Tissue Integrity  Goal: Absence of new skin breakdown  Description: 1. Monitor for areas of redness and/or skin breakdown  2. Assess vascular access sites hourly  3. Every 4-6 hours minimum:  Change oxygen saturation probe site  4. Every 4-6 hours:  If on nasal continuous positive airway pressure, respiratory therapy assess nares and determine need for appliance change or resting period.   Outcome: Progressing     Problem: Metabolic/Fluid and Electrolytes - Adult  Goal: Electrolytes maintained within normal limits  Outcome: Progressing  Flowsheets (Taken 12/13/2023 0729)  Electrolytes maintained within normal limits:   Monitor labs and assess patient for signs and symptoms of electrolyte imbalances   Administer electrolyte replacement as ordered   Monitor response to electrolyte replacements, including repeat lab results as appropriate  Goal: Hemodynamic stability and optimal renal function maintained  Outcome: Progressing  Flowsheets (Taken 12/13/2023 0729)  Hemodynamic stability and optimal renal function maintained:   Monitor labs and assess for signs and symptoms of volume excess or deficit   Monitor intake, output and patient weight   Monitor urine specific gravity, serum osmolarity and serum sodium as indicated or ordered     Problem: Hematologic - Adult  Goal:

## 2023-12-13 NOTE — PROGRESS NOTES
4 Eyes Skin Assessment     NAME:  Clyde Sawyer  YOB: 1950  MEDICAL RECORD NUMBER:  [id number]    The patient is being assessed for  Other sacrum wound    I agree that at least one RN has performed a thorough Head to Toe Skin Assessment on the patient. ALL assessment sites listed below have been assessed. Areas assessed by both nurses:    Sacrum. Buttock, Coccyx, Ischium        Does the Patient have a Wound? Yes wound(s) were present on assessment.  LDA wound assessment was Initiated and completed by RN       James Prevention initiated by RN: Yes  Wound Care Orders initiated by RN: Yes    Pressure Injury (Stage 3,4, Unstageable, DTI, NWPT, and Complex wounds) if present, place Wound referral order by RN under : Yes    New Ostomies, if present place, Ostomy referral order under : No     Nurse 1 eSignature: Electronically signed by Margy Marcos RN on 12/13/23 at 3:34 PM EST    **SHARE this note so that the co-signing nurse can place an eSignature**    Nurse 2 eSignature: {Esignature:[id number]}

## 2023-12-13 NOTE — PROGRESS NOTES
Spiritual Care Assessment/Progress Note  Mariano    Name: Chapis Simmons MRN: [id number]    Age: 68 y.o. Sex: female   Language: English     Date: 12/13/2023            Total Time Calculated: 9 min              Spiritual Assessment begun in [facility] 2 [address] DOWN  Service Provided For[de-identified] Patient not available  Referral/Consult From[de-identified] Rounding  Encounter Overview/Reason : Attempted Encounter    Spiritual beliefs:      [] Involved in a attila tradition/spiritual practice:      [] Supported by a attila community:      [] Claims no spiritual orientation:      [] Seeking spiritual identity:           [] Adheres to an individual form of spirituality:      [x] Not able to assess:                Identified resources for coping and support system:   Support System: Unknown       [] Prayer                  [] Devotional reading               [] Music                  [] Guided Imagery     [] Pet visits                                        [] Other: (COMMENT)     Specific area/focus of visit   Encounter:    Crisis:    Spiritual/Emotional needs:    Ritual, Rites and Sacraments:    Grief, Loss, and Adjustments:    Ethics/Mediation:    Behavioral Health:    Palliative Care: Advance Care Planning:      Plan/Referrals: Continue to visit, (comment)    Narrative:   Reviewed chart prior to visit on [facility] unit for spiritual assessment. No family/friends present. Patient appeared to be resting comfortably; did not wake even after coughing. Unable to assess spiritual needs or concerns at this time.  left a pastoral care note for patient.   available upon referral by staff or by patient/family request.      ROMULO Villanueva, [facility], Staff   RAHEEL MCKEON [facility] Paging Service  [phone])

## 2023-12-13 NOTE — PROGRESS NOTES
EP/ ARRHYTHMIA/ CARDIOLOGY Progress Note    Patient ID:  Patient: Conchetta Rinne  MRN: [id number]  Age: 68 y.o.  : 1950    Date of  Admission: 2023 12:04 PM   PCP:  Benjamin Murguia MD  Usual arrhythmia specialists:  Joe De Los Santos MD    Assessment:   Fainting likely due to orthostatic hypotension, confounded by her UTI. UTI with Klebsiella pnuemoniae. Treating. Atrial fibrillation with prior ablation. Was on propafenone prior, now on amiodarone. Elevated transaminases, unsure of the cause. Hypotension can be the cause of hepatic hypoperfusion, but still sorting this out. Digoxin level undetectable despite this as an OP medication. Hypokalemia, resolved. Fluid overload with interstitial pulmonary edema on CHEST X-RAY 12/10. Net IO Since Admission: 7,652. 62 mL [23 0906]    No data found. .  Chronic lymphedema. Hypoalbuminemia possibly due to poor nutrition. Anemia, probably multifactorial.   Full code. Plan: Will start furosemide 40 mg IV daily. Continue anticoagulation with Xarelto. Continue amiodarone 200 mg daily. Agree with holding atorvastatin. Transaminases improving daily. No digoxin. Little benefit, high risk. Continue midodrine 5 mg po THREE TIMES A DAY. Nutrition team suggested considering an appetite stimulant. Started megestrol 40 mg daily today. See if her pulmonary status improves with diuresis. Otherwise, will need to consider other etiologies. [x]       High complexity decision making was performed in this patient    Conchetta Rinne is a 68 y.o. female with a history of admission for symptomatic UTI. She denies CP, syncope, dizziness, palpitations, or edema. NO TIA or stroke like symptoms on my interview. She feels weak. +nonproductive cough. Per the hospitalist H&P:  Anuj Betancur is a 68 y.o. female with a history of Afib, HTN, depression and dementia who was brought in by EMS for AMS and weakness.   Patient

## 2023-12-13 NOTE — PROGRESS NOTES
Report given to [facility], RN by Jesus Armenta RN    End of Shift Note    Bedside shift change report given to Raman Valle (oncoming nurse) by Quentin Castillo RN (offgoing nurse).   Report included the following information SBAR, Kardex, Intake/Output, MAR, and Cardiac Rhythm NSR    Shift worked:  7p-7a     Shift summary and any significant changes:     Uneventful shift     Concerns for physician to address:      Zone phone for oncoming shift:              Quentin Castillo RN

## 2023-12-13 NOTE — PLAN OF CARE
Problem: Occupational Therapy - Adult  Goal: By Discharge: Performs self-care activities at highest level of function for planned discharge setting. See evaluation for individualized goals. Description: FUNCTIONAL STATUS PRIOR TO ADMISSION:  Lives alone in an apartment and was Mod I with basic self-care and light IADL. Receives Help From: Family     HOME SUPPORT: Her daughter lives nearby, and assists as needed. Occupational Therapy Goals:  12/12/2023: All goals reviewed. Pt is not progressing towards goals and all were downgraded. See below and reassess in 7 days. Initiated 12/5/2023  1. Patient will perform grooming with Modified [place] within 7 day(s). 12/12/2023:Not progressing downgrade to moderate assistance  2. Patient will perform lower body dressing with Modified [place] within 7 day(s). 12/12/2023:Not progressing downgrade to moderate assistance  3. Patient will perform bathing with Supervision within 7 day(s). 12/12/2023:Not progressing downgrade to moderate assistance  4. Patient will perform toilet transfers with Modified [place]  within 7 day(s). 12/12/2023:Not progressing downgrade to moderate assistance  5. Patient will perform all aspects of toileting with Modified [place] within 7 day(s). 12/12/2023:Not progressing downgrade to moderate assistance   6. Patient will independently utilize energy conservation and recommended equipment during ADL within 7 day(s).  12/12/2023:Not progressing downgrade to moderate assistance  Outcome: Not Progressing    OCCUPATIONAL THERAPY TREATMENT  Patient: Chapis Simmons (15 y.o. female)  Date: 12/13/2023  Primary Diagnosis: Nausea [R11.0]  Transaminitis [R74.01]  SAMUEL (acute kidney injury) ([address]) [N17.9]  Acute cystitis without hematuria [N30.00]  Sepsis ([address]) [A41.9]  Severe sepsis ([address]) [A41.9, R65.20]       Precautions: Fall Risk, Bed Alarm, General Precautions, Contact Precautions, Other (comment) (ESBL; orthostatic

## 2023-12-13 NOTE — PROGRESS NOTES
- 47.0 %    MCV 97.4 80.0 - 99.0 FL    MCH 31.8 26.0 - 34.0 PG    MCHC 32.7 30.0 - 36.5 g/dL    RDW 15.6 (H) 11.5 - 14.5 %    Platelets 289 (L) 129 - 400 K/uL    MPV 11.1 8.9 - 12.9 FL    Nucleated RBCs 0.0 0  WBC    nRBC 0.00 0.00 - 0.01 K/uL    Neutrophils % 67 32 - 75 %    Lymphocytes % 14 12 - 49 %    Monocytes % 13 5 - 13 %    Eosinophils % 5 0 - 7 %    Basophils % 0 0 - 1 %    Immature Granulocytes 1 (H) 0.0 - 0.5 %    Neutrophils Absolute 4.5 1.8 - 8.0 K/UL    Lymphocytes Absolute 0.9 0.8 - 3.5 K/UL    Monocytes Absolute 0.9 0.0 - 1.0 K/UL    Eosinophils Absolute 0.3 0.0 - 0.4 K/UL    Basophils Absolute 0.0 0.0 - 0.1 K/UL    Absolute Immature Granulocyte 0.0 0.00 - 0.04 K/UL    Differential Type AUTOMATED     Comprehensive Metabolic Panel    Collection Time: 12/13/23  6:17 AM   Result Value Ref Range    Sodium 140 136 - 145 mmol/L    Potassium 3.6 3.5 - 5.1 mmol/L    Chloride 105 97 - 108 mmol/L    CO2 32 21 - 32 mmol/L    Anion Gap 3 (L) 5 - 15 mmol/L    Glucose 86 65 - 100 mg/dL    BUN 13 6 - 20 MG/DL    Creatinine 0.40 (L) 0.55 - 1.02 MG/DL    Bun/Cre Ratio 33 (H) 12 - 20      Est, Glom Filt Rate >60 >60 ml/min/1.73m2    Calcium 8.5 8.5 - 10.1 MG/DL    Total Bilirubin 0.7 0.2 - 1.0 MG/DL     (H) 12 - 78 U/L     (H) 15 - 37 U/L    Alk Phosphatase 119 (H) 45 - 117 U/L    Total Protein 4.4 (L) 6.4 - 8.2 g/dL    Albumin 1.5 (L) 3.5 - 5.0 g/dL    Globulin 2.9 2.0 - 4.0 g/dL    Albumin/Globulin Ratio 0.5 (L) 1.1 - 2.2             CT abd pelvis:   No acute process. CXR:   No acute process on portable chest.     Repeat CXR 12/11:   Interval development of pulmonary edema     Yenifer Kingston MD, FACP  I am available on [employer].

## 2023-12-14 PROCEDURE — 6370000000 HC RX 637 (ALT 250 FOR IP): Performed by: INTERNAL MEDICINE

## 2023-12-14 PROCEDURE — 99233 SBSQ HOSP IP/OBS HIGH 50: CPT | Performed by: INTERNAL MEDICINE

## 2023-12-14 PROCEDURE — 1100000003 HC PRIVATE W/ TELEMETRY

## 2023-12-14 PROCEDURE — 2580000003 HC RX 258: Performed by: INTERNAL MEDICINE

## 2023-12-14 PROCEDURE — 6370000000 HC RX 637 (ALT 250 FOR IP): Performed by: STUDENT IN AN ORGANIZED HEALTH CARE EDUCATION/TRAINING PROGRAM

## 2023-12-14 PROCEDURE — 6360000002 HC RX W HCPCS: Performed by: INTERNAL MEDICINE

## 2023-12-14 PROCEDURE — 94640 AIRWAY INHALATION TREATMENT: CPT

## 2023-12-14 RX ORDER — CASTOR OIL AND BALSAM, PERU 788; 87 MG/G; MG/G
OINTMENT TOPICAL 3 TIMES DAILY
Qty: 1 G | Refills: 0 | Status: SHIPPED
Start: 2023-12-14 | End: 2023-12-24

## 2023-12-14 RX ORDER — MIDODRINE HYDROCHLORIDE 5 MG/1
5 TABLET ORAL
Qty: 90 TABLET | Refills: 3 | Status: SHIPPED | OUTPATIENT
Start: 2023-12-14 | End: 2023-12-24

## 2023-12-14 RX ORDER — POLYETHYLENE GLYCOL 3350 17 G/17G
17 POWDER, FOR SOLUTION ORAL DAILY PRN
Qty: 527 G | Refills: 0 | Status: SHIPPED
Start: 2023-12-14 | End: 2023-12-24

## 2023-12-14 RX ORDER — CASTOR OIL AND BALSAM, PERU 788; 87 MG/G; MG/G
OINTMENT TOPICAL 3 TIMES DAILY
Status: DISCONTINUED | OUTPATIENT
Start: 2023-12-14 | End: 2023-12-22

## 2023-12-14 RX ORDER — MEGESTROL ACETATE 40 MG/1
40 TABLET ORAL DAILY
Qty: 30 TABLET | Refills: 3 | Status: SHIPPED
Start: 2023-12-15 | End: 2023-12-24

## 2023-12-14 RX ORDER — AMIODARONE HYDROCHLORIDE 200 MG/1
200 TABLET ORAL DAILY
Qty: 1 TABLET | Refills: 0 | Status: SHIPPED
Start: 2023-12-15 | End: 2023-12-24

## 2023-12-14 RX ORDER — ENEMA 19; 7 G/133ML; G/133ML
1 ENEMA RECTAL
Status: DISPENSED | OUTPATIENT
Start: 2023-12-14 | End: 2023-12-15

## 2023-12-14 RX ADMIN — Medication: at 12:17

## 2023-12-14 RX ADMIN — MIDODRINE HYDROCHLORIDE 5 MG: 5 TABLET ORAL at 10:07

## 2023-12-14 RX ADMIN — SODIUM CHLORIDE, PRESERVATIVE FREE 10 ML: 5 INJECTION INTRAVENOUS at 21:45

## 2023-12-14 RX ADMIN — POLYETHYLENE GLYCOL 3350 17 G: 17 POWDER, FOR SOLUTION ORAL at 10:06

## 2023-12-14 RX ADMIN — MIDODRINE HYDROCHLORIDE 5 MG: 5 TABLET ORAL at 12:17

## 2023-12-14 RX ADMIN — RIVAROXABAN 20 MG: 20 TABLET, FILM COATED ORAL at 10:07

## 2023-12-14 RX ADMIN — FUROSEMIDE 40 MG: 10 INJECTION, SOLUTION INTRAMUSCULAR; INTRAVENOUS at 10:08

## 2023-12-14 RX ADMIN — SODIUM CHLORIDE, PRESERVATIVE FREE 10 ML: 5 INJECTION INTRAVENOUS at 10:09

## 2023-12-14 RX ADMIN — Medication: at 21:45

## 2023-12-14 RX ADMIN — DONEPEZIL HYDROCHLORIDE 10 MG: 5 TABLET, FILM COATED ORAL at 21:44

## 2023-12-14 RX ADMIN — MIDODRINE HYDROCHLORIDE 5 MG: 5 TABLET ORAL at 17:29

## 2023-12-14 RX ADMIN — PAROXETINE HYDROCHLORIDE 10 MG: 20 TABLET, FILM COATED ORAL at 10:07

## 2023-12-14 RX ADMIN — ACETAMINOPHEN 650 MG: 325 TABLET ORAL at 21:44

## 2023-12-14 RX ADMIN — POTASSIUM BICARBONATE 40 MEQ: 782 TABLET, EFFERVESCENT ORAL at 10:07

## 2023-12-14 RX ADMIN — MAGNESIUM CITRATE 296 ML: 1.75 LIQUID ORAL at 12:23

## 2023-12-14 RX ADMIN — MEGESTROL ACETATE 40 MG: 40 TABLET ORAL at 10:15

## 2023-12-14 RX ADMIN — AMIODARONE HYDROCHLORIDE 200 MG: 200 TABLET ORAL at 10:06

## 2023-12-14 RX ADMIN — Medication: at 10:08

## 2023-12-14 NOTE — PROGRESS NOTES
Spiritual Care Assessment/Progress Note  Mariano    Name: Conchetta Rinne MRN: [id number]    Age: 68 y.o. Sex: female   Language: English     Date: 12/14/2023            Total Time Calculated: 10 min              Spiritual Assessment begun in [facility] 2 [address] DOWN  Service Provided For[de-identified] Patient  Referral/Consult From[de-identified] Multi-disciplinary team  Encounter Overview/Reason : Spiritual/Emotional Needs    Spiritual beliefs:      [] Involved in a attila tradition/spiritual practice:      [] Supported by a attila community:      [] Claims no spiritual orientation:      [] Seeking spiritual identity:           [] Adheres to an individual form of spirituality:      [x] Not able to assess:                Identified resources for coping and support system:   Support System: Family members       [] Prayer                  [] Devotional reading               [] Music                  [] Guided Imagery     [] Pet visits                                        [] Other: (COMMENT)     Specific area/focus of visit   Encounter:    Crisis:    Spiritual/Emotional needs:    Ritual, Rites and Sacraments:    Grief, Loss, and Adjustments:    Ethics/Mediation:    Behavioral Health:    Palliative Care: Advance Care Planning:      Plan/Referrals: Continue Support (comment)    Narrative:    responded to staff consults/orders request for pastoral support in 2160. Patient appeared to be sleeping, no family or friend present. Please contact pastoral care for further referrals. Visited by: Max Major M.Div., [address] [place] [address].    Paging Service: [phone])

## 2023-12-14 NOTE — WOUND CARE
Wound care nurse consult for worsening wounds to sacrum/buttocks    69 y/o CF last seen on admission by wound care for POA sacral stage 2 pressure injury complicated by denuded skin from MASD/ICD from urinary incontinence. Patient admitted for Sepsis from UTI and generalized weakness. She has dementia and depression and is not eating and not progressing towards goals with OT/PT. Patient had erythema to buttock edges POA and it has progressed to be Deep Tissue Pressure Injury. Recommend:    Sacrum/buttocks: apply Venelex/BPO to clean dry skin. Gently cleanse with soap and water when soiled. Ensure no wrinkles in sheets under patient.     Jacinto Avila RN

## 2023-12-14 NOTE — PROGRESS NOTES
Bedside and Verbal shift change report given to [facility] (oncoming nurse) by Raman Valle (offgoing nurse). Report included the following information Nurse Handoff Report, Index, Intake/Output, MAR, Recent Results, and Cardiac Rhythm NSR . End of Shift Note    Bedside shift change report given to  (oncoming nurse) by Quentin Castillo RN (offgoing nurse). Report included the following information SBAR, Kardex, Intake/Output, MAR, Recent Results, and Cardiac Rhythm NSR    Shift worked:  7p-7a     Shift summary and any significant changes:     Uneventful shift    Perhaps  -Tristian? Concerns for physician to address:     Zone phone for oncoming shift:            SHITAL JOYCE RN          James Score 15. All of the following interventions have been implemented to prevent pressure injury:    SKIN ASSESSMENT (S)  Dual skin assessment completed at shift change: Yes  Name of second RN who completed Dual Skin Assessment: Raman Valle RN  Picture of wound uploaded to EMR: Yes  Venelex ordered and given per protocol: Yes  Wound care consulted if wounds present: Yes    SURFACE (S)  Llewellyn air pump or specialty bed ordered: Ordered -pending being brought   Type of bed: currently [facility]  Only white chux used with specialty surfaces (no green chux used): Yes  Waffle cushion used for chair positioning: NA    KEEP MOVING (K)  Mobility status (Bedrest, Chairbound, UWA x 1 assist , 2 assist, Max assist): ax1-2  Q2 hour turns documented: Yes  Refusals to turn and education provided documented: NA  Device used to float heels: pillows  PT/OT consulted: Yes    INCONTINENCE (I)  Incontinence status assessed Q2 hours: Yes  External catheter in use: purewick  Barrier cream in use: venelex    NUTRITION (N)  I/O's documented every 8 hours: Yes  Oral supplements ordered if appropriate: Yes  Nutrition services consulted: Yes    All concerns about new DTI's must be escalated directly to attending MD, charge nurse, [address] [place] [address] and nurse director.

## 2023-12-14 NOTE — PROGRESS NOTES
EP/ ARRHYTHMIA/ CARDIOLOGY Progress Note    Patient ID:  Patient: Marin Bah  MRN: [id number]  Age: 68 y.o.  : 1950    Date of  Admission: 2023 12:04 PM   PCP:  Katie Guerra MD  Usual arrhythmia specialists:  Joo Mcmillan MD    Assessment:   Fainting likely due to orthostatic hypotension, confounded by her UTI. UTI with Klebsiella pnuemoniae. Treating. Atrial fibrillation with prior ablation. Was on propafenone prior, now on amiodarone. Elevated transaminases, unsure of the cause. Hypotension can be the cause of hepatic hypoperfusion, but still sorting this out. Digoxin level undetectable despite this as an OP medication. Hypokalemia, resolved. Fluid overload with interstitial pulmonary edema on CHEST X-RAY 12/10. Net IO Since Admission: 6,752. 62 mL [23 1647]    No data found. .  Chronic lymphedema. Hypoalbuminemia possibly due to poor nutrition. Anemia, probably multifactorial.   Full code. Plan:     Continue furosemide 40 mg IV daily for one more dose tomorrow AM.  Agree with checking CMP tomorrow. Continue anticoagulation with Xarelto. Continue amiodarone 200 mg daily. Agree with holding atorvastatin. Transaminases improving. NO urgency to rechallenge with lower dose atorvastatin, this can be done as an OP. No digoxin. Little benefit, high risk. Continue midodrine 5 mg po THREE TIMES A DAY. Nutrition team suggested considering an appetite stimulant. Started megestrol 40 mg daily. Weak. Can't walk safely. [x]       High complexity decision making was performed in this patient    Marin Bah is a 68 y.o. female with a history of admission for symptomatic UTI. She denies CP, syncope, dizziness, palpitations, or edema. NO TIA or stroke like symptoms on my interview. She feels weak. +nonproductive cough.     Per the hospitalist H&P:  Nayeli Barrera is a 68 y.o. female with a history of Afib, HTN, depression and dementia

## 2023-12-14 NOTE — PROGRESS NOTES
End of Shift Note    Bedside shift change report given to [email or web address] (oncoming nurse) by Liat Meeks RN (offgoing nurse). Report included the following information SBAR, Kardex, Intake/Output, MAR, Recent Results, and Cardiac Rhythm sr    Shift worked:  Days       Shift summary and any significant changes:     Pt more awake today ate a few bites of food, pt seen by wc and has updated orders     Concerns for physician to address:       Zone phone for oncoming shift:          Activity:     Number times ambulated in hallways past shift: 0  Number of times OOB to chair past shift: 0    Cardiac:   Cardiac Monitoring: Yes           Access:  Current line(s): PIV     Genitourinary:   Urinary status: external catheter    Respiratory:      Chronic home O2 use?: NO  Incentive spirometer at bedside: NO       GI:     Current diet:  ADULT ORAL NUTRITION SUPPLEMENT; Lunch, Dinner; Frozen Oral Supplement  ADULT DIET;  Regular; Trist[place]  Passing flatus: YES  Tolerating current diet: NO       Pain Management:   Patient states pain is manageable on current regimen: YES    Skin:     Interventions: specialty bed, float heels, foam dressing, internal/external urinary devices, and nutritional support    Patient Safety:  Fall Score:    Interventions: bed/chair alarm       Length of Stay:  Expected LOS: 11  Actual LOS: 10      Liat Meeks, RN

## 2023-12-14 NOTE — PLAN OF CARE
Problem: Discharge Planning  Goal: Discharge to home or other facility with appropriate resources  Outcome: Not Progressing  Flowsheets (Taken 12/14/2023 0816)  Discharge to home or other facility with appropriate resources: Identify barriers to discharge with patient and caregiver     Problem: Safety - Adult  Goal: Free from fall injury  Outcome: Not Progressing     Problem: Skin/Tissue Integrity  Goal: Absence of new skin breakdown  Description: 1. Monitor for areas of redness and/or skin breakdown  2. Assess vascular access sites hourly  3. Every 4-6 hours minimum:  Change oxygen saturation probe site  4. Every 4-6 hours:  If on nasal continuous positive airway pressure, respiratory therapy assess nares and determine need for appliance change or resting period. Outcome: Not Progressing     Problem: Metabolic/Fluid and Electrolytes - Adult  Goal: Electrolytes maintained within normal limits  Outcome: Not Progressing  Flowsheets (Taken 12/14/2023 0816)  Electrolytes maintained within normal limits: Monitor labs and assess patient for signs and symptoms of electrolyte imbalances     Problem: Hematologic - Adult  Goal: Maintains hematologic stability  Outcome: Not Progressing  Flowsheets (Taken 12/14/2023 0816)  Maintains hematologic stability: Assess for signs and symptoms of bleeding or hemorrhage     Problem: Discharge Planning  Goal: Discharge to home or other facility with appropriate resources  Outcome: Not Progressing  Flowsheets (Taken 12/14/2023 0816)  Discharge to home or other facility with appropriate resources: Identify barriers to discharge with patient and caregiver     Problem: Safety - Adult  Goal: Free from fall injury  Outcome: Not Progressing     Problem: Skin/Tissue Integrity  Goal: Absence of new skin breakdown  Description: 1. Monitor for areas of redness and/or skin breakdown  2. Assess vascular access sites hourly  3. Every 4-6 hours minimum:  Change oxygen saturation probe site  4. Every 4-6 hours:  If on nasal continuous positive airway pressure, respiratory therapy assess nares and determine need for appliance change or resting period. Outcome: Not Progressing     Problem: Metabolic/Fluid and Electrolytes - Adult  Goal: Electrolytes maintained within normal limits  Outcome: Not Progressing  Flowsheets (Taken 12/14/2023 0816)  Electrolytes maintained within normal limits: Monitor labs and assess patient for signs and symptoms of electrolyte imbalances     Problem: Hematologic - Adult  Goal: Maintains hematologic stability  Outcome: Not Progressing  Flowsheets (Taken 12/14/2023 0816)  Maintains hematologic stability: Assess for signs and symptoms of bleeding or hemorrhage     Problem: Discharge Planning  Goal: Discharge to home or other facility with appropriate resources  Outcome: Not Progressing  Flowsheets (Taken 12/14/2023 0816)  Discharge to home or other facility with appropriate resources: Identify barriers to discharge with patient and caregiver     Problem: Safety - Adult  Goal: Free from fall injury  Outcome: Not Progressing     Problem: Skin/Tissue Integrity  Goal: Absence of new skin breakdown  Description: 1. Monitor for areas of redness and/or skin breakdown  2. Assess vascular access sites hourly  3. Every 4-6 hours minimum:  Change oxygen saturation probe site  4. Every 4-6 hours:  If on nasal continuous positive airway pressure, respiratory therapy assess nares and determine need for appliance change or resting period.   Outcome: Not Progressing     Problem: Metabolic/Fluid and Electrolytes - Adult  Goal: Electrolytes maintained within normal limits  Outcome: Not Progressing  Flowsheets (Taken 12/14/2023 0816)  Electrolytes maintained within normal limits: Monitor labs and assess patient for signs and symptoms of electrolyte imbalances     Problem: Hematologic - Adult  Goal: Maintains hematologic stability  Outcome: Not Progressing  Flowsheets (Taken 12/14/2023

## 2023-12-14 NOTE — CARE COORDINATION
Transition of Care Plan:     RUR: 19% (moderate RUR)  Prior Level of Functioning: Independent   Disposition: SNF - [facility]     Back up plan: [facility] (accepted)  If SNF or IPR: Date FOC offered: 12/6 [facility], 12/8 SNF  Date [address] received: 12/6 - no preference for [facility], 12/8 [facility]  Accepting facility: [address] [place] [address]  Date authorization started with reference number: 12/12/2023, Ref ID #: [id number]   Date authorization received and expires: 12/14/2023 to 12/16/2023, Ingrid Jacques ID# - [id number]. Follow up appointments: defer to SNF  DME needed: defer to SNF  Transportation at discharge: Family vs. Transport  IM/IMM Medicare/ letter given: 2nd IM needed prior to discharge. Is patient a [place] and connected with VA? No.              If yes, was [employer] transfer form completed and VA notified? N/A  Caregiver Contact: Henry Hong DTR - [phone] - [email or web address]  Discharge Caregiver contacted prior to discharge? Patient to contact. Care Conference needed? No.  Barriers to discharge: cards clearance    1420 - Patient's authorization approved and [address] [place] [address] admissions is looking for a bed for the patient today:    Ingrid Jacques ID# - [id number], Pastor Ref# [id number], Auth good through 11:59PM 12-16-23.    2136 - Message left for [address] [place] [address]. 1353 to 1401 - CM spoke with DTR who is understanding of plan to discharge to [address] [place] [address] tomorrow. 1614 - CM received confirmation that [address] [place] [address] will have a bed for the patient tomorrow. CG resources and IM letter sent to Bruce [place] (DTR) via email.       Venancio Valdovinos, TYESHAN, RN    Care Management  [phone]

## 2023-12-15 PROBLEM — R55 SYNCOPE: Status: ACTIVE | Noted: 2023-12-15

## 2023-12-15 PROBLEM — I48.91 ATRIAL FIBRILLATION (HCC): Status: ACTIVE | Noted: 2023-12-15

## 2023-12-15 PROBLEM — N17.9 AKI (ACUTE KIDNEY INJURY) (HCC): Status: ACTIVE | Noted: 2023-12-15

## 2023-12-15 PROBLEM — R74.01 ELEVATED TRANSAMINASE LEVEL: Status: ACTIVE | Noted: 2023-12-15

## 2023-12-15 PROBLEM — I95.1 ORTHOSTATIC HYPOTENSION: Status: ACTIVE | Noted: 2023-06-15

## 2023-12-15 PROBLEM — N30.00 ACUTE CYSTITIS WITHOUT HEMATURIA: Status: ACTIVE | Noted: 2023-12-15

## 2023-12-15 PROBLEM — J81.0 ACUTE PULMONARY EDEMA (HCC): Status: ACTIVE | Noted: 2023-12-15

## 2023-12-15 PROBLEM — F03.90 DEMENTIA (HCC): Status: ACTIVE | Noted: 2023-12-15

## 2023-12-15 LAB
ALBUMIN SERPL-MCNC: 1.7 G/DL (ref 3.5–5)
ALBUMIN/GLOB SERPL: 0.5 (ref 1.1–2.2)
ALP SERPL-CCNC: 129 U/L (ref 45–117)
ALT SERPL-CCNC: 155 U/L (ref 12–78)
ANION GAP SERPL CALC-SCNC: 2 MMOL/L (ref 5–15)
AST SERPL-CCNC: 188 U/L (ref 15–37)
BASOPHILS # BLD: 0 K/UL (ref 0–0.1)
BASOPHILS NFR BLD: 0 % (ref 0–1)
BILIRUB SERPL-MCNC: 1 MG/DL (ref 0.2–1)
BUN SERPL-MCNC: 23 MG/DL (ref 6–20)
BUN/CREAT SERPL: 47 (ref 12–20)
CALCIUM SERPL-MCNC: 8.8 MG/DL (ref 8.5–10.1)
CHLORIDE SERPL-SCNC: 104 MMOL/L (ref 97–108)
CO2 SERPL-SCNC: 37 MMOL/L (ref 21–32)
CREAT SERPL-MCNC: 0.49 MG/DL (ref 0.55–1.02)
DIFFERENTIAL METHOD BLD: ABNORMAL
EOSINOPHIL # BLD: 0.1 K/UL (ref 0–0.4)
EOSINOPHIL NFR BLD: 1 % (ref 0–7)
ERYTHROCYTE [DISTWIDTH] IN BLOOD BY AUTOMATED COUNT: 15.6 % (ref 11.5–14.5)
GLOBULIN SER CALC-MCNC: 3.1 G/DL (ref 2–4)
GLUCOSE SERPL-MCNC: 88 MG/DL (ref 65–100)
HCT VFR BLD AUTO: 33.3 % (ref 35–47)
HGB BLD-MCNC: 10.7 G/DL (ref 11.5–16)
IMM GRANULOCYTES # BLD AUTO: 0.1 K/UL (ref 0–0.04)
IMM GRANULOCYTES NFR BLD AUTO: 1 % (ref 0–0.5)
LYMPHOCYTES # BLD: 1.5 K/UL (ref 0.8–3.5)
LYMPHOCYTES NFR BLD: 18 % (ref 12–49)
MCH RBC QN AUTO: 30.8 PG (ref 26–34)
MCHC RBC AUTO-ENTMCNC: 32.1 G/DL (ref 30–36.5)
MCV RBC AUTO: 96 FL (ref 80–99)
MONOCYTES # BLD: 0.5 K/UL (ref 0–1)
MONOCYTES NFR BLD: 7 % (ref 5–13)
NEUTS SEG # BLD: 5.7 K/UL (ref 1.8–8)
NEUTS SEG NFR BLD: 72 % (ref 32–75)
NRBC # BLD: 0 K/UL (ref 0–0.01)
NRBC BLD-RTO: 0 PER 100 WBC
PLATELET # BLD AUTO: 130 K/UL (ref 150–400)
PMV BLD AUTO: 11.4 FL (ref 8.9–12.9)
POTASSIUM SERPL-SCNC: 3.6 MMOL/L (ref 3.5–5.1)
PROT SERPL-MCNC: 4.8 G/DL (ref 6.4–8.2)
RBC # BLD AUTO: 3.47 M/UL (ref 3.8–5.2)
SODIUM SERPL-SCNC: 143 MMOL/L (ref 136–145)
WBC # BLD AUTO: 7.9 K/UL (ref 3.6–11)

## 2023-12-15 PROCEDURE — 6370000000 HC RX 637 (ALT 250 FOR IP): Performed by: STUDENT IN AN ORGANIZED HEALTH CARE EDUCATION/TRAINING PROGRAM

## 2023-12-15 PROCEDURE — 36415 COLL VENOUS BLD VENIPUNCTURE: CPT

## 2023-12-15 PROCEDURE — 6370000000 HC RX 637 (ALT 250 FOR IP): Performed by: INTERNAL MEDICINE

## 2023-12-15 PROCEDURE — 99233 SBSQ HOSP IP/OBS HIGH 50: CPT | Performed by: INTERNAL MEDICINE

## 2023-12-15 PROCEDURE — 6360000002 HC RX W HCPCS: Performed by: INTERNAL MEDICINE

## 2023-12-15 PROCEDURE — 2580000003 HC RX 258: Performed by: INTERNAL MEDICINE

## 2023-12-15 PROCEDURE — 80053 COMPREHEN METABOLIC PANEL: CPT

## 2023-12-15 PROCEDURE — 1100000003 HC PRIVATE W/ TELEMETRY

## 2023-12-15 PROCEDURE — 85025 COMPLETE CBC W/AUTO DIFF WBC: CPT

## 2023-12-15 RX ORDER — FUROSEMIDE 10 MG/ML
40 INJECTION INTRAMUSCULAR; INTRAVENOUS DAILY
Status: COMPLETED | OUTPATIENT
Start: 2023-12-16 | End: 2023-12-18

## 2023-12-15 RX ADMIN — Medication: at 08:25

## 2023-12-15 RX ADMIN — MIDODRINE HYDROCHLORIDE 5 MG: 5 TABLET ORAL at 12:44

## 2023-12-15 RX ADMIN — SODIUM CHLORIDE, PRESERVATIVE FREE 10 ML: 5 INJECTION INTRAVENOUS at 22:45

## 2023-12-15 RX ADMIN — RIVAROXABAN 20 MG: 20 TABLET, FILM COATED ORAL at 08:26

## 2023-12-15 RX ADMIN — Medication: at 22:44

## 2023-12-15 RX ADMIN — DONEPEZIL HYDROCHLORIDE 10 MG: 5 TABLET, FILM COATED ORAL at 22:52

## 2023-12-15 RX ADMIN — POTASSIUM BICARBONATE 40 MEQ: 782 TABLET, EFFERVESCENT ORAL at 08:26

## 2023-12-15 RX ADMIN — FUROSEMIDE 40 MG: 10 INJECTION, SOLUTION INTRAMUSCULAR; INTRAVENOUS at 08:27

## 2023-12-15 RX ADMIN — Medication: at 12:44

## 2023-12-15 RX ADMIN — MEGESTROL ACETATE 40 MG: 40 TABLET ORAL at 10:20

## 2023-12-15 RX ADMIN — AMIODARONE HYDROCHLORIDE 200 MG: 200 TABLET ORAL at 08:27

## 2023-12-15 RX ADMIN — PAROXETINE HYDROCHLORIDE 10 MG: 20 TABLET, FILM COATED ORAL at 08:26

## 2023-12-15 RX ADMIN — MIDODRINE HYDROCHLORIDE 5 MG: 5 TABLET ORAL at 08:27

## 2023-12-15 RX ADMIN — SODIUM CHLORIDE, PRESERVATIVE FREE 10 ML: 5 INJECTION INTRAVENOUS at 08:27

## 2023-12-15 RX ADMIN — MIDODRINE HYDROCHLORIDE 5 MG: 5 TABLET ORAL at 16:42

## 2023-12-15 NOTE — PROGRESS NOTES
Physician Progress Note      PATIENT:               Padilla Marinelli  CSN #:                  [id number]  :                       1950  ADMIT DATE:       2023 12:04 PM  [facility] DATE:  RESPONDING  PROVIDER #:        Laly Damon MD          QUERY TEXT:    Dr Carla Huston  Pt admitted with sepsis/UTI. Pt noted to have fluid overload with interstitial   edema on Xray with net I/0 8.6 liters positive. If possible, please document   in the progress notes and discharge summary if you are evaluating and/or   treating any of the following: The medical record reflects the following:  Risk Factors: Afib, HTN, Dementia, Depression  Clinical Indicators: Fluid overload with interstitial pulmonary edema on Chest    X-RAY 12/10  Treatment: Lasix 40 IV again today. Negative 900 yesterday. Try to have her   sitting up more to clear secretions. Lungs are congested. Options provided:  -- Acute pulmonary edema due to heart disease  -- Acute pulmonary edema due to heart failure  -- Noncardiogenic acute pulmonary edema due to IVF  -- Other - I will add my own diagnosis  -- Disagree - Not applicable / Not valid  -- Disagree - Clinically unable to determine / Unknown  -- Refer to Clinical Documentation Reviewer    PROVIDER RESPONSE TEXT:    This patient has acute pulmonary edema due to heart disease. Query created by:  Stefano Macias on 2023 10:37 AM      Electronically signed by:  Laly Damon MD 12/15/2023 8:01 AM

## 2023-12-15 NOTE — PROGRESS NOTES
Physical Therapy:  Attempted PT session, patient quietly resting in bed. She politely declines to participate with therapy, states \"I just don't feel like doing anything. \" Therapist offered bed exercises for treatment with patient continuing to decline. Will defer per patient request and continue to follow.     Radha [place] PT, DPT

## 2023-12-15 NOTE — PROGRESS NOTES
Comprehensive Nutrition Assessment    Type and Reason for Visit:  Reassess    Nutrition Recommendations/Plan:   Continue current diet and supplements - provide lots of encouragement  Please document % meals and supplements consumed in flowsheet I/O's under intake   Pt is already receiving megace which can help stimulate appetite. Unfortunately, loss of appetite is anticipated in progressing dementia especially in an unfamiliar environment. Pt has multiple supplements on board but is generally refusing to eat. She may benefit from additional encouragement with meals and supplements, but not in such a way to cause her distress. Recommend consult to Palliative services for goals of care discussion. Pt will not be a candidate for nutrition support 2/2 dementia. Malnutrition Assessment:  Malnutrition Status:  Severe malnutrition (12/06/23 1611)    Context:  Chronic Illness     Findings of the 6 clinical characteristics of malnutrition:  Energy Intake:  75% or less estimated energy requirements for 1 month or longer  Weight Loss:  Greater than 20% over 1 year     Body Fat Loss:  Severe body fat loss (Moderate) Orbital, Triceps   Muscle Mass Loss:  Severe muscle mass loss (Moderate) Temples (temporalis), Clavicles (pectoralis & deltoids), Hand (interosseous), Scapula (trapezius)  Fluid Accumulation:  Severe Extremities, Generalized   Strength:  Not Performed    Nutrition Assessment:     Chart reviewed and case discussed during IDR. Dietitian has been following regularly. Pt continues to refuse to eat. She has multiple supplements ordered and reports consuming some, occasionally. She has a very flat affect and has no desire to eat. She refuses offers to get her anything. Recommend lots of encouragement from nursing team during meals, providing snacks and family bringing food that may be of higher interest to Ms. Carlos Rosas.      Patient Vitals for the past 120 hrs:   PO Meals Eaten (%)   12/14/23 1737 0%   12/14/23

## 2023-12-15 NOTE — PROGRESS NOTES
Spiritual Care Assessment/Progress Note  Mariano    Name: Audra Nichols MRN: [id number]    Age: 68 y.o. Sex: female   Language: English     Date: 12/15/2023            Total Time Calculated: 23 min              Spiritual Assessment begun in [facility] 2 [address] DOWN  Service Provided For[de-identified] Patient  Referral/Consult From[de-identified] Family  Encounter Overview/Reason : Spiritual/Emotional Needs    Spiritual beliefs:      [x] Involved in a attila tradition/spiritual practice:      [] Supported by a attila community:      [] Claims no spiritual orientation:      [] Seeking spiritual identity:           [] Adheres to an individual form of spirituality:      [] Not able to assess:                Identified resources for coping and support system:   Support System: Family members       [] Prayer                  [] Devotional reading               [] Music                  [] Guided Imagery     [] Pet visits                                        [] Other: (COMMENT)     Specific area/focus of visit   Encounter:    Crisis:    Spiritual/Emotional needs: Type: Spiritual Support  Ritual, Rites and Sacraments:    Grief, Loss, and Adjustments:    Ethics/Mediation:    Behavioral Health:    Palliative Care: Advance Care Planning:      Plan/Referrals: No future visits requested    Narrative:   Stanilda put in consult that patient's daughter requested spiritual/emotional support for patient in 2120. She was awake and alert in bed, but stated she was a little tired from inadequate sleep. She indicated she was however coping better. 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 She has a good support system and attila also is important for coping.  offered supportive listening presence and affirmation, spoke calm encouraging words and assured patient of prayer. She thanked  for the visit. Visited by: Maria G Ramírez M.Div., [address] [place] [address].   Chaplain Murray

## 2023-12-15 NOTE — PROGRESS NOTES
Report received pt laying in bed large bowel movement noted linen changed karen care preformed new purwick     2144  Pt given med without difficulty     2340  Pt resting quietly no distress noted call bell within reach     0206  Rounds made no distress noted call bell within reach     0600  Rounds made pt bed and bath complete call bell within reach     0728  Report given to Brittaney including SBAR opportunity to ask questions presented

## 2023-12-15 NOTE — PROGRESS NOTES
EP/ ARRHYTHMIA/ CARDIOLOGY Progress Note    Patient ID:  Patient: Edgardo Ceja  MRN: [id number]  Age: 68 y.o.  : 1950    Date of  Admission: 2023 12:04 PM   PCP:  Greg Carlisle MD  Usual arrhythmia specialists:  Tsering Urbina MD    Assessment:   Fainting likely due to orthostatic hypotension, confounded by her UTI. UTI with Klebsiella pnuemoniae. Treating. Atrial fibrillation with prior ablation. Was on propafenone prior, now on amiodarone. Elevated transaminases, unsure of the cause. Hypotension can be the cause of hepatic hypoperfusion, but still sorting this out. Digoxin level undetectable despite this as an OP medication. Hypokalemia, resolved. Fluid overload with interstitial pulmonary edema on CHEST X-RAY 12/10. Net IO Since Admission: 6,202.62 mL [12/15/23 1703]    No data found. .  Chronic lymphedema. Hypoalbuminemia possibly due to poor nutrition. Anemia, probably multifactorial.   Full code. Plan:     Continue furosemide 40 mg IV daily. Continue anticoagulation with Xarelto. Continue amiodarone 200 mg daily. Agree with holding atorvastatin. Transaminases improving. NO urgency to rechallenge with lower dose atorvastatin, this can be done as an OP. No digoxin. Little benefit, high risk. Continue midodrine 5 mg po THREE TIMES A DAY. Nutrition team suggested considering an appetite stimulant. Started megestrol 40 mg daily. Cardiology service will see ON REQUEST this weekend. [x]       High complexity decision making was performed in this patient    Edgardo Ceja is a 68 y.o. female with a history of admission for symptomatic UTI. She denies CP, syncope, dizziness, palpitations, or edema. NO TIA or stroke like symptoms on my interview. She feels weak. +nonproductive cough.     Per the hospitalist H&P:  Samara Ha is a 68 y.o. female with a history of Afib, HTN, depression and dementia who was brought in by EMS for AMS and weakness. Patient lives at home alone but her daughter checks in on her daily, either in person or by camera / phone. Yesterday, the daughter checked the home cameras and noted that the patient went to bed early at around 6pm, something not very unusual.  This morning the patient was not on her recliner which was definitely unusual so the daughter tried to call but the patient was not answering. Daughter then came over and found the patient half in bed with her legs over the edge like she could not get up and slumped backwards. EMS was dispatched and patient found with sepsis, UTI, SAMUEL and low BP.  was asked to admit. \"       No Known Allergies       Current Facility-Administered Medications   Medication Dose Route Frequency    [START ON 12/16/2023] furosemide (LASIX) injection 40 mg  40 mg IntraVENous Daily    balsum peru-castor oil (VENELEX) ointment   Topical TID    megestrol (MEGACE) tablet 40 mg  40 mg Oral Daily    rivaroxaban (XARELTO) tablet 20 mg  20 mg Oral Daily with breakfast    potassium bicarb-citric acid (EFFER-K) effervescent tablet 40 mEq  40 mEq Oral Daily    midodrine (PROAMATINE) tablet 5 mg  5 mg Oral TID WC    loperamide (IMODIUM) capsule 2 mg  2 mg Oral 4x Daily PRN    PARoxetine (PAXIL) tablet 10 mg  10 mg Oral Daily    donepezil (ARICEPT) tablet 10 mg  10 mg Oral Nightly    sodium chloride flush 0.9 % injection 5-40 mL  5-40 mL IntraVENous 2 times per day    sodium chloride flush 0.9 % injection 5-40 mL  5-40 mL IntraVENous PRN    0.9 % sodium chloride infusion   IntraVENous PRN    ondansetron (ZOFRAN-ODT) disintegrating tablet 4 mg  4 mg Oral Q8H PRN    Or    ondansetron (ZOFRAN) injection 4 mg  4 mg IntraVENous Q6H PRN    polyethylene glycol (GLYCOLAX) packet 17 g  17 g Oral Daily PRN    acetaminophen (TYLENOL) tablet 650 mg  650 mg Oral Q6H PRN    Or    acetaminophen (TYLENOL) suppository 650 mg  650 mg Rectal Q6H PRN    amiodarone (CORDARONE) tablet 200 mg  200 mg Oral Daily

## 2023-12-15 NOTE — CARE COORDINATION
Attending: Please consider palliative care consult for patient/family for [facility] discussion. Transition of Care Plan:     RUR: 19% (moderate RUR)  Prior Level of Functioning: Independent   Disposition: SNF - [facility]     Back up plan: [facility] (accepted)  If SNF or IPR: Date FOC offered: 12/6 [facility], 12/8 SNF  Date [address] received: 12/6 - no preference for [facility], 12/8 [facility]  Accepting facility: [address] [place] [address]  Date authorization started with reference number: 12/12/2023, Ref ID #: [id number]   Date authorization received and expires: 12/14/2023 to 12/16/2023, Kecia Huang ID# - [id number]. Follow up appointments: defer to SNF  DME needed: defer to SNF  Transportation at discharge: Family vs. Transport  IM/IMM Medicare/ letter given: 2nd IM needed prior to discharge. Is patient a [place] and connected with VA? No.              If yes, was [employer] transfer form completed and VA notified? N/A  Caregiver Contact: Viki Cuenca - DTR - [phone] - [email or web address]  Discharge Caregiver contacted prior to discharge? Patient to contact. Care Conference needed? No.  Barriers to discharge: medical clearance    6772 - CM spoke with [facility] who confirmed they would have a bed for the patient today. CM contacted patient's Kindra Rios, who confirmed she spoke with attending and CCL this morning; Tong Doss said they were in agreement to keep patient in hospital longer prior to SNF. Jenifer aware that discharge order is still in from 12/14/2023. CM notified Tong Selam that patient's SNF authorization ends tomorrow, there is no guarantee that insurance will approve SNF again and she will need to considering taking patient home with assistance as a back up plan. Jenifer agreeable. CM to follow up with team during IDRs. 1030 - Patient discussed during IDRs, palliative care consult seems appropriate for the patient at this time.     [facility] confirmed patient can go into room 103A tomorrow at [facility],

## 2023-12-16 LAB
ALBUMIN SERPL-MCNC: 1.7 G/DL (ref 3.5–5)
ALBUMIN/GLOB SERPL: 0.6 (ref 1.1–2.2)
ALP SERPL-CCNC: 131 U/L (ref 45–117)
ALT SERPL-CCNC: 123 U/L (ref 12–78)
ANION GAP SERPL CALC-SCNC: 2 MMOL/L (ref 5–15)
AST SERPL-CCNC: 165 U/L (ref 15–37)
BASOPHILS # BLD: 0 K/UL (ref 0–0.1)
BASOPHILS NFR BLD: 0 % (ref 0–1)
BILIRUB SERPL-MCNC: 1.1 MG/DL (ref 0.2–1)
BUN SERPL-MCNC: 22 MG/DL (ref 6–20)
BUN/CREAT SERPL: 46 (ref 12–20)
CALCIUM SERPL-MCNC: 8.7 MG/DL (ref 8.5–10.1)
CHLORIDE SERPL-SCNC: 100 MMOL/L (ref 97–108)
CO2 SERPL-SCNC: 39 MMOL/L (ref 21–32)
CREAT SERPL-MCNC: 0.48 MG/DL (ref 0.55–1.02)
DIFFERENTIAL METHOD BLD: ABNORMAL
EOSINOPHIL # BLD: 0.2 K/UL (ref 0–0.4)
EOSINOPHIL NFR BLD: 2 % (ref 0–7)
ERYTHROCYTE [DISTWIDTH] IN BLOOD BY AUTOMATED COUNT: 15.3 % (ref 11.5–14.5)
GLOBULIN SER CALC-MCNC: 3 G/DL (ref 2–4)
GLUCOSE SERPL-MCNC: 89 MG/DL (ref 65–100)
HCT VFR BLD AUTO: 30.7 % (ref 35–47)
HGB BLD-MCNC: 9.9 G/DL (ref 11.5–16)
IMM GRANULOCYTES # BLD AUTO: 0.1 K/UL (ref 0–0.04)
IMM GRANULOCYTES NFR BLD AUTO: 1 % (ref 0–0.5)
LYMPHOCYTES # BLD: 1.2 K/UL (ref 0.8–3.5)
LYMPHOCYTES NFR BLD: 16 % (ref 12–49)
MCH RBC QN AUTO: 30.7 PG (ref 26–34)
MCHC RBC AUTO-ENTMCNC: 32.2 G/DL (ref 30–36.5)
MCV RBC AUTO: 95.3 FL (ref 80–99)
MONOCYTES # BLD: 0.4 K/UL (ref 0–1)
MONOCYTES NFR BLD: 6 % (ref 5–13)
NEUTS SEG # BLD: 5.6 K/UL (ref 1.8–8)
NEUTS SEG NFR BLD: 75 % (ref 32–75)
NRBC # BLD: 0 K/UL (ref 0–0.01)
NRBC BLD-RTO: 0 PER 100 WBC
PLATELET # BLD AUTO: 127 K/UL (ref 150–400)
PMV BLD AUTO: 11.4 FL (ref 8.9–12.9)
POTASSIUM SERPL-SCNC: 3.4 MMOL/L (ref 3.5–5.1)
PROT SERPL-MCNC: 4.7 G/DL (ref 6.4–8.2)
RBC # BLD AUTO: 3.22 M/UL (ref 3.8–5.2)
SODIUM SERPL-SCNC: 141 MMOL/L (ref 136–145)
WBC # BLD AUTO: 7.5 K/UL (ref 3.6–11)

## 2023-12-16 PROCEDURE — 6370000000 HC RX 637 (ALT 250 FOR IP): Performed by: INTERNAL MEDICINE

## 2023-12-16 PROCEDURE — 99233 SBSQ HOSP IP/OBS HIGH 50: CPT | Performed by: INTERNAL MEDICINE

## 2023-12-16 PROCEDURE — 6370000000 HC RX 637 (ALT 250 FOR IP): Performed by: STUDENT IN AN ORGANIZED HEALTH CARE EDUCATION/TRAINING PROGRAM

## 2023-12-16 PROCEDURE — 6360000002 HC RX W HCPCS: Performed by: INTERNAL MEDICINE

## 2023-12-16 PROCEDURE — 1100000003 HC PRIVATE W/ TELEMETRY

## 2023-12-16 PROCEDURE — 36415 COLL VENOUS BLD VENIPUNCTURE: CPT

## 2023-12-16 PROCEDURE — 80053 COMPREHEN METABOLIC PANEL: CPT

## 2023-12-16 PROCEDURE — 85025 COMPLETE CBC W/AUTO DIFF WBC: CPT

## 2023-12-16 PROCEDURE — 2700000000 HC OXYGEN THERAPY PER DAY

## 2023-12-16 PROCEDURE — 2580000003 HC RX 258: Performed by: INTERNAL MEDICINE

## 2023-12-16 RX ADMIN — MIDODRINE HYDROCHLORIDE 5 MG: 5 TABLET ORAL at 17:19

## 2023-12-16 RX ADMIN — ONDANSETRON 4 MG: 2 INJECTION INTRAMUSCULAR; INTRAVENOUS at 09:38

## 2023-12-16 RX ADMIN — AMIODARONE HYDROCHLORIDE 200 MG: 200 TABLET ORAL at 08:50

## 2023-12-16 RX ADMIN — ACETAMINOPHEN 650 MG: 325 TABLET ORAL at 05:21

## 2023-12-16 RX ADMIN — Medication: at 22:29

## 2023-12-16 RX ADMIN — Medication: at 13:40

## 2023-12-16 RX ADMIN — PAROXETINE HYDROCHLORIDE 10 MG: 20 TABLET, FILM COATED ORAL at 08:49

## 2023-12-16 RX ADMIN — MIDODRINE HYDROCHLORIDE 5 MG: 5 TABLET ORAL at 08:50

## 2023-12-16 RX ADMIN — POTASSIUM BICARBONATE 40 MEQ: 782 TABLET, EFFERVESCENT ORAL at 08:49

## 2023-12-16 RX ADMIN — MIDODRINE HYDROCHLORIDE 5 MG: 5 TABLET ORAL at 13:40

## 2023-12-16 RX ADMIN — RIVAROXABAN 20 MG: 20 TABLET, FILM COATED ORAL at 08:50

## 2023-12-16 RX ADMIN — MEGESTROL ACETATE 40 MG: 40 TABLET ORAL at 08:51

## 2023-12-16 RX ADMIN — FUROSEMIDE 40 MG: 10 INJECTION, SOLUTION INTRAMUSCULAR; INTRAVENOUS at 08:49

## 2023-12-16 RX ADMIN — SODIUM CHLORIDE, PRESERVATIVE FREE 10 ML: 5 INJECTION INTRAVENOUS at 08:50

## 2023-12-16 RX ADMIN — SODIUM CHLORIDE, PRESERVATIVE FREE 10 ML: 5 INJECTION INTRAVENOUS at 21:30

## 2023-12-16 RX ADMIN — Medication: at 08:50

## 2023-12-16 NOTE — PROGRESS NOTES
INTERNAL MEDICINE PROGRESS NOTE    NAME:  Cheryl Wilkes   :   1950   MRN:   [id number]     Date/Time:  2023 7:52 AM  Subjective:   History:  Chart reviewed and patient seen and examined and D/W her nurse this AM and all events noted. She is followed by Dr. Chad Louise for Dementia, HTN, A fib, and depression as well as other medical problems. She has now been admitted with sepsis due to an UTI and Syncope felt due to orthostasis with acute AMS. This AM she is alert with some confusion. She to direct questioning denies cardiac or respiratory c/o and there are no GI/ c/o. She has no new neurologic c/o and no other new c/o on complete ROS.       Medications reviewed:  Current Facility-Administered Medications   Medication Dose Route Frequency    furosemide (LASIX) injection 40 mg  40 mg IntraVENous Daily    balsum peru-castor oil (VENELEX) ointment   Topical TID    megestrol (MEGACE) tablet 40 mg  40 mg Oral Daily    rivaroxaban (XARELTO) tablet 20 mg  20 mg Oral Daily with breakfast    potassium bicarb-citric acid (EFFER-K) effervescent tablet 40 mEq  40 mEq Oral Daily    midodrine (PROAMATINE) tablet 5 mg  5 mg Oral TID WC    loperamide (IMODIUM) capsule 2 mg  2 mg Oral 4x Daily PRN    PARoxetine (PAXIL) tablet 10 mg  10 mg Oral Daily    donepezil (ARICEPT) tablet 10 mg  10 mg Oral Nightly    sodium chloride flush 0.9 % injection 5-40 mL  5-40 mL IntraVENous 2 times per day    sodium chloride flush 0.9 % injection 5-40 mL  5-40 mL IntraVENous PRN    0.9 % sodium chloride infusion   IntraVENous PRN    ondansetron (ZOFRAN-ODT) disintegrating tablet 4 mg  4 mg Oral Q8H PRN    Or    ondansetron (ZOFRAN) injection 4 mg  4 mg IntraVENous Q6H PRN    polyethylene glycol (GLYCOLAX) packet 17 g  17 g Oral Daily PRN    acetaminophen (TYLENOL) tablet 650 mg  650 mg Oral Q6H PRN    Or    acetaminophen (TYLENOL) suppository 650 mg  650 mg Rectal Q6H PRN    amiodarone (CORDARONE) tablet 200 mg  200 mg Oral Daily

## 2023-12-17 ENCOUNTER — APPOINTMENT (OUTPATIENT)
Facility: HOSPITAL | Age: 73
DRG: 871 | End: 2023-12-17
Payer: MEDICARE

## 2023-12-17 LAB
ALBUMIN SERPL-MCNC: 1.6 G/DL (ref 3.5–5)
ALBUMIN/GLOB SERPL: 0.5 (ref 1.1–2.2)
ALP SERPL-CCNC: 131 U/L (ref 45–117)
ALT SERPL-CCNC: 104 U/L (ref 12–78)
ANION GAP SERPL CALC-SCNC: 1 MMOL/L (ref 5–15)
AST SERPL-CCNC: 167 U/L (ref 15–37)
BASOPHILS # BLD: 0 K/UL (ref 0–0.1)
BASOPHILS NFR BLD: 0 % (ref 0–1)
BILIRUB SERPL-MCNC: 1.3 MG/DL (ref 0.2–1)
BUN SERPL-MCNC: 22 MG/DL (ref 6–20)
BUN/CREAT SERPL: 48 (ref 12–20)
CALCIUM SERPL-MCNC: 8.6 MG/DL (ref 8.5–10.1)
CHLORIDE SERPL-SCNC: 98 MMOL/L (ref 97–108)
CO2 SERPL-SCNC: 40 MMOL/L (ref 21–32)
CREAT SERPL-MCNC: 0.46 MG/DL (ref 0.55–1.02)
DIFFERENTIAL METHOD BLD: ABNORMAL
EOSINOPHIL # BLD: 0.1 K/UL (ref 0–0.4)
EOSINOPHIL NFR BLD: 1 % (ref 0–7)
ERYTHROCYTE [DISTWIDTH] IN BLOOD BY AUTOMATED COUNT: 15.2 % (ref 11.5–14.5)
GLOBULIN SER CALC-MCNC: 3.1 G/DL (ref 2–4)
GLUCOSE SERPL-MCNC: 80 MG/DL (ref 65–100)
HCT VFR BLD AUTO: 29.3 % (ref 35–47)
HGB BLD-MCNC: 9.6 G/DL (ref 11.5–16)
IMM GRANULOCYTES # BLD AUTO: 0.1 K/UL (ref 0–0.04)
IMM GRANULOCYTES NFR BLD AUTO: 1 % (ref 0–0.5)
LYMPHOCYTES # BLD: 1 K/UL (ref 0.8–3.5)
LYMPHOCYTES NFR BLD: 12 % (ref 12–49)
MCH RBC QN AUTO: 30.9 PG (ref 26–34)
MCHC RBC AUTO-ENTMCNC: 32.8 G/DL (ref 30–36.5)
MCV RBC AUTO: 94.2 FL (ref 80–99)
MONOCYTES # BLD: 0.4 K/UL (ref 0–1)
MONOCYTES NFR BLD: 5 % (ref 5–13)
NEUTS SEG # BLD: 6.9 K/UL (ref 1.8–8)
NEUTS SEG NFR BLD: 81 % (ref 32–75)
NRBC # BLD: 0 K/UL (ref 0–0.01)
NRBC BLD-RTO: 0 PER 100 WBC
PLATELET # BLD AUTO: 101 K/UL (ref 150–400)
PMV BLD AUTO: 11.8 FL (ref 8.9–12.9)
POTASSIUM SERPL-SCNC: 4.3 MMOL/L (ref 3.5–5.1)
PROT SERPL-MCNC: 4.7 G/DL (ref 6.4–8.2)
RBC # BLD AUTO: 3.11 M/UL (ref 3.8–5.2)
SODIUM SERPL-SCNC: 139 MMOL/L (ref 136–145)
WBC # BLD AUTO: 8.5 K/UL (ref 3.6–11)

## 2023-12-17 PROCEDURE — 71045 X-RAY EXAM CHEST 1 VIEW: CPT

## 2023-12-17 PROCEDURE — 6360000002 HC RX W HCPCS: Performed by: INTERNAL MEDICINE

## 2023-12-17 PROCEDURE — 6370000000 HC RX 637 (ALT 250 FOR IP): Performed by: INTERNAL MEDICINE

## 2023-12-17 PROCEDURE — 80053 COMPREHEN METABOLIC PANEL: CPT

## 2023-12-17 PROCEDURE — 2580000003 HC RX 258: Performed by: INTERNAL MEDICINE

## 2023-12-17 PROCEDURE — 85025 COMPLETE CBC W/AUTO DIFF WBC: CPT

## 2023-12-17 PROCEDURE — 2700000000 HC OXYGEN THERAPY PER DAY

## 2023-12-17 PROCEDURE — 99233 SBSQ HOSP IP/OBS HIGH 50: CPT | Performed by: INTERNAL MEDICINE

## 2023-12-17 PROCEDURE — 1100000003 HC PRIVATE W/ TELEMETRY

## 2023-12-17 PROCEDURE — 6370000000 HC RX 637 (ALT 250 FOR IP): Performed by: STUDENT IN AN ORGANIZED HEALTH CARE EDUCATION/TRAINING PROGRAM

## 2023-12-17 PROCEDURE — 94640 AIRWAY INHALATION TREATMENT: CPT

## 2023-12-17 PROCEDURE — 36415 COLL VENOUS BLD VENIPUNCTURE: CPT

## 2023-12-17 RX ORDER — FUROSEMIDE 10 MG/ML
40 INJECTION INTRAMUSCULAR; INTRAVENOUS ONCE
Status: COMPLETED | OUTPATIENT
Start: 2023-12-17 | End: 2023-12-17

## 2023-12-17 RX ORDER — IPRATROPIUM BROMIDE AND ALBUTEROL SULFATE 2.5; .5 MG/3ML; MG/3ML
1 SOLUTION RESPIRATORY (INHALATION) ONCE
Status: COMPLETED | OUTPATIENT
Start: 2023-12-17 | End: 2023-12-17

## 2023-12-17 RX ADMIN — Medication: at 09:59

## 2023-12-17 RX ADMIN — MEGESTROL ACETATE 40 MG: 40 TABLET ORAL at 10:00

## 2023-12-17 RX ADMIN — FUROSEMIDE 40 MG: 10 INJECTION, SOLUTION INTRAMUSCULAR; INTRAVENOUS at 18:47

## 2023-12-17 RX ADMIN — MIDODRINE HYDROCHLORIDE 5 MG: 5 TABLET ORAL at 13:42

## 2023-12-17 RX ADMIN — Medication: at 22:53

## 2023-12-17 RX ADMIN — SODIUM CHLORIDE, PRESERVATIVE FREE 10 ML: 5 INJECTION INTRAVENOUS at 10:01

## 2023-12-17 RX ADMIN — PAROXETINE HYDROCHLORIDE 10 MG: 20 TABLET, FILM COATED ORAL at 10:01

## 2023-12-17 RX ADMIN — RIVAROXABAN 20 MG: 20 TABLET, FILM COATED ORAL at 10:01

## 2023-12-17 RX ADMIN — POTASSIUM BICARBONATE 40 MEQ: 782 TABLET, EFFERVESCENT ORAL at 09:58

## 2023-12-17 RX ADMIN — FUROSEMIDE 40 MG: 10 INJECTION, SOLUTION INTRAMUSCULAR; INTRAVENOUS at 10:00

## 2023-12-17 RX ADMIN — SODIUM CHLORIDE, PRESERVATIVE FREE 10 ML: 5 INJECTION INTRAVENOUS at 22:54

## 2023-12-17 RX ADMIN — DONEPEZIL HYDROCHLORIDE 10 MG: 5 TABLET, FILM COATED ORAL at 22:53

## 2023-12-17 RX ADMIN — MIDODRINE HYDROCHLORIDE 5 MG: 5 TABLET ORAL at 10:00

## 2023-12-17 RX ADMIN — AMIODARONE HYDROCHLORIDE 200 MG: 200 TABLET ORAL at 10:00

## 2023-12-17 RX ADMIN — Medication: at 13:42

## 2023-12-17 RX ADMIN — IPRATROPIUM BROMIDE AND ALBUTEROL SULFATE 1 DOSE: .5; 3 SOLUTION RESPIRATORY (INHALATION) at 16:39

## 2023-12-17 NOTE — PLAN OF CARE
Problem: Discharge Planning  Goal: Discharge to home or other facility with appropriate resources  Outcome: Progressing     Problem: Pain  Goal: Verbalizes/displays adequate comfort level or baseline comfort level  Outcome: Progressing  Flowsheets  Taken 12/17/2023 0755 by Joseph Bassett RN  Verbalizes/displays adequate comfort level or baseline comfort level: Encourage patient to monitor pain and request assistance  Taken 12/16/2023 1915 by Lori Webster RN  Verbalizes/displays adequate comfort level or baseline comfort level:   Encourage patient to monitor pain and request assistance   Assess pain using appropriate pain scale   Administer analgesics based on type and severity of pain and evaluate response   Implement non-pharmacological measures as appropriate and evaluate response   Notify Licensed Independent Practitioner if interventions unsuccessful or patient reports new pain     Problem: Safety - Adult  Goal: Free from fall injury  Outcome: Progressing

## 2023-12-17 NOTE — PROGRESS NOTES
Bedside and Verbal shift change report given to Edwar Galvan RN  (oncoming nurse) by RN (offgoing nurse). Report included the following information Nurse Handoff Report, Index, ED Encounter Summary, Intake/Output, MAR, Recent Results, and Cardiac Rhythm NSR .      1014: Maxwell Kyle MD notified of pts increased O2 requirement from 3 to 6 L NC to reach O2 sat of 90%. Pt drowsy, lung sounds coarse with wheezing/rhonchi. 1510Jakelle [place] MD contacted via telephone to update on pt. CXR ordered, Speech consulted, 1x DuoNeb ordered. 1520: Rapid RN at bedside to evaluate pt due to increasing O2 requirement. Pt sats 96% on 6 L NC.    1545: Bedside CXR in progress    1630: Respiratory therapist ay bedside, placing pt on mid-troy     1640: Sacral wound care completed. Updated wound images placed in chart. Purewick and brief removed d/t risk for further skin breakdown. Wound care RN re-consulted to re-evaluate wound. 1713Maxwell Kyle MD called and notified of pt being increased to 12 L Mid troy. 40 mg IV Lasix ordered, pt to be upgraded to stepdown level of care.

## 2023-12-17 NOTE — PROGRESS NOTES
James Score 14. All of the following interventions have been implemented to prevent pressure injury:    SKIN ASSESSMENT (S)  Dual skin assessment completed at shift change: yes  Name of second RN who completed Dual Skin Assessment: Estiven Chávez RN  Picture of wound uploaded to EMR: Yes  Venelex ordered and given per protocol: Yes  Wound care consulted if wounds present: Yes    SURFACE (S)  Naseem air pump or specialty bed ordered: Yes  Type of bed: air bed  Only white chux used with specialty surfaces (no green chux used): Yes  Waffle cushion used for chair positioning: bedrest    KEEP MOVING (K)  Mobility status (Bedrest, Chairbound, UWA x 1 assist , 2 assist, Max assist): 2 assist  Q2 hour turns documented: Yes  Refusals to turn and education provided documented: NA  Device used to float heels: heels up  PT/OT consulted: Yes    INCONTINENCE (I)  Incontinence status assessed Q2 hours: Yes  External catheter in use: no  Barrier cream in use: yes    NUTRITION (N)  I/O's documented every 8 hours: Yes  Oral supplements ordered if appropriate: Yes  Nutrition services consulted: Yes    All concerns about new DTI's must be escalated directly to attending MD, charge nurse, [address] [place] [address] and nurse director.

## 2023-12-17 NOTE — PROGRESS NOTES
INTERNAL MEDICINE PROGRESS NOTE    NAME:  Amador Carter   :   1950   MRN:   [id number]     Date/Time:  2023 5:58 AM  Subjective:   History:  Chart reviewed and patient seen and examined and D/W her nurse this AM and all events noted. She is followed by Dr. Sergio Perez for Dementia, HTN, A fib, and depression as well as other medical problems. She has now been admitted with sepsis due to an UTI and Syncope felt due to orthostasis with acute AMS. This AM she remains alert with some confusion. She to direct questioning denies cardiac or respiratory c/o and there are no GI/ c/o. She had some nausea but that has resolvedShe has no new neurologic c/o and no other new c/o on complete ROS.       Medications reviewed:  Current Facility-Administered Medications   Medication Dose Route Frequency    furosemide (LASIX) injection 40 mg  40 mg IntraVENous Daily    balsum peru-castor oil (VENELEX) ointment   Topical TID    megestrol (MEGACE) tablet 40 mg  40 mg Oral Daily    rivaroxaban (XARELTO) tablet 20 mg  20 mg Oral Daily with breakfast    potassium bicarb-citric acid (EFFER-K) effervescent tablet 40 mEq  40 mEq Oral Daily    midodrine (PROAMATINE) tablet 5 mg  5 mg Oral TID WC    loperamide (IMODIUM) capsule 2 mg  2 mg Oral 4x Daily PRN    PARoxetine (PAXIL) tablet 10 mg  10 mg Oral Daily    donepezil (ARICEPT) tablet 10 mg  10 mg Oral Nightly    sodium chloride flush 0.9 % injection 5-40 mL  5-40 mL IntraVENous 2 times per day    sodium chloride flush 0.9 % injection 5-40 mL  5-40 mL IntraVENous PRN    0.9 % sodium chloride infusion   IntraVENous PRN    ondansetron (ZOFRAN-ODT) disintegrating tablet 4 mg  4 mg Oral Q8H PRN    Or    ondansetron (ZOFRAN) injection 4 mg  4 mg IntraVENous Q6H PRN    polyethylene glycol (GLYCOLAX) packet 17 g  17 g Oral Daily PRN    acetaminophen (TYLENOL) tablet 650 mg  650 mg Oral Q6H PRN    Or    acetaminophen (TYLENOL) suppository 650 mg  650 mg Rectal Q6H PRN    amiodarone overhydration with fluids. Now on IV Lasix QD  SAMUEL: Improving. Today 22/0.84  Hypokalemia: Improved. But today 3.4 so replete   Elevated transaminases. Improving slowly. Holding statin--continue to stay off this for now. HTN, has been hypotensive: On Midodrine now. She has been given IV hydration as tolerated. BP meds held at admission. History of A fib: Continue Amiodarone and Xarelto -- Amiodarone decreased to 200. Cardiology, Dr. Zandra Neri, following; appreciate the assistance. Dementia: Continue Aricept. Depression: Continue Paxil. Metabolic Alkalosis with Bicarb 40 so may need Diamox  Discharge plan: SNF for inpatient PT, on hold following discussion with NOK    50 Minutes spent today in direct care of this high complexity patient with greater than 50% in counseling and coordination of care.     If need to contact me use hospital  [phone], DO NOT USE PERFECT SERVE    ___________________________________________________    Attending Physician: Vinicius Luo MD

## 2023-12-18 LAB
ALBUMIN SERPL-MCNC: 1.6 G/DL (ref 3.5–5)
ALBUMIN/GLOB SERPL: 0.5 (ref 1.1–2.2)
ALP SERPL-CCNC: 132 U/L (ref 45–117)
ALT SERPL-CCNC: 94 U/L (ref 12–78)
ANION GAP SERPL CALC-SCNC: 3 MMOL/L (ref 5–15)
AST SERPL-CCNC: 169 U/L (ref 15–37)
BASOPHILS # BLD: 0 K/UL (ref 0–0.1)
BASOPHILS NFR BLD: 0 % (ref 0–1)
BILIRUB SERPL-MCNC: 1.2 MG/DL (ref 0.2–1)
BUN SERPL-MCNC: 26 MG/DL (ref 6–20)
BUN/CREAT SERPL: 51 (ref 12–20)
CALCIUM SERPL-MCNC: 8.3 MG/DL (ref 8.5–10.1)
CHLORIDE SERPL-SCNC: 97 MMOL/L (ref 97–108)
CO2 SERPL-SCNC: 41 MMOL/L (ref 21–32)
CREAT SERPL-MCNC: 0.51 MG/DL (ref 0.55–1.02)
DIFFERENTIAL METHOD BLD: ABNORMAL
EOSINOPHIL # BLD: 0 K/UL (ref 0–0.4)
EOSINOPHIL NFR BLD: 0 % (ref 0–7)
ERYTHROCYTE [DISTWIDTH] IN BLOOD BY AUTOMATED COUNT: 15.1 % (ref 11.5–14.5)
GLOBULIN SER CALC-MCNC: 3 G/DL (ref 2–4)
GLUCOSE SERPL-MCNC: 86 MG/DL (ref 65–100)
HCT VFR BLD AUTO: 28.1 % (ref 35–47)
HGB BLD-MCNC: 9.1 G/DL (ref 11.5–16)
IMM GRANULOCYTES # BLD AUTO: 0.1 K/UL (ref 0–0.04)
IMM GRANULOCYTES NFR BLD AUTO: 1 % (ref 0–0.5)
LYMPHOCYTES # BLD: 1 K/UL (ref 0.8–3.5)
LYMPHOCYTES NFR BLD: 12 % (ref 12–49)
MCH RBC QN AUTO: 30.2 PG (ref 26–34)
MCHC RBC AUTO-ENTMCNC: 32.4 G/DL (ref 30–36.5)
MCV RBC AUTO: 93.4 FL (ref 80–99)
MONOCYTES # BLD: 0.6 K/UL (ref 0–1)
MONOCYTES NFR BLD: 6 % (ref 5–13)
NEUTS SEG # BLD: 7.1 K/UL (ref 1.8–8)
NEUTS SEG NFR BLD: 81 % (ref 32–75)
NRBC # BLD: 0 K/UL (ref 0–0.01)
NRBC BLD-RTO: 0 PER 100 WBC
PLATELET # BLD AUTO: 113 K/UL (ref 150–400)
PMV BLD AUTO: 11.3 FL (ref 8.9–12.9)
POTASSIUM SERPL-SCNC: 3.1 MMOL/L (ref 3.5–5.1)
PROT SERPL-MCNC: 4.6 G/DL (ref 6.4–8.2)
RBC # BLD AUTO: 3.01 M/UL (ref 3.8–5.2)
SODIUM SERPL-SCNC: 141 MMOL/L (ref 136–145)
WBC # BLD AUTO: 8.7 K/UL (ref 3.6–11)

## 2023-12-18 PROCEDURE — 99223 1ST HOSP IP/OBS HIGH 75: CPT | Performed by: INTERNAL MEDICINE

## 2023-12-18 PROCEDURE — 92610 EVALUATE SWALLOWING FUNCTION: CPT

## 2023-12-18 PROCEDURE — 6370000000 HC RX 637 (ALT 250 FOR IP): Performed by: INTERNAL MEDICINE

## 2023-12-18 PROCEDURE — 2700000000 HC OXYGEN THERAPY PER DAY

## 2023-12-18 PROCEDURE — 2580000003 HC RX 258: Performed by: INTERNAL MEDICINE

## 2023-12-18 PROCEDURE — 6370000000 HC RX 637 (ALT 250 FOR IP): Performed by: STUDENT IN AN ORGANIZED HEALTH CARE EDUCATION/TRAINING PROGRAM

## 2023-12-18 PROCEDURE — 99233 SBSQ HOSP IP/OBS HIGH 50: CPT | Performed by: INTERNAL MEDICINE

## 2023-12-18 PROCEDURE — 36415 COLL VENOUS BLD VENIPUNCTURE: CPT

## 2023-12-18 PROCEDURE — 80053 COMPREHEN METABOLIC PANEL: CPT

## 2023-12-18 PROCEDURE — 6360000002 HC RX W HCPCS: Performed by: INTERNAL MEDICINE

## 2023-12-18 PROCEDURE — 85025 COMPLETE CBC W/AUTO DIFF WBC: CPT

## 2023-12-18 PROCEDURE — 1100000003 HC PRIVATE W/ TELEMETRY

## 2023-12-18 RX ADMIN — SODIUM CHLORIDE, PRESERVATIVE FREE 10 ML: 5 INJECTION INTRAVENOUS at 20:16

## 2023-12-18 RX ADMIN — FUROSEMIDE 40 MG: 10 INJECTION, SOLUTION INTRAMUSCULAR; INTRAVENOUS at 10:36

## 2023-12-18 RX ADMIN — DONEPEZIL HYDROCHLORIDE 10 MG: 5 TABLET, FILM COATED ORAL at 20:15

## 2023-12-18 RX ADMIN — POTASSIUM BICARBONATE 40 MEQ: 782 TABLET, EFFERVESCENT ORAL at 10:36

## 2023-12-18 RX ADMIN — Medication: at 10:37

## 2023-12-18 RX ADMIN — Medication: at 20:15

## 2023-12-18 RX ADMIN — Medication: at 14:00

## 2023-12-18 RX ADMIN — PAROXETINE HYDROCHLORIDE 10 MG: 20 TABLET, FILM COATED ORAL at 10:36

## 2023-12-18 RX ADMIN — MEGESTROL ACETATE 40 MG: 40 TABLET ORAL at 10:45

## 2023-12-18 RX ADMIN — MIDODRINE HYDROCHLORIDE 5 MG: 5 TABLET ORAL at 10:36

## 2023-12-18 RX ADMIN — AMIODARONE HYDROCHLORIDE 200 MG: 200 TABLET ORAL at 10:36

## 2023-12-18 RX ADMIN — SODIUM CHLORIDE, PRESERVATIVE FREE 10 ML: 5 INJECTION INTRAVENOUS at 10:36

## 2023-12-18 NOTE — PROGRESS NOTES
EP/ ARRHYTHMIA Progress Note    Patient ID:  Patient: Jocelyn Kimble  MRN: [id number]  Age: 68 y.o.  : 1950    Date of  Admission: 2023 12:04 PM   PCP:  Eliana Pearson MD  Usual arrhythmia specialists:  Shobha Fonseca MD    Assessment:   Fainting likely due to orthostatic hypotension, confounded by her UTI. UTI with Klebsiella pnuemoniae. Treating. Atrial fibrillation with prior ablation. Was on propafenone prior, now on amiodarone. Elevated transaminases, unsure of the cause. Hypotension can be the cause of hepatic hypoperfusion, but still sorting this out. Digoxin level undetectable despite this as an OP medication. Hypokalemia. Fluid overload with interstitial pulmonary edema on CHEST X-RAY 12/10. Net IO Since Admission: 3,712. 62 mL [23 1548]    Patient Vitals for the past 96 hrs (Last 3 readings):   Weight   23 1200 62.9 kg (138 lb 10.7 oz)     . Chronic lymphedema. Hypoalbuminemia possibly due to poor nutrition. Anemia, probably multifactorial.   Full code. Plan:     Continue furosemide 40 mg IV daily. Continue anticoagulation with Xarelto. Continue amiodarone 200 mg daily. Agree with holding atorvastatin. Transaminases improving. NO urgency to rechallenge with lower dose atorvastatin, this can be done as an OP. No digoxin. Little benefit, high risk. Continue midodrine 5 mg po THREE TIMES A DAY. Nutrition team suggested considering an appetite stimulant. Started megestrol 40 mg daily here.  update:  Fluid balance is gradually improving. Renal function stable. K slightly low, agree with supplementation. [x]       High complexity decision making was performed in this patient    Jocelyn Kimble is a 68 y.o. female with a history of admission for symptomatic UTI. She denies CP, syncope, dizziness, palpitations, or edema. NO TIA or stroke like symptoms on my interview. She feels weak.   +nonproductive cough has

## 2023-12-18 NOTE — CONSULTS
Palliative Medicine  Patient Name: Stuart Javed  YOB: 1950  MRN: [id number]  Age: 68 y.o. Gender: female    Date of Initial Consult: 12  Date of Service: 12/18/2023  Time: 12:34 PM  Provider: oJse Zhu MD  Hospital Day: 13  Admit Date: 12/4/2023  Referring Provider: Kiarra Matute MD      Reasons for Consultation:  Goals of Care    HISTORY OF PRESENT ILLNESS (HPI):   Stuart Javed is a 68 y.o. female with a past medical history of ht, Atrial fibrillation, Depression, chronic lymphedema, dementai on aricept, who was admitted on 12/4/2023 from home with a diagnosis of sepsis, UTI, SAMUEL and hypotension. Hospital course:  She is s/p treatment of Klebsiella UTI with merepenem   Pulmonary edema due to possible underlying heart disease with worsening resp failure in last 24 hrs now  on 13 litre high flow, possibly due to underlying heart disease +/- overhydration with fluids. She is on daily lasix now. CXR shows pulmonary edema. Poor oral intake/Malnutrition : Albumin 1.6( 12/18/23)    Recent admission :    8/19-8/25/23: recurrent falls, recent intraarticular fx of distil end of left radius. 7/12/-7/19/23 : Dehydration, hyponatremia, SAMUEL. Psychosocial: : Patient is  single, retired nurse from [employer]. Her only child is Nain Hobbs, at baseline patient lives independently, does have chronic lymphedema, requires cane or walker when she has a flareup of lymphedema, Jenifer visit her three to 4 times a week, does grocery shopping, patient is basically home bound. Legal next of kin Jenifer Fields/daughter       PALLIATIVE DIAGNOSES:    Palliative care encounter  Deconditioning   Frailty with frequent falls . Fluid overload( CHF ). Malnutrition ( lowe albumin )  DNR discussion :      ASSESSMENT AND PLAN:   Prior to visit, I reviewed chart including lab results, MAR and radiology results.   Met with patient and her daughter Alla Valdivia( she is ultra sound tech at [employer] )  Patient is very

## 2023-12-18 NOTE — PROGRESS NOTES
[address] [facility]  [phone]    Nursing Note   Patient Name: Leslie Singer  YOB: 1950  Age: 68 y.o. [address] [facility] RN Note:      Referral received and hospice consult noted. Chart reviewed. Called and spoke with Jenifer/daughter. Hospice meeting has been arranged for tomorrow, 12/19, at 11:30am.  If there are any questions, please call the main [address] [place] [address] number [phone]. Thank you for the opportunity to be of service to this patient and her family.

## 2023-12-18 NOTE — WOUND CARE
Wound Care consult for \"changes in wound on sacrum / buttocks\":  Chart reviewed and patient assessed. Pt. Was seen on admission by the other wound care nurse and   staff took photos on admission. Skin photos yesterday:         Assessment today: Pt. Is bleeding from her wounds on the sacrum and buttocks. The DTI s on the thighs, buttocks and the right calf are purple and the skin is intact (not open). The sacrum is open / present on admission but now bigger. The wound needs to be packed and a foam dressing applied at all times. Complete off loading of the sacrum at all times. Air bed surface to circulate air to the skin. .   Treatment: Therahoney sheet to the open sacrum Pressure injury wound stage 3 off load the Sacrum more than just one pillow (fold the pillow). Manage the urinary incontinence with the PureWick suction device turned to a maximum of 60-80 mmHg suction. Insert between the legs gently to avoid skin injury. Wound care to the sacrum written today.    Tien Yoder RN, BSN, [employer]

## 2023-12-18 NOTE — PROGRESS NOTES
Brief note( full note to follow). Met with patient and her daughter Jenifer/legal next of kin. Patient is extremely weak, alert oriented x 3, daughter reports patient has very poor short-term memory. Patient is contemplating hospice and is agreeable to meet with hospice, we discussed malnutrition, low albumin is indicated of of poor prognosis along with other progressive chronic comorbidities. I will place a hospice consult. Patient currently wants to maintain a full CODE STATUS with the understanding that the burden of CPR outweighs the benefit, we decided to follow-up on further conversation this Wednesday at 6: 27 family meeting with her daughter and patient. Thank you for the opportunity to participate in the care of Kimber Moore.

## 2023-12-18 NOTE — PROGRESS NOTES
..End of Shift Note    Bedside shift change report given to Arnold Gonzalez RN (oncoming nurse) by Janice Mays RN (offgoing nurse). Report included the following information SBAR    Shift worked:  0700 - 1900     Shift summary and any significant changes:    Pt. Tolerated all medication w/pudding. Wound care nurse met with patient during day shift and placed orders. Palliative care met with daughter. Notes indicate that she will be placing a hospice order. Concerns for physician to address: No     Zone phone for oncoming shift:  No       Activity:     Number times ambulated in hallways past shift: 0  Number of times OOB to chair past shift: 0    Cardiac:   Cardiac Monitoring: Yes           Access:  Current line(s): PIV     Genitourinary:   Urinary status: external catheter    Respiratory:      Chronic home O2 use?: NO  Incentive spirometer at bedside: NO       GI:     Current diet:  ADULT DIET;  Dysphagia - Soft and Bite Sized  ADULT ORAL NUTRITION SUPPLEMENT; Breakfast, Lunch, Dinner; Standard High Calorie/High Protein Oral Supplement  Passing flatus: YES  Tolerating current diet: Patient has been having a poor appetite       Pain Management:   Patient states pain is manageable on current regimen: NO    Skin:     Interventions: float heels    Patient Safety:  Fall Score:    Interventions: bed/chair alarm       Length of Stay:  Expected LOS: 12  Actual LOS: 14      Janice Mays RN

## 2023-12-18 NOTE — PROGRESS NOTES
Physical therapy:    Chart reviewed and spoke with RN. Pt met with palliative medicine and a hospice consult has been placed. Pt declined therapy services at this time. Will defer and continue to follow. Thank you.     Ghassan Livingston, PT, DPT

## 2023-12-18 NOTE — PROGRESS NOTES
Occupational Therapy  Medical record reviewed and spoke with nurse who reports that hospice will be consulted. Upon arrival, pt declined therapy at this time. Will continue to follow as appropriate.

## 2023-12-18 NOTE — CARE COORDINATION
Transition of Care Plan:     RUR: 19%   Prior Level of Functioning: Independent   Disposition: SNF - [address] [facility] [facility] [address] [place]    Back up plan: [facility] (accepted)  If SNF or IPR: Date FOC offered: 12/6 [facility], 12/8 SNF  Date [address] received: 12/6 - no preference for [facility], 12/8 [facility]  Accepting facility: [address] [place] [address]  Date authorization started with reference number: 12/12/2023, Ref ID #: [id number]   Date authorization received and expires: 12/14/2023 to 12/16/2023, Abram Uribe ID# - [id number]. Follow up appointments: defer to SNF  DME needed: defer to SNF  Transportation at discharge: Family vs. Transport  IM/IMM Medicare/ letter given: 2nd IM needed prior to discharge. Is patient a [place] and connected with [facility]? No.              If yes, was [employer] transfer form completed and VA notified? N/A  Caregiver Contact: Violette DASR - [phone] - [email or web address]  Discharge Caregiver contacted prior to discharge? Patient to contact. Care Conference needed? No.  Barriers to discharge: medical clearance    [facility]. CM also spoke to [facility] to give her a heads up regarding the referral.     CM will follow and assist with d/c planning.     Brian Fang

## 2023-12-18 NOTE — PLAN OF CARE
Problem: Discharge Planning  Goal: Discharge to home or other facility with appropriate resources  12/18/2023 1104 by Uli Tesfaye RN  Outcome: Progressing  12/18/2023 0857 by Rosmery Ayon, RT  Outcome: Progressing     Problem: Pain  Goal: Verbalizes/displays adequate comfort level or baseline comfort level  Outcome: Progressing     Problem: Safety - Adult  Goal: Free from fall injury  Outcome: Progressing     Problem: Skin/Tissue Integrity  Goal: Absence of new skin breakdown  Description: 1. Monitor for areas of redness and/or skin breakdown  2. Assess vascular access sites hourly  3. Every 4-6 hours minimum:  Change oxygen saturation probe site  4. Every 4-6 hours:  If on nasal continuous positive airway pressure, respiratory therapy assess nares and determine need for appliance change or resting period. Outcome: Progressing     Problem: SLP Adult - Impaired Swallowing  Goal: By Discharge: Advance to least restrictive diet without signs or symptoms of aspiration for planned discharge setting. See evaluation for individualized goals. Description: Speech Pathology Goals  Initiated 12/18/2023     1. Patient will tolerate least restrictive diet without signs of aspiration or decline in respiratory status within 7 days.   12/18/2023 0943 by Destiny Muñoz, SLP  Outcome: Progressing

## 2023-12-19 ENCOUNTER — APPOINTMENT (OUTPATIENT)
Facility: HOSPITAL | Age: 73
DRG: 871 | End: 2023-12-19
Payer: MEDICARE

## 2023-12-19 PROBLEM — R53.81 PHYSICAL DECONDITIONING: Status: ACTIVE | Noted: 2023-12-19

## 2023-12-19 PROBLEM — R54 FRAILTY: Status: ACTIVE | Noted: 2023-12-19

## 2023-12-19 PROBLEM — Z78.9 FULL CODE STATUS: Status: ACTIVE | Noted: 2023-12-19

## 2023-12-19 LAB
ARTERIAL PATENCY WRIST A: ABNORMAL
BASE EXCESS BLDA CALC-SCNC: 15.8 MMOL/L
BDY SITE: ABNORMAL
GAS FLOW.O2 O2 DELIVERY SYS: 15 L/MIN
HCO3 BLDA-SCNC: 42 MMOL/L (ref 22–26)
PCO2 BLDA: 56 MMHG (ref 35–45)
PH BLDA: 7.49 (ref 7.35–7.45)
PO2 BLDA: 89 MMHG (ref 80–100)
SAO2 % BLD: 97 % (ref 92–97)
SAO2% DEVICE SAO2% SENSOR NAME: ABNORMAL
SPECIMEN SITE: ABNORMAL

## 2023-12-19 PROCEDURE — 2580000003 HC RX 258: Performed by: INTERNAL MEDICINE

## 2023-12-19 PROCEDURE — 6370000000 HC RX 637 (ALT 250 FOR IP): Performed by: INTERNAL MEDICINE

## 2023-12-19 PROCEDURE — 71045 X-RAY EXAM CHEST 1 VIEW: CPT

## 2023-12-19 PROCEDURE — 94761 N-INVAS EAR/PLS OXIMETRY MLT: CPT

## 2023-12-19 PROCEDURE — 6360000002 HC RX W HCPCS: Performed by: NURSE PRACTITIONER

## 2023-12-19 PROCEDURE — 82803 BLOOD GASES ANY COMBINATION: CPT

## 2023-12-19 PROCEDURE — 99233 SBSQ HOSP IP/OBS HIGH 50: CPT | Performed by: INTERNAL MEDICINE

## 2023-12-19 PROCEDURE — 6370000000 HC RX 637 (ALT 250 FOR IP): Performed by: STUDENT IN AN ORGANIZED HEALTH CARE EDUCATION/TRAINING PROGRAM

## 2023-12-19 PROCEDURE — 2060000000 HC ICU INTERMEDIATE R&B

## 2023-12-19 PROCEDURE — 2700000000 HC OXYGEN THERAPY PER DAY

## 2023-12-19 PROCEDURE — 36600 WITHDRAWAL OF ARTERIAL BLOOD: CPT

## 2023-12-19 RX ORDER — FUROSEMIDE 10 MG/ML
40 INJECTION INTRAMUSCULAR; INTRAVENOUS DAILY
Status: DISCONTINUED | OUTPATIENT
Start: 2023-12-20 | End: 2023-12-22

## 2023-12-19 RX ORDER — FUROSEMIDE 10 MG/ML
40 INJECTION INTRAMUSCULAR; INTRAVENOUS ONCE
Status: COMPLETED | OUTPATIENT
Start: 2023-12-19 | End: 2023-12-19

## 2023-12-19 RX ADMIN — Medication: at 10:35

## 2023-12-19 RX ADMIN — SODIUM CHLORIDE, PRESERVATIVE FREE 10 ML: 5 INJECTION INTRAVENOUS at 10:35

## 2023-12-19 RX ADMIN — AMIODARONE HYDROCHLORIDE 200 MG: 200 TABLET ORAL at 10:35

## 2023-12-19 RX ADMIN — ACETAMINOPHEN 650 MG: 325 TABLET ORAL at 00:45

## 2023-12-19 RX ADMIN — PAROXETINE HYDROCHLORIDE 10 MG: 20 TABLET, FILM COATED ORAL at 10:34

## 2023-12-19 RX ADMIN — MIDODRINE HYDROCHLORIDE 5 MG: 5 TABLET ORAL at 12:24

## 2023-12-19 RX ADMIN — MIDODRINE HYDROCHLORIDE 5 MG: 5 TABLET ORAL at 19:24

## 2023-12-19 RX ADMIN — SODIUM CHLORIDE, PRESERVATIVE FREE 10 ML: 5 INJECTION INTRAVENOUS at 20:51

## 2023-12-19 RX ADMIN — FUROSEMIDE 40 MG: 10 INJECTION, SOLUTION INTRAMUSCULAR; INTRAVENOUS at 13:34

## 2023-12-19 RX ADMIN — RIVAROXABAN 20 MG: 20 TABLET, FILM COATED ORAL at 10:35

## 2023-12-19 RX ADMIN — MIDODRINE HYDROCHLORIDE 5 MG: 5 TABLET ORAL at 10:34

## 2023-12-19 RX ADMIN — Medication: at 13:35

## 2023-12-19 RX ADMIN — Medication: at 20:51

## 2023-12-19 RX ADMIN — MEGESTROL ACETATE 40 MG: 40 TABLET ORAL at 10:35

## 2023-12-19 RX ADMIN — DONEPEZIL HYDROCHLORIDE 10 MG: 5 TABLET, FILM COATED ORAL at 23:00

## 2023-12-19 RX ADMIN — POTASSIUM BICARBONATE 40 MEQ: 782 TABLET, EFFERVESCENT ORAL at 10:35

## 2023-12-19 RX ADMIN — ACETAMINOPHEN 650 MG: 325 TABLET ORAL at 12:24

## 2023-12-19 NOTE — PROGRESS NOTES
tremor. Awake and appropriate but tired. CARDIOGRAPHICS and STUDIES, I reviewed:    Telemetry:  SINUS RHYTHM. ECG:  On admission, sinus ella 56 with nonspecific repolarization abnormality but probably due to amiodarone. Qtc 553 msec which is prolonged due to amio. Echo:  Left Ventricle Normal left ventricular systolic function with a visually estimated EF of 55 - 60%. Left ventricle size is normal. Normal wall thickness. Normal wall motion. Indeterminate diastolic function. Left Atrium Left atrium is mildly dilated. Right Ventricle Right ventricle size is normal. Normal systolic function. Right Atrium Right atrium size is normal.   Aortic Valve Valve structure is normal. Mild regurgitation. No stenosis. Mitral Valve Valve structure is normal. Mild regurgitation. No stenosis noted. Tricuspid Valve Valve structure is normal. Mild regurgitation. No stenosis noted. The estimated RVSP is 28 mmHg. Pulmonic Valve The pulmonic valve was not well visualized. Valve structure is normal. No regurgitation. No stenosis noted. Aorta Normal sized aortic root. Pericardium No pericardial effusion. Labs:  No results for input(s): \"CPK\", \"CKMB\" in the last 72 hours. Invalid input(s): \"CPKMB\", \"CKNDX\", \"TROIQ\"  Lab Results   Component Value Date/Time    CHOL 154 10/20/2022 12:04 PM    HDL 58 10/20/2022 12:04 PM     No results for input(s): \"INR\", \"APTT\" in the last 72 hours. Invalid input(s): \"PTP\"   Recent Labs     12/17/23 0413 12/18/23  0406    141   K 4.3 3.1*   CL 98 97   CO2 40* 41*   BUN 22* 26*   WBC 8.5 8.7   HGB 9.6* 9.1*   HCT 29.3* 28.1*   * 113*       Recent Labs     12/17/23 0413 12/18/23  0406   GLOB 3.1 3.0       Estimated Creatinine Clearance: 87 mL/min (A) (based on SCr of 0.51 mg/dL (L)). No components found for: \"GLPOC\"  No results for input(s): \"PH\", \"PCO2\", \"PO2\" in the last 72 hours.       Elfego Simon MD 12/19/23 2:09 PM

## 2023-12-19 NOTE — PROGRESS NOTES
James Score 14. All of the following interventions have been implemented to prevent pressure injury:    SKIN ASSESSMENT (S)  Dual skin assessment completed at shift change: Yes  Name of second RN who completed Dual Skin Assessment: Tu Dc RN  Picture of wound uploaded to EMR: Yes  Venelex ordered and given per protocol: Yes  Wound care consulted if wounds present: Yes    SURFACE (S)  Naseem air pump or specialty bed ordered: Yes  Type of bed: naseem with pump  Only white chux used with specialty surfaces (no green chux used): Yes  Waffle cushion used for chair positioning: bedrest    KEEP MOVING (K)  Mobility status (Bedrest, Chairbound, UWA x 1 assist , 2 assist, Max assist): 2 assist  Q2 hour turns documented: Yes  Refusals to turn and education provided documented: NA  Device used to float heels: heels up  PT/OT consulted: Yes    INCONTINENCE (I)  Incontinence status assessed Q2 hours: Yes  External catheter in use: yes  Barrier cream in use: yes    NUTRITION (N)  I/O's documented every 8 hours: Yes  Oral supplements ordered if appropriate: Yes  Nutrition services consulted: Yes    All concerns about new DTI's must be escalated directly to attending MD, charge nurse, [address] [place] [address] and nurse director.

## 2023-12-19 NOTE — PROGRESS NOTES
Bedside shift change report given to Sulaiman Ryan RN (oncoming nurse) by Yeison Gabriel  (offgoing nurse). Report included the following information Nurse Handoff Report, Index, ED Encounter Summary, ED SBAR, Adult Overview, Surgery Report, Intake/Output, MAR, Recent Results, Med Rec Status, Cardiac Rhythm NSR, and Alarm Parameters. James Score 14. All of the following interventions have been implemented to prevent pressure injury:    SKIN ASSESSMENT (S)  Dual skin assessment completed at shift change: Yes  Name of second RN who completed Dual Skin Assessment: Sunday Mcbride RN  Picture of wound uploaded to EMR: Yes  Venelex ordered and given per protocol: Yes  Wound care consulted if wounds present: Yes    SURFACE (S)  Oriska air pump or specialty bed ordered: Yes  Type of bed:   Only white chux used with specialty surfaces (no green chux used): Yes  Waffle cushion used for chair positioning: Yes    KEEP MOVING (K)  Mobility status (Bedrest, Chairbound, UWA x 1 assist , 2 assist, Max assist): 1  Q2 hour turns documented: Yes  Refusals to turn and education provided documented: Yes  Device used to float heels: Cushion/ pillows  PT/OT consulted: Yes    INCONTINENCE (I)  Incontinence status assessed Q2 hours: Yes  External catheter in use: yes  Barrier cream in use: yes    NUTRITION (N)  I/O's documented every 8 hours: Yes  Oral supplements ordered if appropriate: Yes  Nutrition services consulted: Yes    All concerns about new DTI's must be escalated directly to attending MD, charge nurse, [address] [place] [address] and nurse director.

## 2023-12-19 NOTE — PROGRESS NOTES
Bedside shift change report given to Leatha Haney RN (oncoming nurse) by Ryan Bran RN  (offgoing nurse). Report included the following information Nurse Handoff Report, Index, ED Encounter Summary, ED SBAR, Adult Overview, Surgery Report, Intake/Output, MAR, Recent Results, Med Rec Status, Cardiac Rhythm NSR, and Alarm Parameters.

## 2023-12-19 NOTE — PROGRESS NOTES
Bedside and Verbal shift change report given to Stephen Watkins RN  (oncoming nurse) by Ronal Galvan RN (offgoing nurse). Report included the following information Nurse Handoff Report, Index, ED Encounter Summary, Intake/Output, MAR, Recent Results, and Cardiac Rhythm NSR .      0940: Pt crying out, went to bedside to assess pt. Pt found with nasal cannula out of nose, eyes rolled back, full body tremors, and no verbal response. O2 73% on RA before nasal cannula replaced. Rapid response called overhead, pt increased to 15 L [place]. Pt recovered well on 15 L, responded to questions and followed commands, O2 increased to mid 90s. CXR and ABGs ordered. 6520: ABGs drawn, CXR in progress. 1002: Jorden Chandra MD notified of rapid response and ABG results. Requested pt be upgraded to stepdown level of care, waiting for order. 1132: Hospice RN at bedside with pt and family. End of Shift Note    Bedside shift change report given to RN (oncoming nurse) by Stephen Watkins RN (offgoing nurse). Report included the following information SBAR, Kardex, ED Summary, Intake/Output, MAR, Recent Results, and Cardiac Rhythm NSR    Shift worked:  6715-2729     Shift summary and any significant changes:     See note above    Pt switched to DNR code satus    Pt & family to have meeting with palliative 12/20/23     Concerns for physician to address:       Zone phone for oncoming shift:          Activity:     Number times ambulated in hallways past shift: 0  Number of times OOB to chair past shift: 0    Cardiac:   Cardiac Monitoring: Yes           Access:  Current line(s): PIV     Genitourinary:   Urinary status: voiding, incontinent, and external catheter    Respiratory:      Chronic home O2 use?: NO  Incentive spirometer at bedside: NO       GI:     Current diet:  ADULT DIET;  Dysphagia - Soft and Bite Sized  ADULT ORAL NUTRITION SUPPLEMENT; Breakfast, Lunch, Dinner; Standard High Calorie/High Protein Oral Supplement  Passing flatus: YES  Tolerating current diet: decreased appaetite       Pain Management:   Patient states pain is manageable on current regimen: YES    Skin:     Interventions: specialty bed, float heels, increase time out of bed, foam dressing, PT/OT consult, limit briefs, and internal/external urinary devices    Patient Safety:  Fall Score:    Interventions: bed/chair alarm, assistive device (walker, cane.  etc), gripper socks, pt to call before getting OOB, and stay with me (per policy)       Length of Stay:  Expected LOS: 18  Actual LOS: [address] Chavez, RN

## 2023-12-19 NOTE — PROGRESS NOTES
INTERNAL MEDICINE ATTENDING NOTE     Patient Name: Savannah Babcock   : 1950   Admit: 2023      ASSESSMENT / PLAN    Sepsis due to UTI: She is on empiric antibiotics; cultures showing ESBL Klebsiella. This has been treated with a course of Meropenem. Syncope: Likely due to orthostatic hypotension. Respiratory failure with hypoxemia: On oxygen. Pulmonary edema, possibly due to underlying heart disease +/- overhydration with fluids. She is on daily lasix now. SAMUEL: Improving. Matabolic alkalosis. Hypokalemia: Improved. Elevated transaminases. Improving. Holding statin--continue to stay off this for now. HTN, has been hypotensive: On midodrine now. She has been given IV hydration as tolerated. BP meds held at admission. History of A fib: Continue amiodarone and xarelto--amiodarone decreased to 200. Cardiology, Dr. Bharathi Snyder, following; appreciate the assistance. Dementia: Continue Aricept. Depression: Continue paxil. Mobilize with PT. Protein calorie malnutrition: Dietary consult to assist with nutrition. DNR. Palliative care consulted. Discharge plan: TBD. Addendum: Rapid response team called was later in the morning; discussed with Dr. Jose Wiseman. It appears that her nasal oxygen was inadvertently removed, and she became hypoxic and poorly responsive. Addendum: At around 2 pm I called her daughter to review her case. We discussed her lack of improvement, despite care. If things were to deteriorate further, resuscitative efforts would very likely be futile, and would cause a lot of misery. The patient and family are on the verge of electing hospice, which seems entirely appropriate. Geronimo Farris MD, FACP  Best contact is via pager by hospital  at [phone]. I am also on [employer]. SUBJECTIVE:   Ms. Savannah Babcock is a patient of mine with PMH of Afib, HTN, depression and dementia, who was admitted for sepsis due to UTI.  She was seen by

## 2023-12-19 NOTE — SIGNIFICANT EVENT
RAPID RESPONSE TEAM    Overhead rapid response paged to room #2120 at 0932    Reason for rapid response:  hypoxia    Initial assessment:  Pt lethargic, breathing labored, able to answer questions and follow commands. Initial spo2 80's, quickly rising to 90's on 15L midflow. Per primary RN, she heard pt cry out, pt had full-body shaking and her eyes were rolled back. Pt found hypoxic with nasal cannula off; pt placed back on nasal cannula, increased to 15L, and rapid response called. At this time, pt alert and oriented, spo2 94% on 15L midflow, states she does not remember shaking/crying out. BE FAST negative. Dr. Daysi Reynoso at bedside, orders received for the following Interventions: chest xray, ABG    Outcome:  pt to remain in room #2120     Please call with any questions or concerns    Spencer Zamora. Jacobo Davila RN  Rapid Response Team  Ext [phone]    No results found for this or any previous visit (from the past 8 hour(s)).

## 2023-12-19 NOTE — PROGRESS NOTES
Rapid response called, advised by RN pt's nasal cannula had come out of her nose and she had desatted, RN placed nasal cannula back in nose and increased to 15lpm, and sats begain in to increase.  ABG Drawn, and results called back to Rapid Response RN

## 2023-12-19 NOTE — PROGRESS NOTES
Physical Therapy:  Patient with rapid response called earlier this AM and requiring increase O2. Met with hospice and plans to meet with palliative tomorrow. Will defer PT session today due to medical instability and will wait to see decisions made tomorrow after the palliative meeting.      Radha Mccormick PT, DPT

## 2023-12-19 NOTE — PROGRESS NOTES
EP/ ARRHYTHMIA Progress Note    Patient ID:  Patient: Joe Amezcua  MRN: [id number]  Age: 68 y.o.  : 1950    Date of  Admission: 2023 12:04 PM   PCP:  Gunner Gaines MD  Usual arrhythmia specialists:  Malissa Vasquez MD    Assessment:   Fainting likely due to orthostatic hypotension, confounded by her UTI. UTI with Klebsiella pnuemoniae. Treating. Atrial fibrillation with prior ablation. Was on propafenone prior, now on amiodarone. Elevated transaminases, unsure of the cause. Hypotension can be the cause of hepatic hypoperfusion, but still sorting this out. Digoxin level undetectable despite this as an OP medication. Hypokalemia. Fluid overload with interstitial pulmonary edema on CHEST X-RAY 12/10. Net IO Since Admission: 3,162. 62 mL [23 1210]    Patient Vitals for the past 96 hrs (Last 3 readings):   Weight   23 0300 62.5 kg (137 lb 12.6 oz)   23 1200 62.9 kg (138 lb 10.7 oz)       . Chronic lymphedema. Hypoalbuminemia possibly due to poor nutrition. Anemia, probably multifactorial.   Full code. Plan:     Continue furosemide 40 mg IV daily. Continue anticoagulation with Xarelto. Continue amiodarone 200 mg daily. Agree with holding atorvastatin. Transaminases improving. NO urgency to rechallenge with lower dose atorvastatin, this can be done as an OP. No digoxin. Little benefit, high risk. Continue midodrine 5 mg po THREE TIMES A DAY. Nutrition team suggested considering an appetite stimulant. Started megestrol 40 mg daily here.  update:  Fluid balance is gradually improving. Renal function stable. K slightly low, agree with supplementation.  update: RRT for pulling nasal cannula off. Now on 13L high flow. Repleting K+. Family meeting today. I discussed plan with daughter and RN. Lasix 40 IV now.  Order has falling off but still has some fluid to give      [x]       High complexity decision making was performed appropriate but tired. CARDIOGRAPHICS and STUDIES, I reviewed:    Telemetry:  SINUS RHYTHM. ECG:  On admission, sinus ella 56 with nonspecific repolarization abnormality but probably due to amiodarone. Qtc 553 msec which is prolonged due to amio. Echo:  Left Ventricle Normal left ventricular systolic function with a visually estimated EF of 55 - 60%. Left ventricle size is normal. Normal wall thickness. Normal wall motion. Indeterminate diastolic function. Left Atrium Left atrium is mildly dilated. Right Ventricle Right ventricle size is normal. Normal systolic function. Right Atrium Right atrium size is normal.   Aortic Valve Valve structure is normal. Mild regurgitation. No stenosis. Mitral Valve Valve structure is normal. Mild regurgitation. No stenosis noted. Tricuspid Valve Valve structure is normal. Mild regurgitation. No stenosis noted. The estimated RVSP is 28 mmHg. Pulmonic Valve The pulmonic valve was not well visualized. Valve structure is normal. No regurgitation. No stenosis noted. Aorta Normal sized aortic root. Pericardium No pericardial effusion. Labs:  No results for input(s): \"CPK\", \"CKMB\" in the last 72 hours. Invalid input(s): \"CPKMB\", \"CKNDX\", \"TROIQ\"  Lab Results   Component Value Date/Time    CHOL 154 10/20/2022 12:04 PM    HDL 58 10/20/2022 12:04 PM     No results for input(s): \"INR\", \"APTT\" in the last 72 hours. Invalid input(s): \"PTP\"   Recent Labs     12/17/23  0413 12/18/23  0406    141   K 4.3 3.1*   CL 98 97   CO2 40* 41*   BUN 22* 26*   WBC 8.5 8.7   HGB 9.6* 9.1*   HCT 29.3* 28.1*   * 113*       Recent Labs     12/17/23  0413 12/18/23  0406   GLOB 3.1 3.0       Estimated Creatinine Clearance: 87 mL/min (A) (based on SCr of 0.51 mg/dL (L)). No components found for: \"GLPOC\"  No results for input(s): \"PH\", \"PCO2\", \"PO2\" in the last 72 hours.       DYLON Church NP 12/19/23 12:10 PM

## 2023-12-19 NOTE — PROGRESS NOTES
Hospice Liaison Note: met with daughter and pt at bedside and discussed events of this hospitalization. Pt had removed NC earlier and de-satted into the 70's on 15L. Prior to me entering the room, daughter and pt had discussed code status and made the decision for DNR. Notified bedside RN and palliative medicine team NP, Sherrie Learn of this change. Spoke with dtr at length about increasing oxygen requirements, she is clear that her mother would not want to be intubated if it escalated to that point. Dtr wanting clinical update from MD to clarify clinical status, she last spoke with him on Friday. Dtr reports pt has been declining every day since admission; explained that pt not likely to leave the hospital given her oxygen requirements and how quickly she desaturated. We talked about comfort care and what that would look like, opioids for air hunger and no escalation of o2. Dtr has goals of care meeting with palliative medicine physician, Dr. Jesus Garcia tomorrow at 12:30. Hospice will follow up with palliative after meeting tomorrow.

## 2023-12-19 NOTE — PLAN OF CARE
Problem: Discharge Planning  Goal: Discharge to home or other facility with appropriate resources  12/19/2023 0830 by Edwar Galvan RN  Outcome: Progressing  12/19/2023 0425 by Lidia Jackson RN  Outcome: Progressing  Flowsheets (Taken 12/18/2023 1910)  Discharge to home or other facility with appropriate resources: Identify barriers to discharge with patient and caregiver     Problem: Pain  Goal: Verbalizes/displays adequate comfort level or baseline comfort level  12/19/2023 0830 by Edwar Galvan RN  Outcome: Progressing  Flowsheets (Taken 12/19/2023 0755)  Verbalizes/displays adequate comfort level or baseline comfort level: Encourage patient to monitor pain and request assistance  12/19/2023 0425 by Lidia Jackson RN  Outcome: Progressing     Problem: Safety - Adult  Goal: Free from fall injury  12/19/2023 0830 by Edwar Galvan RN  Outcome: Progressing  12/19/2023 0425 by Lidia Jackson RN  Outcome: Progressing  Flowsheets (Taken 12/18/2023 1910)  Free From Fall Injury: Instruct family/caregiver on patient safety

## 2023-12-19 NOTE — PLAN OF CARE
Problem: Discharge Planning  Goal: Discharge to home or other facility with appropriate resources  Outcome: Progressing  Flowsheets (Taken 12/18/2023 1910)  Discharge to home or other facility with appropriate resources: Identify barriers to discharge with patient and caregiver     Problem: Pain  Goal: Verbalizes/displays adequate comfort level or baseline comfort level  Outcome: Progressing     Problem: Safety - Adult  Goal: Free from fall injury  Outcome: Progressing  Flowsheets (Taken 12/18/2023 1910)  Free From Fall Injury: Instruct family/caregiver on patient safety     Problem: Skin/Tissue Integrity  Goal: Absence of new skin breakdown  Description: 1. Monitor for areas of redness and/or skin breakdown  2. Assess vascular access sites hourly  3. Every 4-6 hours minimum:  Change oxygen saturation probe site  4. Every 4-6 hours:  If on nasal continuous positive airway pressure, respiratory therapy assess nares and determine need for appliance change or resting period.   Outcome: Progressing     Problem: Metabolic/Fluid and Electrolytes - Adult  Goal: Electrolytes maintained within normal limits  Outcome: Progressing  Flowsheets (Taken 12/18/2023 1910)  Electrolytes maintained within normal limits: Monitor labs and assess patient for signs and symptoms of electrolyte imbalances     Problem: Hematologic - Adult  Goal: Maintains hematologic stability  Outcome: Progressing  Flowsheets (Taken 12/18/2023 1910)  Maintains hematologic stability: Assess for signs and symptoms of bleeding or hemorrhage     Problem: Neurosensory - Adult  Goal: Achieves stable or improved neurological status  Outcome: Progressing  Flowsheets (Taken 12/18/2023 1910)  Achieves stable or improved neurological status: Assess for and report changes in neurological status     Problem: Respiratory - Adult  Goal: Achieves optimal ventilation and oxygenation  12/19/2023 0425 by Nelson Norris RN  Outcome: Progressing  12/19/2023 0022 by

## 2023-12-20 PROBLEM — Z71.89 GOALS OF CARE, COUNSELING/DISCUSSION: Status: ACTIVE | Noted: 2023-06-15

## 2023-12-20 PROBLEM — R41.0 DELIRIUM: Status: ACTIVE | Noted: 2023-12-20

## 2023-12-20 LAB
ALBUMIN SERPL-MCNC: 1.8 G/DL (ref 3.5–5)
ALBUMIN/GLOB SERPL: 0.5 (ref 1.1–2.2)
ALP SERPL-CCNC: 149 U/L (ref 45–117)
ALT SERPL-CCNC: 80 U/L (ref 12–78)
ANION GAP SERPL CALC-SCNC: 4 MMOL/L (ref 5–15)
AST SERPL-CCNC: 100 U/L (ref 15–37)
BASOPHILS # BLD: 0.2 K/UL (ref 0–0.1)
BASOPHILS NFR BLD: 1 % (ref 0–1)
BILIRUB SERPL-MCNC: 1.6 MG/DL (ref 0.2–1)
BUN SERPL-MCNC: 34 MG/DL (ref 6–20)
BUN/CREAT SERPL: 44 (ref 12–20)
CALCIUM SERPL-MCNC: 8.4 MG/DL (ref 8.5–10.1)
CHLORIDE SERPL-SCNC: 97 MMOL/L (ref 97–108)
CO2 SERPL-SCNC: 40 MMOL/L (ref 21–32)
CREAT SERPL-MCNC: 0.78 MG/DL (ref 0.55–1.02)
DIFFERENTIAL METHOD BLD: ABNORMAL
EOSINOPHIL # BLD: 0.2 K/UL (ref 0–0.4)
EOSINOPHIL NFR BLD: 1 % (ref 0–7)
ERYTHROCYTE [DISTWIDTH] IN BLOOD BY AUTOMATED COUNT: 15.1 % (ref 11.5–14.5)
GLOBULIN SER CALC-MCNC: 3.7 G/DL (ref 2–4)
GLUCOSE SERPL-MCNC: 118 MG/DL (ref 65–100)
HCT VFR BLD AUTO: 32 % (ref 35–47)
HGB BLD-MCNC: 10.4 G/DL (ref 11.5–16)
IMM GRANULOCYTES # BLD AUTO: 0.2 K/UL (ref 0–0.04)
IMM GRANULOCYTES NFR BLD AUTO: 1 % (ref 0–0.5)
LYMPHOCYTES # BLD: 0.5 K/UL (ref 0.8–3.5)
LYMPHOCYTES NFR BLD: 3 % (ref 12–49)
MCH RBC QN AUTO: 30.6 PG (ref 26–34)
MCHC RBC AUTO-ENTMCNC: 32.5 G/DL (ref 30–36.5)
MCV RBC AUTO: 94.1 FL (ref 80–99)
MONOCYTES # BLD: 0.8 K/UL (ref 0–1)
MONOCYTES NFR BLD: 5 % (ref 5–13)
NEUTS SEG # BLD: 13.2 K/UL (ref 1.8–8)
NEUTS SEG NFR BLD: 89 % (ref 32–75)
NRBC # BLD: 0 K/UL (ref 0–0.01)
NRBC BLD-RTO: 0 PER 100 WBC
PLATELET # BLD AUTO: 149 K/UL (ref 150–400)
PMV BLD AUTO: 11.2 FL (ref 8.9–12.9)
POTASSIUM SERPL-SCNC: 3 MMOL/L (ref 3.5–5.1)
PROT SERPL-MCNC: 5.5 G/DL (ref 6.4–8.2)
RBC # BLD AUTO: 3.4 M/UL (ref 3.8–5.2)
RBC MORPH BLD: ABNORMAL
SODIUM SERPL-SCNC: 141 MMOL/L (ref 136–145)
WBC # BLD AUTO: 15.1 K/UL (ref 3.6–11)

## 2023-12-20 PROCEDURE — 2700000000 HC OXYGEN THERAPY PER DAY

## 2023-12-20 PROCEDURE — 6370000000 HC RX 637 (ALT 250 FOR IP): Performed by: INTERNAL MEDICINE

## 2023-12-20 PROCEDURE — 80053 COMPREHEN METABOLIC PANEL: CPT

## 2023-12-20 PROCEDURE — 6360000002 HC RX W HCPCS: Performed by: INTERNAL MEDICINE

## 2023-12-20 PROCEDURE — 85025 COMPLETE CBC W/AUTO DIFF WBC: CPT

## 2023-12-20 PROCEDURE — 99232 SBSQ HOSP IP/OBS MODERATE 35: CPT | Performed by: INTERNAL MEDICINE

## 2023-12-20 PROCEDURE — 2580000003 HC RX 258: Performed by: INTERNAL MEDICINE

## 2023-12-20 PROCEDURE — 2060000000 HC ICU INTERMEDIATE R&B

## 2023-12-20 PROCEDURE — 36415 COLL VENOUS BLD VENIPUNCTURE: CPT

## 2023-12-20 PROCEDURE — 6370000000 HC RX 637 (ALT 250 FOR IP): Performed by: STUDENT IN AN ORGANIZED HEALTH CARE EDUCATION/TRAINING PROGRAM

## 2023-12-20 PROCEDURE — 99233 SBSQ HOSP IP/OBS HIGH 50: CPT | Performed by: INTERNAL MEDICINE

## 2023-12-20 RX ADMIN — SODIUM CHLORIDE, PRESERVATIVE FREE 10 ML: 5 INJECTION INTRAVENOUS at 09:27

## 2023-12-20 RX ADMIN — MIDODRINE HYDROCHLORIDE 5 MG: 5 TABLET ORAL at 13:19

## 2023-12-20 RX ADMIN — Medication: at 18:47

## 2023-12-20 RX ADMIN — MIDODRINE HYDROCHLORIDE 5 MG: 5 TABLET ORAL at 18:47

## 2023-12-20 RX ADMIN — SODIUM CHLORIDE, PRESERVATIVE FREE 10 ML: 5 INJECTION INTRAVENOUS at 20:29

## 2023-12-20 RX ADMIN — POTASSIUM BICARBONATE 40 MEQ: 782 TABLET, EFFERVESCENT ORAL at 09:27

## 2023-12-20 RX ADMIN — Medication: at 20:29

## 2023-12-20 RX ADMIN — LOPERAMIDE HYDROCHLORIDE 2 MG: 2 CAPSULE ORAL at 05:42

## 2023-12-20 RX ADMIN — Medication: at 09:27

## 2023-12-20 RX ADMIN — AMIODARONE HYDROCHLORIDE 200 MG: 200 TABLET ORAL at 09:26

## 2023-12-20 RX ADMIN — FUROSEMIDE 40 MG: 10 INJECTION, SOLUTION INTRAMUSCULAR; INTRAVENOUS at 11:30

## 2023-12-20 RX ADMIN — PAROXETINE HYDROCHLORIDE 10 MG: 20 TABLET, FILM COATED ORAL at 09:26

## 2023-12-20 RX ADMIN — MEGESTROL ACETATE 40 MG: 40 TABLET ORAL at 09:27

## 2023-12-20 RX ADMIN — DONEPEZIL HYDROCHLORIDE 10 MG: 5 TABLET, FILM COATED ORAL at 20:29

## 2023-12-20 RX ADMIN — RIVAROXABAN 20 MG: 20 TABLET, FILM COATED ORAL at 09:27

## 2023-12-20 RX ADMIN — ONDANSETRON 4 MG: 4 TABLET, ORALLY DISINTEGRATING ORAL at 05:42

## 2023-12-20 RX ADMIN — MIDODRINE HYDROCHLORIDE 5 MG: 5 TABLET ORAL at 09:26

## 2023-12-20 NOTE — CARE COORDINATION
Transition of Care Plan:     RUR: 18% (moderate RUR)  Prior Level of Functioning: Independent; now needing assistance  Disposition: Family considering comfort care/hospice options    Previously discharge plans were SNF at [facility] and [facility] as a back up plan; patient may not be stable enough for these options at this time. If SNF or IPR: [facility]  Date FOC received: 12/6 - [facility], [facility]  Accepting facility: [facility]  Date authorization started with reference number: Auth needed  Date authorization received and expires: TBD  Follow up appointments: defer to SNF  DME needed: defer to SNF  Transportation at discharge: May not be needed if inpatient services are provided vs. [facility]  IM/IMM Medicare/ letter given: 2nd IM needed prior to discharge. Is patient a  and connected with [facility]? No.              If yes, was [employer] transfer form completed and [facility] notified? N/A  Caregiver Contact: Thee Wing - DTR - [phone] - [email or web address]  Discharge Caregiver contacted prior to discharge? Patient to contact. Care Conference needed?  No.  Barriers to discharge: pending clinical progress/family decisions, on 12L O2      NALDO Delarosa, RN    Care Management  [phone]

## 2023-12-20 NOTE — PLAN OF CARE
Problem: Discharge Planning  Goal: Discharge to home or other facility with appropriate resources  Outcome: Progressing  Flowsheets (Taken 12/19/2023 1900)  Discharge to home or other facility with appropriate resources: Identify discharge learning needs (meds, wound care, etc)     Problem: Pain  Goal: Verbalizes/displays adequate comfort level or baseline comfort level  Outcome: Progressing     Problem: Safety - Adult  Goal: Free from fall injury  Outcome: Progressing  Flowsheets (Taken 12/19/2023 1900)  Free From Fall Injury: Instruct family/caregiver on patient safety     Problem: Skin/Tissue Integrity  Goal: Absence of new skin breakdown  Description: 1. Monitor for areas of redness and/or skin breakdown  2. Assess vascular access sites hourly  3. Every 4-6 hours minimum:  Change oxygen saturation probe site  4. Every 4-6 hours:  If on nasal continuous positive airway pressure, respiratory therapy assess nares and determine need for appliance change or resting period.   Outcome: Progressing     Problem: Metabolic/Fluid and Electrolytes - Adult  Goal: Electrolytes maintained within normal limits  Outcome: Progressing  Flowsheets (Taken 12/19/2023 1900)  Electrolytes maintained within normal limits: Monitor response to electrolyte replacements, including repeat lab results as appropriate  Goal: Hemodynamic stability and optimal renal function maintained  Outcome: Progressing  Flowsheets (Taken 12/19/2023 1900)  Hemodynamic stability and optimal renal function maintained: Monitor intake, output and patient weight     Problem: Hematologic - Adult  Goal: Maintains hematologic stability  Outcome: Progressing  Flowsheets (Taken 12/19/2023 1900)  Maintains hematologic stability: Assess for signs and symptoms of bleeding or hemorrhage     Problem: Neurosensory - Adult  Goal: Achieves stable or improved neurological status  Outcome: Progressing  Flowsheets (Taken 12/19/2023 1900)  Achieves stable or improved

## 2023-12-20 NOTE — PROGRESS NOTES
James Score 12. All of the following interventions have been implemented to prevent pressure injury:    SKIN ASSESSMENT (S)  Dual skin assessment completed at shift change: Yes  Name of second RN who completed Dual Skin Assessment: Elen Lewis RN  Picture of wound uploaded to EMR: Yes  Venelex ordered and given per protocol: Yes  Wound care consulted if wounds present: Yes    SURFACE (S)  Farrell air pump or specialty bed ordered: Yes  Type of bed: Farrell with air pump  Only white chux used with specialty surfaces (no green chux used): Yes  Waffle cushion used for chair positioning: NA    KEEP MOVING (K)  Mobility status (Bedrest, Chairbound, UWA x 1 assist , 2 assist, Max assist): 2 assist  Q2 hour turns documented: Yes  Refusals to turn and education provided documented: NA  Device used to float heels: heels up  PT/OT consulted: Yes    INCONTINENCE (I)  Incontinence status assessed Q2 hours: Yes  External catheter in use: no, internal, garland  Barrier cream in use: yes    NUTRITION (N)  I/O's documented every 8 hours: Yes  Oral supplements ordered if appropriate: Yes  Nutrition services consulted: Yes    All concerns about new DTI's must be escalated directly to attending MD, charge nurse, [address] [place] [address] and nurse director.

## 2023-12-20 NOTE — PLAN OF CARE
Problem: Pain  Goal: Verbalizes/displays adequate comfort level or baseline comfort level  12/20/2023 1235 by Nino Cho RN  Outcome: Progressing  Flowsheets (Taken 12/20/2023 1106)  Verbalizes/displays adequate comfort level or baseline comfort level: Encourage patient to monitor pain and request assistance  12/20/2023 0809 by Michaelle Espinosa RN  Outcome: Progressing  Flowsheets (Taken 12/20/2023 0740 by Nino Cho RN)  Verbalizes/displays adequate comfort level or baseline comfort level: Encourage patient to monitor pain and request assistance     Problem: Safety - Adult  Goal: Free from fall injury  12/20/2023 1235 by Nino Cho RN  Outcome: Progressing  12/20/2023 0809 by Michaelle Espinosa RN  Outcome: Progressing  Flowsheets (Taken 12/19/2023 1900)  Free From Fall Injury: Instruct family/caregiver on patient safety

## 2023-12-20 NOTE — PROGRESS NOTES
Bedside shift change report given to Maynor Patel Dr RN (oncoming nurse) by Francia Mora RN (offgoing nurse).  Report included the following information Nurse Handoff Report, Index, ED Encounter Summary, ED SBAR, Adult Overview, Surgery Report, Intake/Output, MAR, Recent Results, Med Rec Status, Cardiac Rhythm NSR, and Alarm Parameters

## 2023-12-20 NOTE — PROGRESS NOTES
Palliative Medicine  Per H&P: Padilla Marinelli is a 68 y.o. female with a history of Afib, HTN, depression and dementia who was brought in by EMS for AMS and weakness. Patient lives at home alone but her daughter checks in on her daily, either in person or by camera / phone. Yesterday, the daughter checked the home cameras and noted that the patient went to bed early at around 6pm, something not very unusual.  This morning the patient was not on her recliner which was definitely unusual so the daughter tried to call but the patient was not answering. Daughter then came over and found the patient half in bed with her legs over the edge like she could not get up and slumped backwards. EMS was dispatched and patient found with sepsi, UTI, SAMUEL and low BP. HM was asked to admit. Palliative consult is for Goals of Care. Code Status: DNR    [facility]/19/2023     2:36 PM   Demographics   Marital Status Single   Gladys Pascal is her only child so as legal nok is her primary HCDM. Patient / Family Encounter Documentation    Participants (names): Padilla Marinelli, (alert, confused, unable to participate), Gladys Pascal, daughter, Dr. Leroy Alfaro, LCSW    Narrative: Palliative team met with patient and daughter at bedside. Dr. Marissa Steinberg provided medical update and LCSW provided calm, caring presence, empathetic listening, normalized, validated daughter's feelings of sadness and anxiety as she processes her mother's condition and is charged with making decisions about her care. She expressed understanding of her mother's condition and her role as primary HCDM. She is considering transitioning to comfort-focused care on , depending on how the next few days go. We will continue to follow along to provide education and support. Psychosocial Issues Identified/ Resilience Factors:   Patient is  single, retired nurse from [employer]. Her only child is Gladys Pascal.  Prior to this

## 2023-12-20 NOTE — PROGRESS NOTES
Comprehensive Nutrition Assessment    Type and Reason for Visit:  Reassess    Nutrition Recommendations/Plan:   Continue current diet and supplements  Please document % meals and supplements consumed in flowsheet I/O's under intake   Provide encouragement at meals     Malnutrition Assessment:  Malnutrition Status:  Severe malnutrition (12/06/23 1611)    Context:  Chronic Illness     Findings of the 6 clinical characteristics of malnutrition:  Energy Intake:  75% or less estimated energy requirements for 1 month or longer  Weight Loss:  Greater than 20% over 1 year     Body Fat Loss:  Severe body fat loss (Moderate) Orbital, Triceps   Muscle Mass Loss:  Severe muscle mass loss (Moderate) Temples (temporalis), Clavicles (pectoralis & deltoids), Hand (interosseous), Scapula (trapezius)  Fluid Accumulation:  Severe Extremities, Generalized   Strength:  Not Performed    Nutrition Assessment:     Chart reviewed. Pt continues with very poor PO intake and increased lethargy today. S/p palliative meeting with family considering comfort measures. Goals are no escalation of care. Possible transition to comfort over the weekend. Will follow up pending plan of care. Patient Vitals for the past 120 hrs:   PO Meals Eaten (%)   12/20/23 0822 1 - 25%   12/19/23 1930 1 - 25%   12/19/23 1149 1 - 25%   12/19/23 0755 1 - 25%   12/17/23 1515 0%   12/17/23 1100 1 - 25%   12/17/23 0755 0%   12/15/23 1448 0%     Wt Readings from Last 5 Encounters:   12/19/23 62.5 kg (137 lb 12.6 oz)   11/02/23 74.6 kg (164 lb 6.4 oz)   08/16/23 85.3 kg (188 lb)   07/12/23 90.3 kg (199 lb)   06/14/23 88.7 kg (195 lb 9.6 oz)   ]    Nutrition Related Findings:    Labs: K 3.0. Meds: lasix, megace, effer-K, imodium, zofran. Edema: 1+ generalized, trace BUE, 2+ pitting RLE, 1+ pitting LLE. BM 12/19.    Wound Type: Stage I (L heel), Stage II (R heel), Pressure Injury       Current Nutrition Intake & Therapies:    Average Meal Intake: 0%,

## 2023-12-20 NOTE — PROGRESS NOTES
Bedside and Verbal shift change report given to Leatha Haney RN  (oncoming nurse) by Dana Nugent RN (offgoing nurse). Report included the following information Nurse Handoff Report, Index, ED Encounter Summary, Intake/Output, MAR, Recent Results, and Cardiac Rhythm NSR .      1145: Palliative team at bedside meeting with pt and family. End of Shift Note    Bedside shift change report given to Yasemin Dc RN (oncoming nurse) by Leatha Haney RN (offgoing nurse). Report included the following information SBAR, Kardex, ED Summary, Intake/Output, MAR, Recent Results, and Cardiac Rhythm NSR    Shift worked:  [id number]     Shift summary and any significant changes:     Pt still requiring mid troy 12-15 L, desats when turning     Concerns for physician to address:       Zone phone for oncoming shift:          Activity:     Number times ambulated in hallways past shift: 0  Number of times OOB to chair past shift: 0    Cardiac:   Cardiac Monitoring: Yes           Access:  Current line(s): PIV     Genitourinary:   Urinary status: voiding and incontinent    Respiratory:      Chronic home O2 use?: NO  Incentive spirometer at bedside: NO       GI:     Current diet:  ADULT DIET;  Dysphagia - Soft and Bite Sized  ADULT ORAL NUTRITION SUPPLEMENT; Breakfast, Lunch, Dinner; Standard High Calorie/High Protein Oral Supplement  Passing flatus: YES  Tolerating current diet: YES       Pain Management:   Patient states pain is manageable on current regimen: YES    Skin:     Interventions: specialty bed, float heels, increase time out of bed, foam dressing, PT/OT consult, limit briefs, internal/external urinary devices, and nutritional support    Patient Safety:  Fall Score:    Interventions: bed/chair alarm, pt to call before getting OOB, and stay with me (per policy)       Length of Stay:  Expected LOS: 18  Actual LOS: 16      Leatha Haney RN

## 2023-12-20 NOTE — PROGRESS NOTES
Chart reviewed, pt discussed during interdisciplinary rounds. Pt with several days of progressive decline in medical status and unable to actively participate in skilled intervention as a result. Noted that family is likely transitioning pt to comfort care/hospice over the weekend once family/friends visit for goodbye. Will complete PT order at this time.  Thank you    Merriam Carrel, PT, DPT

## 2023-12-20 NOTE — PROGRESS NOTES
Brief note /follow up visit( full note to follow). Patient is lethargic waxing and waning progressive decline, not able to participate in discussion, lacks mental capacity to make medical decision. Met with daughter Soledad Blanco, goals are no escalation of care ( no lab work, no pressor ors, no BIPAP support ), patient is DNAR and DNI. Family want to wait few days, for transition to comfort care most likely over this weekend, to allow family/friends to visit for good bye. Plan coordinated with bed side RN Waldo Richard.

## 2023-12-20 NOTE — PROGRESS NOTES
Bedside shift change report given to Denise Holm RN (oncoming nurse) by Bob Davalos RN (offgoing nurse). Report included the following information Nurse Handoff Report, Index, ED Encounter Summary, ED SBAR, Adult Overview, Surgery Report, Intake/Output, MAR, Recent Results, Med Rec Status, Cardiac Rhythm NSR, and Alarm Parameters. James Score 14. All of the following interventions have been implemented to prevent pressure injury:    SKIN ASSESSMENT (S)  Dual skin assessment completed at shift change: Yes  Name of second RN who completed Dual Skin Assessment: Bob Davalos RN  Picture of wound uploaded to EMR: Yes  Venelex ordered and given per protocol: Yes  Wound care consulted if wounds present: Yes    SURFACE (S)  Naseem air pump or specialty bed ordered: Yes  Type of bed:   Only white chux used with specialty surfaces (no green chux used): Yes  Waffle cushion used for chair positioning: Yes    KEEP MOVING (K)  Mobility status (Bedrest, Chairbound, UWA x 1 assist , 2 assist, Max assist): 1  Q2 hour turns documented: Yes  Refusals to turn and education provided documented: Yes  Device used to float heels: Pillows/ Cushion  PT/OT consulted: Yes    INCONTINENCE (I)  Incontinence status assessed Q2 hours: Yes  External catheter in use: yes  Barrier cream in use: yes    NUTRITION (N)  I/O's documented every 8 hours: Yes  Oral supplements ordered if appropriate: Yes  Nutrition services consulted: Yes    All concerns about new DTI's must be escalated directly to attending MD, charge nurse, [address] [place] [address] and nurse director.

## 2023-12-20 NOTE — PROGRESS NOTES
Speech pathology  Chart reviewed, spoke with Palliative MD as she exit room following meeting. MD reporting lethargy and advised against SLP session at this time. Family still contemplating Hospice. Patient remains on a diet; however, recommend holding PO if too lethargic for safe PO acceptance. Will follow along for now until [address] [place] clarified.      Thanks,     Shannon Morales M.S. CCC-SLP

## 2023-12-20 NOTE — PROGRESS NOTES
Occupational Therapy     Chart reviewed, pt discussed during interdisciplinary rounds. Pt with several days of progressive decline in medical status and unable to actively participate in skilled intervention as a result. Noted that family is likely transitioning pt to comfort care/hospice over the weekend once family/friends visit for goodbye. Will complete OT order at this time.  Thank you    [place] [employer], OTD, OTR/L

## 2023-12-20 NOTE — PROGRESS NOTES
INTERNAL MEDICINE ATTENDING NOTE     Patient Name: Cheryl Wilkes   : 1950   Admit: 2023      ASSESSMENT / PLAN    Sepsis due to UTI: She is on empiric antibiotics; cultures showing ESBL Klebsiella. This has been treated with a course of Meropenem. Syncope: Likely due to orthostatic hypotension. Respiratory failure with hypoxemia: On oxygen. Pulmonary edema, possibly due to underlying heart disease +/- overhydration with fluids. She is on daily lasix now. SAMUEL: Improving. Matabolic alkalosis. Hypokalemia: Improved. Elevated transaminases. Improving. Holding statin--continue to stay off this for now. HTN, has been hypotensive: On midodrine now. She has been given IV hydration as tolerated. BP meds held at admission. History of A fib: Continue amiodarone and xarelto--amiodarone decreased to 200. Cardiology, Dr. Emily Noe, following; appreciate the assistance. Dementia: Continue Aricept. Depression: Continue paxil. Mobilize with PT. Protein calorie malnutrition: Dietary consult to assist with nutrition. DNR. Palliative care consulted. Discharge plan: TBD. Wally Nance MD, FACP  Best contact is via pager by hospital  at [phone]. I am also on Perfect[employer]. SUBJECTIVE:   Ms. Cheryl Wilkes is a patient of mine with PMH of Afib, HTN, depression and dementia, who was admitted for sepsis due to UTI. She was seen by me today during rounds. She has developed pulmonary edema and is now on daily lasix. She is requiring oxygen, reduced to 10 lpm this morning--she had rapid response call on  due to hypoxia because her nasal oxygen had been removed. She is now DNR, and she and family are contemplating hospice/comfort care. At this time, she is awake and alert. She continues to have some cough and congestion; currently on oxygen. The patient has no new complaints today. Remains weak.  ROS otherwise unremarkable except as noted

## 2023-12-20 NOTE — PROGRESS NOTES
EP/ ARRHYTHMIA Progress Note    Patient ID:  Patient: Jose Sung  MRN: [id number]  Age: 68 y.o.  : 1950    Date of  Admission: 2023 12:04 PM   PCP:  Alivia Bradley MD  Usual arrhythmia specialists:  Remi Lomeli MD    Assessment:   Fainting likely due to orthostatic hypotension, confounded by her UTI. UTI with Klebsiella pnuemoniae. Treating. Atrial fibrillation with prior ablation. Was on propafenone prior, now on amiodarone. Elevated transaminases, unsure of the cause. Hypotension can be the cause of hepatic hypoperfusion, but still sorting this out. Digoxin level undetectable despite this as an OP medication. Hypokalemia. Fluid overload with interstitial pulmonary edema on CHEST X-RAY 12/10. Net IO Since Admission: 3,162. 62 mL [23 1349]    Patient Vitals for the past 96 hrs (Last 3 readings):   Weight   23 0300 62.5 kg (137 lb 12.6 oz)   23 1200 62.9 kg (138 lb 10.7 oz)       Estimated Creatinine Clearance: 57 mL/min (based on SCr of 0.78 mg/dL). .  Chronic lymphedema. Hypoalbuminemia possibly due to poor nutrition. Anemia, probably multifactorial.   DNR. Plan:     Continue furosemide 40 mg IV daily. Continue anticoagulation with Xarelto. Continue amiodarone 200 mg daily. Agree with holding atorvastatin. Transaminases improving. NO urgency to rechallenge with lower dose atorvastatin, this can be done as an OP. No digoxin. Little benefit, high risk. Continue midodrine 5 mg po THREE TIMES A DAY. Nutrition team suggested considering an appetite stimulant. Started megestrol 40 mg daily here.  update:  Fluid balance is gradually improving. Renal function stable. K slightly low, agree with supplementation.  update:  Per NP, RRT called for pulling nasal cannula off. On 13L high flow. Repleting K+. Lasix 40 IV now and then daily. Hospice has been consulted.   Her code status will change to DNR per the components found for: \"GLPOC\"  No results for input(s): \"PH\", \"PCO2\", \"PO2\" in the last 72 hours.       Lexy Vera MD 12/20/23 1:49 PM

## 2023-12-21 PROCEDURE — 6370000000 HC RX 637 (ALT 250 FOR IP): Performed by: INTERNAL MEDICINE

## 2023-12-21 PROCEDURE — 2700000000 HC OXYGEN THERAPY PER DAY

## 2023-12-21 PROCEDURE — 2580000003 HC RX 258: Performed by: INTERNAL MEDICINE

## 2023-12-21 PROCEDURE — 6360000002 HC RX W HCPCS: Performed by: INTERNAL MEDICINE

## 2023-12-21 PROCEDURE — 2060000000 HC ICU INTERMEDIATE R&B

## 2023-12-21 PROCEDURE — 6370000000 HC RX 637 (ALT 250 FOR IP): Performed by: STUDENT IN AN ORGANIZED HEALTH CARE EDUCATION/TRAINING PROGRAM

## 2023-12-21 PROCEDURE — 99233 SBSQ HOSP IP/OBS HIGH 50: CPT | Performed by: INTERNAL MEDICINE

## 2023-12-21 RX ADMIN — MIDODRINE HYDROCHLORIDE 5 MG: 5 TABLET ORAL at 08:20

## 2023-12-21 RX ADMIN — RIVAROXABAN 20 MG: 20 TABLET, FILM COATED ORAL at 08:20

## 2023-12-21 RX ADMIN — Medication: at 20:46

## 2023-12-21 RX ADMIN — SODIUM CHLORIDE, PRESERVATIVE FREE 10 ML: 5 INJECTION INTRAVENOUS at 20:44

## 2023-12-21 RX ADMIN — MIDODRINE HYDROCHLORIDE 5 MG: 5 TABLET ORAL at 16:28

## 2023-12-21 RX ADMIN — Medication: at 08:21

## 2023-12-21 RX ADMIN — POTASSIUM BICARBONATE 40 MEQ: 782 TABLET, EFFERVESCENT ORAL at 08:19

## 2023-12-21 RX ADMIN — DONEPEZIL HYDROCHLORIDE 10 MG: 5 TABLET, FILM COATED ORAL at 20:45

## 2023-12-21 RX ADMIN — MIDODRINE HYDROCHLORIDE 5 MG: 5 TABLET ORAL at 11:53

## 2023-12-21 RX ADMIN — AMIODARONE HYDROCHLORIDE 200 MG: 200 TABLET ORAL at 08:20

## 2023-12-21 RX ADMIN — FUROSEMIDE 40 MG: 10 INJECTION, SOLUTION INTRAMUSCULAR; INTRAVENOUS at 08:19

## 2023-12-21 RX ADMIN — Medication: at 14:29

## 2023-12-21 RX ADMIN — PAROXETINE HYDROCHLORIDE 10 MG: 20 TABLET, FILM COATED ORAL at 08:20

## 2023-12-21 RX ADMIN — MEGESTROL ACETATE 40 MG: 40 TABLET ORAL at 08:20

## 2023-12-21 NOTE — PROGRESS NOTES
Palliative Medicine  Per H&P: Sandi Fernandez is a 68 y.o. female with a history of Afib, HTN, depression and dementia who was brought in by EMS for AMS and weakness. Patient lives at home alone but her daughter checks in on her daily, either in person or by camera / phone. Yesterday, the daughter checked the home cameras and noted that the patient went to bed early at around 6pm, something not very unusual.  This morning the patient was not on her recliner which was definitely unusual so the daughter tried to call but the patient was not answering. Daughter then came over and found the patient half in bed with her legs over the edge like she could not get up and slumped backwards. EMS was dispatched and patient found with sepsi, UTI, SAMUEL and low BP. HM was asked to admit. Palliative consult is for Goals of Care. Code Status: DNR     [facility]/19/2023     2:36 PM   Demographics   Marital Status Single   Carmen Glover is her only child so as legal nok is her primary HCDM. Patient / Family Encounter Documentation     Participants (names): Sandi Fernandez, (alert, confused, unable to participate), Carmen Glover, daughter, Dr. Beth Castillo, LCSW     Narrative: Palliative team met with daughter at bedside of patient, who is more lethargic today, with some restlessness. Dr. Anne Ricks is adjusting medication for symptoms. This writer stayed with daughter to provide additional emotional support and answered some of her questions about what to expect after patient passes and she was given some resources for cremation at her request. She was also referred to CM/Nurse leader for any other questions. LCSW affirmed daughter's care for her mother and her ability to balance being at bedside with keeping commitment at her daughter's school today for a craft. This writer normalized and validated the strain of navigating between commitments at this time.  Encouraged her to English  [place] [facility]) where your feet

## 2023-12-21 NOTE — PROGRESS NOTES
Speech pathology  Chart reviewed, spoke with RN. Patient continues with lethargy, currently on 10L HHFNC and poor tolerance with desats with turning and cleaning. Rn reports patient took meds crushed, coughing with thins, minimal intake overall. Family still considering comfort care/Hospice but goals are for no escalation of care. Certainly risk of aspiration is high given lethargy and tenuous respiratory status. Will complete orders at this time; however, if anything changes, please re-consult.   Mathew Cosby M.S. CCC-SLP

## 2023-12-21 NOTE — PROGRESS NOTES
1900 Bedside and Verbal shift change report given to [employer] Roque (oncoming nurse) by Etta Rosales RN (offgoing nurse). Report included the following information Nurse Handoff Report, MAR, Recent Results, and Cardiac Rhythm NSR . James Score 12. All of the following interventions have been implemented to prevent pressure injury:    SKIN ASSESSMENT (S)  Dual skin assessment completed at shift change: Yes  Name of second RN who completed Dual Skin Assessment: Etta Rosales RN  Picture of wound uploaded to EMR: Yes  Venelex ordered and given per protocol: Yes  Wound care consulted if wounds present: Yes    KEEP MOVING (K)  Mobility status (Bedrest, Chairbound, UWA x 1 assist , 2 assist, Max assist): Max  Q2 hour turns documented: Yes  Refusals to turn and education provided documented: Yes  Device used to float heels:    PT/OT consulted: Yes    INCONTINENCE (I)  Incontinence status assessed Q2 hours: Yes  External catheter in use: Ultrasorb  Barrier cream in use: Venelex    NUTRITION (N)  I/O's documented every 8 hours: Yes  Oral supplements ordered if appropriate: Yes  Nutrition services consulted: Yes    All concerns about new DTI's must be escalated directly to attending MD, charge nurse, [address] [place] [address] and nurse director. End of Shift Note    Bedside shift change report given to Kandace SZYMANSKI (oncoming nurse) by Trever Torres RN (offgoing nurse). Report included the following information SBAR, MAR, Recent Results, and Cardiac Rhythm NSR    Shift worked:  7pm-7am     Shift summary and any significant changes:      Kept comfortable and needs attended. Pt still requiring mid troy 12-15 L, desats when turning and cleaning.    Concerns for physician to address:        Zone phone for oncoming shift:           Activity:     Number times ambulated in hallways past shift: 0  Number of times OOB to chair past shift: 0    Cardiac:   Cardiac Monitoring: Yes           Access:  Current line(s): PIV     Genitourinary:   Urinary status: voiding

## 2023-12-21 NOTE — PROGRESS NOTES
INTERNAL MEDICINE ATTENDING NOTE     Patient Name: Clovis Min   : 1950   Admit: 2023      ASSESSMENT / PLAN    Sepsis due to UTI: She is on empiric antibiotics; cultures showing ESBL Klebsiella. This has been treated with a course of Meropenem. Syncope: Likely due to orthostatic hypotension. Respiratory failure with hypoxemia: On oxygen. Pulmonary edema, possibly due to underlying heart disease +/- overhydration with fluids. She is on daily lasix now. SAMUEL: Improving. Matabolic alkalosis. Hypokalemia: Improved. Elevated transaminases. Improving. Holding statin--continue to stay off this for now. HTN, has been hypotensive: On midodrine now. She has been given IV hydration as tolerated. BP meds held at admission. History of A fib: Continue amiodarone and xarelto--amiodarone decreased to 200. Cardiology, Dr. Darrel Camara, following; appreciate the assistance. Dementia: Continue Aricept. Depression: Continue paxil. Mobilize with PT. Protein calorie malnutrition: Dietary consult to assist with nutrition. DNR. Palliative care consulted. Transition to hospice is under consideration. Discharge plan: TBD. Jose Alfredo Prajapati MD, FACP  Best contact is via pager by hospital  at [phone]. I am also on PerfectS[employer]. SUBJECTIVE:   Ms. Clovis Min is a patient of mine with PMH of Afib, HTN, depression and dementia, who was admitted for sepsis due to UTI. She was seen by me today during rounds. She has developed pulmonary edema and is now on daily lasix. She is requiring oxygen, reduced to 10 lpm this morning--she had rapid response call on  due to hypoxia because her nasal oxygen had been removed. She is now DNR, and she and family are contemplating hospice/comfort care. At this time, she is awake and alert. She continues to have some cough and congestion; currently on oxygen. The patient has no new complaints today. Remains weak.  ROS otherwise

## 2023-12-21 NOTE — PLAN OF CARE
care: Monitor swallowing and airway patency with patient fatigue and changes in neurological status     Problem: Respiratory - Adult  Goal: Achieves optimal ventilation and oxygenation  12/20/2023 2126 by Hakeem Bearden RN  Outcome: Progressing  12/20/2023 0824 by RT Christopher  Outcome: Progressing  12/20/2023 0809 by Moshe Javed RN  Outcome: Progressing  Flowsheets  Taken 12/20/2023 0740 by Chela Rooney RN  Achieves optimal ventilation and oxygenation: Assess for changes in respiratory status  Taken 12/19/2023 1900 by Moshe Javed RN  Achieves optimal ventilation and oxygenation: Assess for changes in respiratory status     Problem: Cardiovascular - Adult  Goal: Maintains optimal cardiac output and hemodynamic stability  12/20/2023 2126 by Hakeem Bearden RN  Outcome: Progressing  Flowsheets (Taken 12/20/2023 1915)  Maintains optimal cardiac output and hemodynamic stability: Monitor blood pressure and heart rate  12/20/2023 0809 by Moshe Javed RN  Outcome: Progressing  Flowsheets  Taken 12/20/2023 0740 by Chela Rooney RN  Maintains optimal cardiac output and hemodynamic stability: Monitor blood pressure and heart rate  Taken 12/19/2023 1900 by Moshe Javed RN  Maintains optimal cardiac output and hemodynamic stability: Monitor blood pressure and heart rate     Problem: Skin/Tissue Integrity - Adult  Goal: Skin integrity remains intact  12/20/2023 2126 by Hakeem Bearden RN  Outcome: Progressing  Flowsheets (Taken 12/20/2023 1915)  Skin Integrity Remains Intact: Monitor for areas of redness and/or skin breakdown  12/20/2023 0809 by Moshe Javed RN  Outcome: Progressing  Flowsheets  Taken 12/20/2023 0740 by Chela Rooney RN  Skin Integrity Remains Intact: Monitor for areas of redness and/or skin breakdown  Taken 12/19/2023 1900 by Moshe Javed RN  Skin Integrity Remains Intact: Monitor for areas of redness and/or skin breakdown     Problem: Musculoskeletal - Adult  Goal: Return

## 2023-12-22 VITALS
BODY MASS INDEX: 25.36 KG/M2 | OXYGEN SATURATION: 92 % | SYSTOLIC BLOOD PRESSURE: 75 MMHG | TEMPERATURE: 99.9 F | RESPIRATION RATE: 29 BRPM | WEIGHT: 137.79 LBS | DIASTOLIC BLOOD PRESSURE: 47 MMHG | HEART RATE: 109 BPM | HEIGHT: 62 IN

## 2023-12-22 PROBLEM — R52 PAIN: Status: ACTIVE | Noted: 2023-12-22

## 2023-12-22 PROBLEM — R06.02 SOB (SHORTNESS OF BREATH): Status: ACTIVE | Noted: 2023-12-22

## 2023-12-22 PROCEDURE — 6360000002 HC RX W HCPCS: Performed by: INTERNAL MEDICINE

## 2023-12-22 PROCEDURE — 6370000000 HC RX 637 (ALT 250 FOR IP): Performed by: INTERNAL MEDICINE

## 2023-12-22 PROCEDURE — 6370000000 HC RX 637 (ALT 250 FOR IP): Performed by: STUDENT IN AN ORGANIZED HEALTH CARE EDUCATION/TRAINING PROGRAM

## 2023-12-22 PROCEDURE — 99233 SBSQ HOSP IP/OBS HIGH 50: CPT | Performed by: INTERNAL MEDICINE

## 2023-12-22 PROCEDURE — 1100000000 HC RM PRIVATE

## 2023-12-22 PROCEDURE — 2700000000 HC OXYGEN THERAPY PER DAY

## 2023-12-22 PROCEDURE — 2500000003 HC RX 250 WO HCPCS: Performed by: INTERNAL MEDICINE

## 2023-12-22 PROCEDURE — 2580000003 HC RX 258: Performed by: INTERNAL MEDICINE

## 2023-12-22 RX ORDER — HYDROMORPHONE HYDROCHLORIDE 1 MG/ML
2 INJECTION, SOLUTION INTRAMUSCULAR; INTRAVENOUS; SUBCUTANEOUS
Status: DISCONTINUED | OUTPATIENT
Start: 2023-12-22 | End: 2023-12-23 | Stop reason: HOSPADM

## 2023-12-22 RX ORDER — HALOPERIDOL 2 MG/ML
2 SOLUTION ORAL
Status: DISCONTINUED | OUTPATIENT
Start: 2023-12-22 | End: 2023-12-23 | Stop reason: HOSPADM

## 2023-12-22 RX ORDER — HYDROMORPHONE HYDROCHLORIDE 1 MG/ML
0.25 INJECTION, SOLUTION INTRAMUSCULAR; INTRAVENOUS; SUBCUTANEOUS EVERY 4 HOURS PRN
Status: DISCONTINUED | OUTPATIENT
Start: 2023-12-22 | End: 2023-12-22

## 2023-12-22 RX ORDER — HYDROMORPHONE HYDROCHLORIDE 1 MG/ML
0.5 INJECTION, SOLUTION INTRAMUSCULAR; INTRAVENOUS; SUBCUTANEOUS
Status: DISCONTINUED | OUTPATIENT
Start: 2023-12-22 | End: 2023-12-22

## 2023-12-22 RX ORDER — HYDROMORPHONE HYDROCHLORIDE 1 MG/ML
1 INJECTION, SOLUTION INTRAMUSCULAR; INTRAVENOUS; SUBCUTANEOUS
Status: DISCONTINUED | OUTPATIENT
Start: 2023-12-22 | End: 2023-12-22

## 2023-12-22 RX ORDER — HYDROMORPHONE HYDROCHLORIDE 1 MG/ML
0.5 INJECTION, SOLUTION INTRAMUSCULAR; INTRAVENOUS; SUBCUTANEOUS
Status: COMPLETED | OUTPATIENT
Start: 2023-12-22 | End: 2023-12-22

## 2023-12-22 RX ORDER — HALOPERIDOL 2 MG/ML
2 SOLUTION ORAL EVERY 6 HOURS PRN
Status: DISCONTINUED | OUTPATIENT
Start: 2023-12-22 | End: 2023-12-22

## 2023-12-22 RX ORDER — GLYCOPYRROLATE 0.2 MG/ML
0.2 INJECTION INTRAMUSCULAR; INTRAVENOUS EVERY 6 HOURS PRN
Status: DISCONTINUED | OUTPATIENT
Start: 2023-12-22 | End: 2023-12-23 | Stop reason: HOSPADM

## 2023-12-22 RX ORDER — MIDAZOLAM HYDROCHLORIDE 1 MG/ML
1 INJECTION, SOLUTION INTRAMUSCULAR; INTRAVENOUS
Status: DISCONTINUED | OUTPATIENT
Start: 2023-12-22 | End: 2023-12-22

## 2023-12-22 RX ORDER — MIDAZOLAM HYDROCHLORIDE 1 MG/ML
2 INJECTION, SOLUTION INTRAMUSCULAR; INTRAVENOUS
Status: DISCONTINUED | OUTPATIENT
Start: 2023-12-22 | End: 2023-12-23 | Stop reason: HOSPADM

## 2023-12-22 RX ORDER — DIAZEPAM 5 MG/ML
5 INJECTION, SOLUTION INTRAMUSCULAR; INTRAVENOUS EVERY 30 MIN PRN
Status: DISCONTINUED | OUTPATIENT
Start: 2023-12-22 | End: 2023-12-23 | Stop reason: HOSPADM

## 2023-12-22 RX ORDER — DIAZEPAM 5 MG/ML
5 INJECTION, SOLUTION INTRAMUSCULAR; INTRAVENOUS
Status: DISCONTINUED | OUTPATIENT
Start: 2023-12-22 | End: 2023-12-22

## 2023-12-22 RX ORDER — HALOPERIDOL 2 MG/ML
2 SOLUTION ORAL
Status: DISCONTINUED | OUTPATIENT
Start: 2023-12-22 | End: 2023-12-22 | Stop reason: SDUPTHER

## 2023-12-22 RX ADMIN — ACETAMINOPHEN 650 MG: 650 SUPPOSITORY RECTAL at 13:42

## 2023-12-22 RX ADMIN — SODIUM CHLORIDE, PRESERVATIVE FREE 10 ML: 5 INJECTION INTRAVENOUS at 09:47

## 2023-12-22 RX ADMIN — RIVAROXABAN 20 MG: 20 TABLET, FILM COATED ORAL at 09:47

## 2023-12-22 RX ADMIN — MIDAZOLAM 1 MG: 1 INJECTION INTRAMUSCULAR; INTRAVENOUS at 16:48

## 2023-12-22 RX ADMIN — HYDROMORPHONE HYDROCHLORIDE 2 MG: 1 INJECTION, SOLUTION INTRAMUSCULAR; INTRAVENOUS; SUBCUTANEOUS at 18:20

## 2023-12-22 RX ADMIN — DIAZEPAM 5 MG: 5 INJECTION, SOLUTION INTRAMUSCULAR; INTRAVENOUS at 17:07

## 2023-12-22 RX ADMIN — HYDROMORPHONE HYDROCHLORIDE 1 MG: 1 INJECTION, SOLUTION INTRAMUSCULAR; INTRAVENOUS; SUBCUTANEOUS at 17:55

## 2023-12-22 RX ADMIN — MEGESTROL ACETATE 40 MG: 40 TABLET ORAL at 09:47

## 2023-12-22 RX ADMIN — MIDODRINE HYDROCHLORIDE 5 MG: 5 TABLET ORAL at 09:47

## 2023-12-22 RX ADMIN — HYDROMORPHONE HYDROCHLORIDE 2 MG: 1 INJECTION, SOLUTION INTRAMUSCULAR; INTRAVENOUS; SUBCUTANEOUS at 18:54

## 2023-12-22 RX ADMIN — HALOPERIDOL 2 MG: 2 SOLUTION ORAL at 12:14

## 2023-12-22 RX ADMIN — POTASSIUM BICARBONATE 40 MEQ: 782 TABLET, EFFERVESCENT ORAL at 09:47

## 2023-12-22 RX ADMIN — PAROXETINE HYDROCHLORIDE 10 MG: 20 TABLET, FILM COATED ORAL at 09:47

## 2023-12-22 RX ADMIN — HYDROMORPHONE HYDROCHLORIDE 0.25 MG: 1 INJECTION, SOLUTION INTRAMUSCULAR; INTRAVENOUS; SUBCUTANEOUS at 12:20

## 2023-12-22 RX ADMIN — HYDROMORPHONE HYDROCHLORIDE 1 MG: 1 INJECTION, SOLUTION INTRAMUSCULAR; INTRAVENOUS; SUBCUTANEOUS at 16:30

## 2023-12-22 RX ADMIN — Medication: at 09:48

## 2023-12-22 RX ADMIN — FUROSEMIDE 40 MG: 10 INJECTION, SOLUTION INTRAMUSCULAR; INTRAVENOUS at 09:47

## 2023-12-22 RX ADMIN — HYDROMORPHONE HYDROCHLORIDE 1 MG: 1 INJECTION, SOLUTION INTRAMUSCULAR; INTRAVENOUS; SUBCUTANEOUS at 17:06

## 2023-12-22 RX ADMIN — ONDANSETRON 4 MG: 2 INJECTION INTRAMUSCULAR; INTRAVENOUS at 10:09

## 2023-12-22 RX ADMIN — AMIODARONE HYDROCHLORIDE 200 MG: 200 TABLET ORAL at 09:47

## 2023-12-22 RX ADMIN — MIDODRINE HYDROCHLORIDE 5 MG: 5 TABLET ORAL at 12:20

## 2023-12-22 RX ADMIN — Medication: at 13:42

## 2023-12-22 RX ADMIN — DIAZEPAM 5 MG: 5 INJECTION, SOLUTION INTRAMUSCULAR; INTRAVENOUS at 18:20

## 2023-12-22 RX ADMIN — HYDROMORPHONE HYDROCHLORIDE 0.5 MG: 1 INJECTION, SOLUTION INTRAMUSCULAR; INTRAVENOUS; SUBCUTANEOUS at 16:14

## 2023-12-22 NOTE — PROGRESS NOTES
Palliative Medicine  Per H&P: Sandi Fernandez is a 68 y.o. female with a history of Afib, HTN, depression and dementia who was brought in by EMS for AMS and weakness. Patient lives at home alone but her daughter checks in on her daily, either in person or by camera / phone. Yesterday, the daughter checked the home cameras and noted that the patient went to bed early at around 6pm, something not very unusual.  This morning the patient was not on her recliner which was definitely unusual so the daughter tried to call but the patient was not answering. Daughter then came over and found the patient half in bed with her legs over the edge like she could not get up and slumped backwards. EMS was dispatched and patient found with sepsi, UTI, SAMUEL and low BP. HM was asked to admit. Palliative consult is for Goals of Care. Code Status: DNR     [facility]/19/2023     2:36 PM   Demographics   Marital Status Single   Carmen Glover is her only child so as legal nok is her primary HCDM. Patient / Family Encounter Documentation     Participants (names): Sandi Fernandez, (alert, confused, unable to participate), Carmen Glover, daughter, Dr. Beth Castillo, [facility]     Narrative: Palliative team circled back when assigned nurse, Harrison Horan reported patient was moaning and appeared to be in pain. Dr. Anne Ricks and this writer provided support to patient and daughter at bedside, encouraged daughter to hold her hand and talk to her. Remained with her as patient was medicated for pain and requested mouth care. Son in [facility] arrived to support daughter and after patient appeared to be a little more comfortable, this writer exited the space to allow them some privacy together. Encouraged them to continue to talk to patient and provide comfort touch. Psychosocial Issues Identified/ Resilience Factors:   Patient is  single, retired nurse from [employer]. Her only child is Carmen Glover.  Patient likes to sew and spend time with her 5 grandchildren. Prior to this hospitalization, patient lived independently, does have chronic lymphedema, required cane or walker when she has a flareup of lymphedema. Terrell Choudhary visited her three to 4 times a week, does grocery shopping, patient is basically home bound. Caregiver Marietta: moderate  Does the caregiver feel confident administering medication? Does the caregiver need any help connecting with community resources? Does the caregiver feel confident assisting with activities of daily living? Goals of Care / Plan:   Daughter requested comfort measures. (See Dr. Pal Jolley notes.)  Hospice is consulted for admission tomorrow if needed. Palliative team will continue to provide education and support as appropriate. Thank you for including Palliative Medicine in the care of Ms. Samara Ha.      Breanna Bryan, [facility]  [phone] ([phone])

## 2023-12-22 NOTE — PROGRESS NOTES
1900 Bedside and Verbal shift change report given to Tia Dale RN (oncoming nurse) by Carlos Andrews RN (offgoing nurse). Report included the following information Nurse Handoff Report, MAR, Recent Results, and Cardiac Rhythm NSR . End of Shift Note    Bedside shift change report given to Johanna Babak (oncoming nurse) by Rachele Deshpande RN (offgoing nurse). Report included the following information SBAR, MAR, Recent Results, and Cardiac Rhythm NSR    Shift worked:  7pm-7am     Shift summary and any significant changes:     Kept comfortable and needs attended     Concerns for physician to address:        Zone phone for oncoming shift:           Activity:     Number times ambulated in hallways past shift: 0  Number of times OOB to chair past shift: 0    Cardiac:   Cardiac Monitoring: Yes           Access:  Current line(s): PIV     Genitourinary:   Urinary status: voiding    Respiratory:      Chronic home O2 use?: NO  Incentive spirometer at bedside: YES       GI:     Current diet:  ADULT DIET; Dysphagia - Soft and Bite Sized  ADULT ORAL NUTRITION SUPPLEMENT; Breakfast, Lunch, Dinner; Standard High Calorie/High Protein Oral Supplement  Passing flatus: YES  Tolerating current diet: YES       Pain Management:   Patient states pain is manageable on current regimen: YES    Skin:     Interventions: specialty bed, float heels, increase time out of bed, foam dressing, PT/OT consult, limit briefs, internal/external urinary devices, and nutritional support    Patient Safety:  Fall Score:    Interventions: bed/chair alarm, assistive device (walker, cane. etc), gripper socks, pt to call before getting OOB, stay with me (per policy), and gait belt       Length of Stay:  Expected LOS: 21  Actual LOS: 17    James Score 11.  All of the following interventions have been implemented to prevent pressure injury:    SKIN ASSESSMENT (S)  Dual skin assessment completed at shift change: Yes  Name of second RN who completed Dual Skin Assessment: Kandace

## 2023-12-22 NOTE — PROGRESS NOTES
Physician Progress Note      PATIENT:               Katty Putnam  CSN #:                  [id number]  :                       1950  ADMIT DATE:       2023 12:04 PM  [address] DATE:  RESPONDING  PROVIDER #:        Haven Cavazos MD          QUERY TEXT:    Pt admitted with Sepsis/UTI and has CHF documented. If possible, please   document in progress notes and discharge summary further specificity regarding   the type and acuity of CHF:      The medical record reflects the following:  Risk Factors: Afib, HTN, Dementia, Depression  Clinical Indicators: Fluid overload with interstitial pulmonary edema on Chest    X-RAY 12/10; echo from 23 notes EF 55-60%, Indeterminate diastolic   function  Treatment: Lasix 40 IV again today. Negative 900 yesterday. Try to have her   sitting up more to clear secretions. Lungs are congested. Options provided:  -- Acute on Chronic Systolic CHF/HFrEF  -- Acute on Chronic Diastolic CHF/HFpEF  -- Acute on Chronic Systolic and Diastolic CHF  -- Other - I will add my own diagnosis  -- Disagree - Not applicable / Not valid  -- Disagree - Clinically unable to determine / Unknown  -- Refer to Clinical Documentation Reviewer    PROVIDER RESPONSE TEXT:    This patient is in acute on chronic diastolic CHF/HFpEF. Query created by:  Miguel Porter on 2023 1:56 PM      Electronically signed by:  Haven Cavazos MD 2023 12:03 PM

## 2023-12-22 NOTE — PROGRESS NOTES
Hospice Liaison Note: f/u conversation with dtr, Valerie Su today. Pt lives at home with her and she and her  are her primary cg's. Valerie Su is open to hospice support at home and had a lot of questions: would mom still qualify for hospice if she improves with this antibiotic run, what support would look like in the home. We discussed that her mother's parkinson's and dementia has certainly progressed and will continue to as she has seen a decrease in appetite and functional decline. Therefore, she is appropriate for hospice and the role of hospice is to support pt and family in the home and allow a natural progression towards end of life and management of symptoms related to the disease process. Valerie Su reports that they do have DME in the home already to include: hospital bed (pt's PCP arranged this - unsure if they own it or it is a rental through insurance), walker and bedside commode. Valerie Su would like for her mother to complete this IV antibiotic run in the hospital and likely bring her home after [place] with hospice support. We will plan to follow up with Angela and make plans for home hospice once IV antibiotic run is complete and she is ready for discharge. Thank you for the opportunity to be of service to this patient and family.

## 2023-12-22 NOTE — PLAN OF CARE
Problem: Discharge Planning  Goal: Discharge to home or other facility with appropriate resources  12/22/2023 1135 by Rudy Petty RN  Outcome: Progressing  12/22/2023 0407 by Dillon Mars RN  Outcome: Progressing     Problem: Pain  Goal: Verbalizes/displays adequate comfort level or baseline comfort level  12/22/2023 1135 by Rudy Petty RN  Outcome: Progressing  12/22/2023 0407 by Dillon Mars RN  Outcome: Progressing     Problem: Safety - Adult  Goal: Free from fall injury  12/22/2023 1135 by Rudy Petty RN  Outcome: Progressing  12/22/2023 0407 by Dillon Mars RN  Outcome: Progressing     Problem: Skin/Tissue Integrity  Goal: Absence of new skin breakdown  Description: 1. Monitor for areas of redness and/or skin breakdown  2. Assess vascular access sites hourly  3. Every 4-6 hours minimum:  Change oxygen saturation probe site  4. Every 4-6 hours:  If on nasal continuous positive airway pressure, respiratory therapy assess nares and determine need for appliance change or resting period.   12/22/2023 1135 by Rudy Petty RN  Outcome: Progressing  12/22/2023 0407 by Dillon Mars RN  Outcome: Progressing     Problem: Metabolic/Fluid and Electrolytes - Adult  Goal: Electrolytes maintained within normal limits  12/22/2023 1135 by Rudy Petty RN  Outcome: Progressing  12/22/2023 0407 by Dillon Mars RN  Outcome: Progressing  Goal: Hemodynamic stability and optimal renal function maintained  12/22/2023 1135 by Rudy Petty RN  Outcome: Progressing  12/22/2023 0407 by Dillon Mars RN  Outcome: Progressing     Problem: Hematologic - Adult  Goal: Maintains hematologic stability  12/22/2023 1135 by Rudy Petty RN  Outcome: Progressing  12/22/2023 0407 by Dillon Mars RN  Outcome: Progressing     Problem: Neurosensory - Adult  Goal: Achieves stable or improved neurological status  12/22/2023 1135 by Rudy Petty RN  Outcome: Progressing  12/22/2023 0407 by Dillon Mars RN  Outcome: Progressing  Goal: Achieves maximal functionality and self care  12/22/2023 1135 by Tamara Flores RN  Outcome: Progressing  12/22/2023 0407 by Jesse Delgado RN  Outcome: Progressing     Problem: Respiratory - Adult  Goal: Achieves optimal ventilation and oxygenation  12/22/2023 1135 by Tamara Flores RN  Outcome: Progressing  12/22/2023 0407 by Jesse Delgado RN  Outcome: Progressing     Problem: Cardiovascular - Adult  Goal: Maintains optimal cardiac output and hemodynamic stability  12/22/2023 1135 by Tamara Flores RN  Outcome: Progressing  12/22/2023 0407 by Jesse Delgado RN  Outcome: Progressing  Flowsheets (Taken 12/21/2023 1930)  Maintains optimal cardiac output and hemodynamic stability: Monitor blood pressure and heart rate     Problem: Skin/Tissue Integrity - Adult  Goal: Skin integrity remains intact  12/22/2023 1135 by Tamara Flores RN  Outcome: Progressing  12/22/2023 0407 by Jsese Delgado RN  Outcome: Progressing  Flowsheets (Taken 12/21/2023 1930)  Skin Integrity Remains Intact: Monitor for areas of redness and/or skin breakdown     Problem: Musculoskeletal - Adult  Goal: Return mobility to safest level of function  12/22/2023 1135 by Tamara Flores RN  Outcome: Progressing  12/22/2023 0407 by Jesse Delgado RN  Outcome: Progressing  Goal: Return ADL status to a safe level of function  12/22/2023 1135 by Tamara Flores RN  Outcome: Progressing  12/22/2023 0407 by Jesse Delgado RN  Outcome: Progressing     Problem: Gastrointestinal - Adult  Goal: Maintains or returns to baseline bowel function  12/22/2023 1135 by Tamara Flores RN  Outcome: Progressing  12/22/2023 0407 by Jesse Delgado RN  Outcome: Progressing  Goal: Maintains adequate nutritional intake  12/22/2023 1135 by Tamara Flores RN  Outcome: Progressing  12/22/2023 0407 by Jesse Delgado RN  Outcome: Progressing     Problem: Genitourinary - Adult  Goal: Urinary catheter remains patent  12/22/2023 1135 by Tamara Flores RN  Outcome:

## 2023-12-22 NOTE — PLAN OF CARE
Problem: Discharge Planning  Goal: Discharge to home or other facility with appropriate resources  Outcome: Progressing     Problem: Pain  Goal: Verbalizes/displays adequate comfort level or baseline comfort level  Outcome: Progressing     Problem: Safety - Adult  Goal: Free from fall injury  Outcome: Progressing     Problem: Skin/Tissue Integrity  Goal: Absence of new skin breakdown  Description: 1. Monitor for areas of redness and/or skin breakdown  2. Assess vascular access sites hourly  3. Every 4-6 hours minimum:  Change oxygen saturation probe site  4. Every 4-6 hours:  If on nasal continuous positive airway pressure, respiratory therapy assess nares and determine need for appliance change or resting period.   Outcome: Progressing     Problem: Metabolic/Fluid and Electrolytes - Adult  Goal: Electrolytes maintained within normal limits  Outcome: Progressing  Goal: Hemodynamic stability and optimal renal function maintained  Outcome: Progressing     Problem: Hematologic - Adult  Goal: Maintains hematologic stability  Outcome: Progressing     Problem: Neurosensory - Adult  Goal: Achieves stable or improved neurological status  Outcome: Progressing  Goal: Achieves maximal functionality and self care  Outcome: Progressing     Problem: Respiratory - Adult  Goal: Achieves optimal ventilation and oxygenation  Outcome: Progressing     Problem: Cardiovascular - Adult  Goal: Maintains optimal cardiac output and hemodynamic stability  Outcome: Progressing  Flowsheets (Taken 12/21/2023 1930)  Maintains optimal cardiac output and hemodynamic stability: Monitor blood pressure and heart rate     Problem: Skin/Tissue Integrity - Adult  Goal: Skin integrity remains intact  Outcome: Progressing  Flowsheets (Taken 12/21/2023 1930)  Skin Integrity Remains Intact: Monitor for areas of redness and/or skin breakdown     Problem: Musculoskeletal - Adult  Goal: Return mobility to safest level of function  Outcome:

## 2023-12-23 PROCEDURE — 2700000000 HC OXYGEN THERAPY PER DAY

## 2023-12-23 NOTE — PROGRESS NOTES
Responded to page from [address] of pt's passing. Family present but appeared to be on their way out. Attempted to meet with family but upon arrival found they had left. Prayer of commendation at bedside following chart review.

## 2023-12-23 NOTE — PROGRESS NOTES
Physician Pronouncement of Death    Called by nursing to pronounce patient. Death expected: YES  Family informed: YES    Physical Exam:  No corneal reflex. No gag reflex. No heart sounds on auscultation. No spontaneous breath sounds. No withdrawal from painful stimuli. Time of Death: 1954    Signed: DYLON Lo CNP  12/22/2023 8:05 PM    Death summary and discharge to be completed by primary attending.

## 2023-12-23 NOTE — PROGRESS NOTES
Patient in room without pulse and no chest rise and fall during end of shift rounds. Dr. Joseph Way notified via phone. Dennys Roberts NP notified via perfect serve to pronounce patient. 1948- Attempt made to reach [employer]. Unable to speak with someone, message left on voicemail for return call. [facility] paged      .

## 2023-12-23 NOTE — PROGRESS NOTES
Bedside and Verbal shift change report given to Ana SZYMANSKI (oncoming nurse) by John Roach (offgoing nurse). Report included the following information Nurse Handoff Report. At shift change while getting report on different patient, patient family member asked day shift nurse, Elizabeth Boateng RN, to patient bedside due to belief that patient has stopped breathing. 1948: Patient pulseless, has no breath sounds or chest rise. Verified by both Ana night shift RN and Elizabeth Boateng day shift RN. Patient family members at bedside. Palliative MD notified via phone and voicemail left for [facility] by John Roach. 1959: RICHY Carolynclover Tobias at patient bedside. Death pronounced with signature time of 2005. Patient family members left facility. Phone numbers passed to this RN by day shift RN: [employer] and [facility] [phone], and Stephani Rodriguez (patient daughter) [phone]. [facility] at patient bedside. 2110: This RN called again and spoke with Jairon. [facility] and tissue releasing patient per [facility]. [facility] employee forwarding information to [facility] who will call us if applicable per [facility]. Post mortem care completed and IV removed. Identification tags applied. Patient paperwork walked to nursing supervisor office and patient transferred to [facility] with Ana SZYMANSKI and [facility].

## 2023-12-26 ENCOUNTER — TELEPHONE (OUTPATIENT)
Age: 73
End: 2023-12-26

## 2023-12-26 NOTE — TELEPHONE ENCOUNTER
Shannon called in from [facility]  home stating patient passed away on  and they are requesting  to sign death certificate. Please log in to [facility] and sign electronically

## (undated) DEVICE — CONTAINER SPEC 20 ML LID NEUT BUFF FORMALIN 10 % POLYPR STS

## (undated) DEVICE — SNARE ENDOSCP M L240CM W27MM SHTH DIA2.4MM CHN 2.8MM OVL

## (undated) DEVICE — BASIN EMSIS 16OZ GRAPHITE PLAS KID SHP MOLD GRAD FOR ORAL

## (undated) DEVICE — SYRINGE MED 3ML CLR PLAS STD N CTRL LUERLOCK TIP DISP

## (undated) DEVICE — SYRINGE MED 10ML LUERLOCK TIP W/O SFTY DISP

## (undated) DEVICE — STRAINER URIN CALC RNL MSH -- CONVERT TO ITEM 357634

## (undated) DEVICE — NON-REM POLYHESIVE PATIENT RETURN ELECTRODE: Brand: VALLEYLAB

## (undated) DEVICE — TOWEL 4 PLY TISS 19X30 SUE WHT

## (undated) DEVICE — 1200 GUARD II KIT W/5MM TUBE W/O VAC TUBE: Brand: GUARDIAN

## (undated) DEVICE — ELECTRODE,RADIOTRANSLUCENT,FOAM,5PK: Brand: MEDLINE

## (undated) DEVICE — NEONATAL-ADULT SPO2 SENSOR: Brand: NELLCOR

## (undated) DEVICE — CATH IV AUTOGRD BC PNK 20GA 25 -- INSYTE

## (undated) DEVICE — HYPODERMIC SAFETY NEEDLE: Brand: MAGELLAN

## (undated) DEVICE — Z DISCONTINUED PER MEDLINE LINE GAS SAMPLING O2/CO2 LNG AD 13 FT NSL W/ TBNG FILTERLINE

## (undated) DEVICE — SET ADMIN 16ML TBNG L100IN 2 Y INJ SITE IV PIGGY BK DISP (ORDER IN MULIPLES OF 48)

## (undated) DEVICE — Device

## (undated) DEVICE — TRAP,MUCUS SPECIMEN, 80CC: Brand: MEDLINE